# Patient Record
Sex: MALE | Race: BLACK OR AFRICAN AMERICAN | NOT HISPANIC OR LATINO | Employment: FULL TIME | URBAN - METROPOLITAN AREA
[De-identification: names, ages, dates, MRNs, and addresses within clinical notes are randomized per-mention and may not be internally consistent; named-entity substitution may affect disease eponyms.]

---

## 2017-05-03 ENCOUNTER — TELEPHONE (OUTPATIENT)
Dept: INTERNAL MEDICINE | Facility: CLINIC | Age: 62
End: 2017-05-03

## 2017-05-03 NOTE — TELEPHONE ENCOUNTER
----- Message from Daksha Perry sent at 4/28/2017  4:31 PM CDT -----  Contact: pt  Pt called and states he needs a physical done for work in the month of may and he can not wait until 6/19/17 that was the doctor next avail. Pt would like for the nurse to call him in regards to scheduling his physical for the month on may. Pt can be reached at [phone].

## 2017-05-19 ENCOUNTER — OFFICE VISIT (OUTPATIENT)
Dept: INTERNAL MEDICINE | Facility: CLINIC | Age: 62
End: 2017-05-19
Payer: COMMERCIAL

## 2017-05-19 VITALS
SYSTOLIC BLOOD PRESSURE: 136 MMHG | DIASTOLIC BLOOD PRESSURE: 82 MMHG | HEIGHT: 73 IN | WEIGHT: 207.31 LBS | HEART RATE: 66 BPM | BODY MASS INDEX: 27.47 KG/M2

## 2017-05-19 DIAGNOSIS — N52.9 ERECTILE DYSFUNCTION, UNSPECIFIED ERECTILE DYSFUNCTION TYPE: ICD-10-CM

## 2017-05-19 DIAGNOSIS — Z00.00 HEALTH CARE MAINTENANCE: Primary | ICD-10-CM

## 2017-05-19 DIAGNOSIS — D56.9 THALASSEMIA, UNSPECIFIED: ICD-10-CM

## 2017-05-19 PROCEDURE — 99999 PR PBB SHADOW E&M-EST. PATIENT-LVL III: CPT | Mod: PBBFAC,,, | Performed by: INTERNAL MEDICINE

## 2017-05-19 PROCEDURE — 99396 PREV VISIT EST AGE 40-64: CPT | Mod: S$GLB,,, | Performed by: INTERNAL MEDICINE

## 2017-05-19 RX ORDER — TADALAFIL 20 MG/1
20 TABLET ORAL EVERY OTHER DAY
Qty: 6 TABLET | Refills: 11 | Status: SHIPPED | OUTPATIENT
Start: 2017-05-19 | End: 2018-05-28 | Stop reason: SDUPTHER

## 2017-05-19 RX ORDER — AMOXICILLIN 500 MG/1
CAPSULE ORAL
Refills: 0 | COMMUNITY
Start: 2017-05-03 | End: 2017-05-19 | Stop reason: ALTCHOICE

## 2017-05-19 NOTE — PROGRESS NOTES
"Subjective:       Patient ID: Francis Weinberg is a 61 y.o. male.    Chief Complaint: Annual Exam    HPI   60 yo M here for yearly preventative healthcare visit.     Borderline BP    ED - cialis prn    Anemia 2/2 thalassemia      Review of Systems   Constitutional: Negative for fever.   Respiratory: Negative for shortness of breath.    Cardiovascular: Negative for chest pain.   Musculoskeletal: Negative.    Skin: Negative.        Objective:   /82 (BP Location: Left arm, Patient Position: Sitting, BP Method: Manual)  Pulse 66  Ht 6' 1" (1.854 m)  Wt 94 kg (207 lb 4.8 oz)  BMI 27.35 kg/m2     Physical Exam   Constitutional: He is oriented to person, place, and time. He appears well-developed and well-nourished.   HENT:   Head: Normocephalic and atraumatic.   Eyes: Conjunctivae and EOM are normal. Pupils are equal, round, and reactive to light.   Neck: Neck supple. No thyromegaly present.   Cardiovascular: Normal rate, regular rhythm and normal heart sounds.    No murmur heard.  Pulmonary/Chest: Effort normal and breath sounds normal. No respiratory distress. He has no wheezes.   Abdominal: Soft. Bowel sounds are normal. He exhibits no distension. There is no tenderness.   Musculoskeletal: Normal range of motion.   Neurological: He is alert and oriented to person, place, and time.   Skin: Skin is warm and dry. No rash noted.   Psychiatric: He has a normal mood and affect. Judgment and thought content normal.   Vitals reviewed.      Assessment:       1. Health care maintenance    2. Thalassemia, unspecified    3. Erectile dysfunction, unspecified erectile dysfunction type        Plan:       Francis was seen today for annual exam.    Diagnoses and all orders for this visit:    Health care maintenance  -     CBC auto differential; Future  -     Comprehensive metabolic panel; Future  -     Lipid panel; Future  -     TSH; Future    Thalassemia, unspecified  -     CBC auto differential; Future    Erectile dysfunction, " unspecified erectile dysfunction type  -     tadalafil (CIALIS) 20 MG Tab; Take 1 tablet (20 mg total) by mouth every other day. Take every other day as needed        cscope done 2012 in [place], repeat is due in 2022.

## 2017-05-19 NOTE — MR AVS SNAPSHOT
[facility]  [address]  [place] [address]  Phone: [phone]  Fax: [phone]                  Francis Weinberg   2017 1:40 PM   Office Visit    Description:  Male : 1955   Provider:  Latoya Olson MD   Department:  [facility]           Reason for Visit     Annual Exam           Diagnoses this Visit        Comments    Health care maintenance    -  Primary     Thalassemia, unspecified         Erectile dysfunction, unspecified erectile dysfunction type                To Do List           Future Appointments        Provider Department Dept Phone    2017 2:35 PM LAB, APPOINTMENT [facility] [phone]      Goals (5 Years of Data)     None      Follow-Up and Disposition     Return in about 1 year (around 2018) for 40 minute established visit.       These Medications        Disp Refills Start End    tadalafil (CIALIS) 20 MG Tab 6 tablet 11 2017    Take 1 tablet (20 mg total) by mouth every other day. Take every other day as needed - Oral    Pharmacy: [facility] - [place], [place] - [address] Ph #: [phone]         [facility]     [facility] Nurse Care Line -  Assistance  Unless otherwise directed by your provider, please contact [facility], our nurse care line that is available for  assistance.     Registered nurses in the [facility] provide: appointment scheduling, clinical advisement, health education, and other advisory services.  Call: [phone] (toll free)               Medications           Message regarding Medications     Verify the changes and/or additions to your medication regime listed below are the same as discussed with your clinician today.  If any of these changes or additions are incorrect, please notify your healthcare provider.        STOP taking these medications     amoxicillin (AMOXIL) 500 MG  "capsule TK ONE C PO TID TAT           Verify that the below list of medications is an accurate representation of the medications you are currently taking.  If none reported, the list may be blank. If incorrect, please contact your healthcare provider. Carry this list with you in case of emergency.           Current Medications     tadalafil (CIALIS) 20 MG Tab Take 1 tablet (20 mg total) by mouth every other day. Take every other day as needed           Clinical Reference Information           Your Vitals Were     BP Pulse Height Weight BMI    136/82 (BP Location: Left arm, Patient Position: Sitting, BP Method: Manual) 66 6' 1" (1.854 m) 94 kg (207 lb 4.8 oz) 27.35 kg/m2      Blood Pressure          Most Recent Value    BP  136/82      Allergies as of 5/19/2017     No Known Allergies      Immunizations Administered on Date of Encounter - 5/19/2017     None      Orders Placed During Today's Visit     Future Labs/Procedures Expected by Expires    CBC auto differential  5/19/2017 7/18/2018    Comprehensive metabolic panel  5/19/2017 7/18/2018    Lipid panel  5/19/2017 7/18/2018    TSH  5/19/2017 7/18/2018      [facility] Sign-Up     Activating your [facility] account is as easy as 1-2-3!     1) Visit [email or web address], select Sign Up Now, enter this activation code and your date of birth, then select Next.  [email or web address]  Expires: 7/3/2017  2:26 PM      2) Create a username and password to use when you visit [facility] in the future and select a security question in case you lose your password and select Next.    3) Enter your e-mail address and click Sign Up!    Additional Information  If you have questions, please e-mail [email or web address] or call [phone] to talk to our [facility] staff. Remember, [facility] is NOT to be used for urgent needs. For medical emergencies, dial 911.         Language Assistance Services     ATTENTION: Language assistance services are available, free of charge. Please call " [phone].      ATENCIÓN: Si habla español, tiene a hoffman disposición servicios gratuitos de asistencia lingüística. Llame al [phone].     CHÚ Ý: N?u b?n nói Ti?ng Vi?t, có các d?ch v? h? tr? ngôn ng? mi?n phí dành cho b?n. G?i s? [phone].         [facility] complies with applicable Federal civil rights laws and does not discriminate on the basis of race, color, national origin, age, disability, or sex.

## 2017-05-25 ENCOUNTER — TELEPHONE (OUTPATIENT)
Dept: INTERNAL MEDICINE | Facility: CLINIC | Age: 62
End: 2017-05-25

## 2017-05-25 NOTE — TELEPHONE ENCOUNTER
.Please call pt. Your liver and kidney function, thyroid function, and cholesterol were normal. Your anemia is stable from previous labs. Hemoglobin is stable at 13.

## 2017-10-17 ENCOUNTER — OFFICE VISIT (OUTPATIENT)
Dept: OPHTHALMOLOGY | Facility: CLINIC | Age: 62
End: 2017-10-17
Payer: COMMERCIAL

## 2017-10-17 DIAGNOSIS — H25.12 CATARACTA BRUNESCENS OF LEFT EYE: ICD-10-CM

## 2017-10-17 DIAGNOSIS — Z96.1 STATUS POST CATARACT EXTRACTION AND INSERTION OF INTRAOCULAR LENS, RIGHT: ICD-10-CM

## 2017-10-17 DIAGNOSIS — Z98.41 STATUS POST CATARACT EXTRACTION AND INSERTION OF INTRAOCULAR LENS, RIGHT: ICD-10-CM

## 2017-10-17 DIAGNOSIS — H25.812 COMBINED FORMS OF AGE-RELATED CATARACT OF LEFT EYE: Primary | ICD-10-CM

## 2017-10-17 PROCEDURE — 99999 PR PBB SHADOW E&M-EST. PATIENT-LVL II: CPT | Mod: PBBFAC,,, | Performed by: OPHTHALMOLOGY

## 2017-10-17 PROCEDURE — 76519 ECHO EXAM OF EYE: CPT | Mod: S$GLB,,, | Performed by: OPHTHALMOLOGY

## 2017-10-17 PROCEDURE — 92014 COMPRE OPH EXAM EST PT 1/>: CPT | Mod: S$GLB,,, | Performed by: OPHTHALMOLOGY

## 2017-10-17 NOTE — PROGRESS NOTES
HPI     Dr. Gerard referral     S/p phaco w/IOL OD 2/27/14     Pt reports of decrease OS due to cataract, lots of glare around lights at   night, makes him uncomfortable driving, would like to know if left   cataract is ready for removal. Pt was last seen by Dr.Sandra Holman on   [address] in [place]. Pt denies any flashes or floaters, not on any   gtts.  No Dm.  Has HTN, but well controlled    Last edited by Maicol Parra MD on 10/17/2017  2:55 PM. (History)        Assessment /Plan     For exam results, see Encounter Report.    Combined forms of age-related cataract of left eye    Cataracta brunescens of left eye  -     Axial Eye Length - OU - Both Eyes    Status post cataract extraction and insertion of intraocular lens, right      # Visually significant cataract - Brunescent/cortical/PSC cat OS  - Interfering with activities of daily living.  Pt desires cataract surgery for [facility] rehabilitation. - R/B/A discussed and pt agrees to proceed with surgery.   - IOL options discussed according to patient's goals and concomitant ocular pathology;  and pt content with monofocal lens.    - Target: plano. Previously: (SN60WF  21.0 OS)  - no phacodonesis    Visually significant nuclear sclerotic cataract   - Interfering with activities of daily living.  Pt desires cataract surgery for [facility] rehabilitation.   - R/B/A discussed and pt agrees to proceed with surgery.   - IOL options discussed according to patient's goals and concomitant ocular pathology; and pt content with monofocal lens.    - Target: plano.     (SN60WF  21.0 OS)  *VB    # Pseudophakia OD  - Phaco/PCIOL 2/27/14 with Dr. Womack OD  - Trace PCO, not visually significant, monitor  - SN60WF - 20.0     Will plan for cataract extraction with PCIOL OS

## 2017-10-18 ENCOUNTER — TELEPHONE (OUTPATIENT)
Dept: OPHTHALMOLOGY | Facility: CLINIC | Age: 62
End: 2017-10-18

## 2017-10-18 DIAGNOSIS — H25.12 NUCLEAR SCLEROTIC CATARACT OF LEFT EYE: Primary | ICD-10-CM

## 2017-10-18 RX ORDER — PREDNISOLONE ACETATE 10 MG/ML
1 SUSPENSION/ DROPS OPHTHALMIC 3 TIMES DAILY
Qty: 5 ML | Refills: 1 | Status: SHIPPED | OUTPATIENT
Start: 2017-10-18 | End: 2017-11-17

## 2017-10-18 RX ORDER — OFLOXACIN 3 MG/ML
1 SOLUTION/ DROPS OPHTHALMIC 3 TIMES DAILY
Qty: 5 ML | Refills: 1 | Status: SHIPPED | OUTPATIENT
Start: 2017-10-18 | End: 2017-11-17

## 2018-01-03 ENCOUNTER — TELEPHONE (OUTPATIENT)
Dept: OPHTHALMOLOGY | Facility: CLINIC | Age: 63
End: 2018-01-03

## 2018-01-04 ENCOUNTER — HOSPITAL ENCOUNTER (OUTPATIENT)
Facility: HOSPITAL | Age: 63
Discharge: HOME | End: 2018-01-04
Attending: OPHTHALMOLOGY | Admitting: OPHTHALMOLOGY
Payer: COMMERCIAL

## 2018-01-04 ENCOUNTER — ANESTHESIA EVENT (OUTPATIENT)
Dept: SURGERY | Facility: HOSPITAL | Age: 63
End: 2018-01-04
Payer: COMMERCIAL

## 2018-01-04 ENCOUNTER — SURGERY (OUTPATIENT)
Age: 63
End: 2018-01-04

## 2018-01-04 ENCOUNTER — ANESTHESIA (OUTPATIENT)
Dept: SURGERY | Facility: HOSPITAL | Age: 63
End: 2018-01-04
Payer: COMMERCIAL

## 2018-01-04 VITALS
WEIGHT: 205 LBS | SYSTOLIC BLOOD PRESSURE: 129 MMHG | HEIGHT: 73 IN | TEMPERATURE: 97 F | DIASTOLIC BLOOD PRESSURE: 86 MMHG | BODY MASS INDEX: 27.17 KG/M2 | HEART RATE: 72 BPM | OXYGEN SATURATION: 100 % | RESPIRATION RATE: 20 BRPM

## 2018-01-04 DIAGNOSIS — H25.10 SENILE NUCLEAR SCLEROSIS: ICD-10-CM

## 2018-01-04 DIAGNOSIS — H26.9 CORTICAL CATARACT OF LEFT EYE: Primary | ICD-10-CM

## 2018-01-04 PROCEDURE — 37000009 HC ANESTHESIA EA ADD 15 MINS: Performed by: OPHTHALMOLOGY

## 2018-01-04 PROCEDURE — D9220A PRA ANESTHESIA: Mod: ANES,,, | Performed by: ANESTHESIOLOGY

## 2018-01-04 PROCEDURE — 71000015 HC POSTOP RECOV 1ST HR: Performed by: OPHTHALMOLOGY

## 2018-01-04 PROCEDURE — D9220A PRA ANESTHESIA: Mod: CRNA,,, | Performed by: NURSE ANESTHETIST, CERTIFIED REGISTERED

## 2018-01-04 PROCEDURE — 63600175 PHARM REV CODE 636 W HCPCS: Performed by: NURSE ANESTHETIST, CERTIFIED REGISTERED

## 2018-01-04 PROCEDURE — 37000008 HC ANESTHESIA 1ST 15 MINUTES: Performed by: OPHTHALMOLOGY

## 2018-01-04 PROCEDURE — 36000707: Performed by: OPHTHALMOLOGY

## 2018-01-04 PROCEDURE — 25000003 PHARM REV CODE 250: Performed by: OPHTHALMOLOGY

## 2018-01-04 PROCEDURE — 66982 XCAPSL CTRC RMVL CPLX WO ECP: CPT | Mod: LT,,, | Performed by: OPHTHALMOLOGY

## 2018-01-04 PROCEDURE — 36000706: Performed by: OPHTHALMOLOGY

## 2018-01-04 PROCEDURE — C9447 INJ, PHENYLEPHRINE KETOROLAC: HCPCS | Performed by: OPHTHALMOLOGY

## 2018-01-04 PROCEDURE — 63600175 PHARM REV CODE 636 W HCPCS: Performed by: OPHTHALMOLOGY

## 2018-01-04 PROCEDURE — 71000044 HC DOSC ROUTINE RECOVERY FIRST HOUR: Performed by: OPHTHALMOLOGY

## 2018-01-04 PROCEDURE — V2632 POST CHMBR INTRAOCULAR LENS: HCPCS | Performed by: OPHTHALMOLOGY

## 2018-01-04 DEVICE — LENS 21.0 ACRYSOF: Type: IMPLANTABLE DEVICE | Site: EYE | Status: FUNCTIONAL

## 2018-01-04 RX ORDER — MOXIFLOXACIN 5 MG/ML
SOLUTION/ DROPS OPHTHALMIC
Status: DISCONTINUED | OUTPATIENT
Start: 2018-01-04 | End: 2018-01-04 | Stop reason: HOSPADM

## 2018-01-04 RX ORDER — MIDAZOLAM HYDROCHLORIDE 1 MG/ML
INJECTION, SOLUTION INTRAMUSCULAR; INTRAVENOUS
Status: DISCONTINUED | OUTPATIENT
Start: 2018-01-04 | End: 2018-01-04

## 2018-01-04 RX ORDER — SODIUM CHLORIDE 9 MG/ML
INJECTION, SOLUTION INTRAVENOUS CONTINUOUS
Status: DISCONTINUED | OUTPATIENT
Start: 2018-01-04 | End: 2018-01-04 | Stop reason: HOSPADM

## 2018-01-04 RX ORDER — PHENYLEPHRINE HYDROCHLORIDE 25 MG/ML
1 SOLUTION/ DROPS OPHTHALMIC
Status: COMPLETED | OUTPATIENT
Start: 2018-01-04 | End: 2018-01-04

## 2018-01-04 RX ORDER — KETOROLAC TROMETHAMINE 5 MG/ML
1 SOLUTION OPHTHALMIC ONCE
Status: COMPLETED | OUTPATIENT
Start: 2018-01-04 | End: 2018-01-04

## 2018-01-04 RX ORDER — TROPICAMIDE 10 MG/ML
1 SOLUTION/ DROPS OPHTHALMIC
Status: COMPLETED | OUTPATIENT
Start: 2018-01-04 | End: 2018-01-04

## 2018-01-04 RX ORDER — LIDOCAINE HYDROCHLORIDE 40 MG/ML
INJECTION, SOLUTION RETROBULBAR
Status: DISCONTINUED | OUTPATIENT
Start: 2018-01-04 | End: 2018-01-04 | Stop reason: HOSPADM

## 2018-01-04 RX ORDER — PREDNISOLONE ACETATE 10 MG/ML
SUSPENSION/ DROPS OPHTHALMIC
Status: DISCONTINUED | OUTPATIENT
Start: 2018-01-04 | End: 2018-01-04 | Stop reason: HOSPADM

## 2018-01-04 RX ORDER — LIDOCAINE HYDROCHLORIDE 10 MG/ML
INJECTION, SOLUTION EPIDURAL; INFILTRATION; INTRACAUDAL; PERINEURAL
Status: DISCONTINUED
Start: 2018-01-04 | End: 2018-01-04 | Stop reason: HOSPADM

## 2018-01-04 RX ORDER — LIDOCAINE HYDROCHLORIDE 10 MG/ML
INJECTION, SOLUTION EPIDURAL; INFILTRATION; INTRACAUDAL; PERINEURAL
Status: DISCONTINUED | OUTPATIENT
Start: 2018-01-04 | End: 2018-01-04 | Stop reason: HOSPADM

## 2018-01-04 RX ORDER — PROPARACAINE HYDROCHLORIDE 5 MG/ML
1 SOLUTION/ DROPS OPHTHALMIC ONCE
Status: COMPLETED | OUTPATIENT
Start: 2018-01-04 | End: 2018-01-04

## 2018-01-04 RX ORDER — PREDNISOLONE ACETATE 10 MG/ML
SUSPENSION/ DROPS OPHTHALMIC
Status: DISCONTINUED
Start: 2018-01-04 | End: 2018-01-04 | Stop reason: HOSPADM

## 2018-01-04 RX ORDER — MOXIFLOXACIN 5 MG/ML
1 SOLUTION/ DROPS OPHTHALMIC
Status: COMPLETED | OUTPATIENT
Start: 2018-01-04 | End: 2018-01-04

## 2018-01-04 RX ORDER — LIDOCAINE HYDROCHLORIDE 40 MG/ML
INJECTION, SOLUTION RETROBULBAR
Status: DISCONTINUED
Start: 2018-01-04 | End: 2018-01-04 | Stop reason: HOSPADM

## 2018-01-04 RX ORDER — ACETAMINOPHEN 325 MG/1
650 TABLET ORAL EVERY 4 HOURS PRN
Status: DISCONTINUED | OUTPATIENT
Start: 2018-01-04 | End: 2018-01-04 | Stop reason: HOSPADM

## 2018-01-04 RX ADMIN — LIDOCAINE HYDROCHLORIDE 1 ML: 10 INJECTION, SOLUTION EPIDURAL; INFILTRATION; INTRACAUDAL; PERINEURAL at 09:01

## 2018-01-04 RX ADMIN — SODIUM CHLORIDE 1000 ML: 0.9 INJECTION, SOLUTION INTRAVENOUS at 09:01

## 2018-01-04 RX ADMIN — MOXIFLOXACIN HYDROCHLORIDE 1 DROP: 5 SOLUTION/ DROPS OPHTHALMIC at 09:01

## 2018-01-04 RX ADMIN — LIDOCAINE HYDROCHLORIDE 2 ML: 40 INJECTION, SOLUTION RETROBULBAR; TOPICAL at 09:01

## 2018-01-04 RX ADMIN — PHENYLEPHRINE HYDROCHLORIDE 1 DROP: 25 SOLUTION/ DROPS OPHTHALMIC at 08:01

## 2018-01-04 RX ADMIN — MOXIFLOXACIN HYDROCHLORIDE 1 DROP: 5 SOLUTION/ DROPS OPHTHALMIC at 08:01

## 2018-01-04 RX ADMIN — TROPICAMIDE 1 DROP: 10 SOLUTION/ DROPS OPHTHALMIC at 09:01

## 2018-01-04 RX ADMIN — TROPICAMIDE 1 DROP: 10 SOLUTION/ DROPS OPHTHALMIC at 08:01

## 2018-01-04 RX ADMIN — PHENYLEPHRINE AND KETOROLAC 4 ML: 10.16; 2.88 INJECTION, SOLUTION, CONCENTRATE INTRAOCULAR at 09:01

## 2018-01-04 RX ADMIN — PREDNISOLONE ACETATE 1 DROP: 10 SUSPENSION/ DROPS OPHTHALMIC at 09:01

## 2018-01-04 RX ADMIN — PROPARACAINE HYDROCHLORIDE 1 DROP: 5 SOLUTION/ DROPS OPHTHALMIC at 08:01

## 2018-01-04 RX ADMIN — MIDAZOLAM HYDROCHLORIDE 2 MG: 1 INJECTION, SOLUTION INTRAMUSCULAR; INTRAVENOUS at 09:01

## 2018-01-04 RX ADMIN — KETOROLAC TROMETHAMINE 1 DROP: 5 SOLUTION/ DROPS OPHTHALMIC at 08:01

## 2018-01-04 RX ADMIN — SODIUM CHONDROITIN SULFATE / SODIUM HYALURONATE 1.05 ML: 0.55-0.5 INJECTION INTRAOCULAR at 09:01

## 2018-01-04 RX ADMIN — PHENYLEPHRINE HYDROCHLORIDE 1 DROP: 25 SOLUTION/ DROPS OPHTHALMIC at 09:01

## 2018-01-04 NOTE — ANESTHESIA PREPROCEDURE EVALUATION
01/04/2018  Francis Weinberg is a 62 y.o., male.  Pre-operative evaluation for Procedure(s) (LRB):  PHACOEMULSIFICATION-ASPIRATION-CATARACT (Left)  INSERTION-INTRAOCULAR LENS (IOL) (Left)    Review of patient's allergies indicates:  No Known Allergies    No current facility-administered medications on file prior to encounter.      Current Outpatient Prescriptions on File Prior to Encounter   Medication Sig Dispense Refill    tadalafil (CIALIS) 20 MG Tab Take 1 tablet (20 mg total) by mouth every other day. Take every other day as needed 6 tablet 11       Patient Active Problem List   Diagnosis    Cortical cataract of left eye    Nuclear cataract    S/P right cataract extraction    Thalassemia, unspecified    Combined forms of age-related cataract of left eye    Pseudophakia, left eye    Senile nuclear sclerosis       Past Surgical History:   Procedure Laterality Date    CATARACT EXTRACTION W/  INTRAOCULAR LENS IMPLANT  2/27/14    Right Eye           No results for input(s): HCT in the last 72 hours.  No results for input(s): PLT in the last 72 hours.  No results for input(s): K in the last 72 hours.  No results for input(s): CREATININE in the last 72 hours.  No results for input(s): GLU in the last 72 hours.  No results for input(s): PT in the last 72 hours.                    Anesthesia Evaluation         Review of Systems  Anesthesia Hx:  No problems with previous Anesthesia   Social:  Non-Smoker, Alcohol Use    Hematology/Oncology:  Hematology Normal   Oncology Normal    -- Denies Anemia:    Cardiovascular:   Denies Hypertension.  Denies MI.    Denies Angina.     Frequent one our on treadmill, 3 miles.   Pulmonary:  Pulmonary Normal  Denies COPD.  Denies Asthma.  Denies Shortness of breath.    Renal/:   Denies Chronic Renal Disease.     Hepatic/GI:   Denies Liver Disease.    Neurological:   Denies  TIA. Denies CVA. Denies Seizures.    Endocrine:   Denies Diabetes.        Physical Exam  General:  Well nourished    Airway/Jaw/Neck:  Airway Findings: Mouth Opening: Normal Tongue: Normal  General Airway Assessment: Adult, Average  Mallampati: II  TM Distance: Normal, at least 6 cm  Jaw/Neck Findings:  Neck ROM: Normal ROM       Chest/Lungs:  Chest/Lungs Findings: Clear to auscultation     Heart/Vascular:  Heart Findings: Rate: Normal  Rhythm: Regular Rhythm  Sounds: Normal        Mental Status:  Mental Status Findings:  Cooperative, Alert and Oriented         Anesthesia Plan  Type of Anesthesia, risks & benefits discussed:  Anesthesia Type:  general, MAC  Patient's Preference:   Intra-op Monitoring Plan:   Intra-op Monitoring Plan Comments:   Post Op Pain Control Plan:   Post Op Pain Control Plan Comments: As per surgeon's plan  Induction:   IV  Beta Blocker:  Patient is not currently on a Beta-Blocker (No further documentation required).       Informed Consent: Patient understands risks and agrees with Anesthesia plan.  Questions answered. Anesthesia consent signed with patient.  ASA Score: 2     Day of Surgery Review of History & Physical:    H&P update referred to the surgeon.         Ready For Surgery From Anesthesia Perspective.     Vitals - 1 value per visit 5/19/2017 10/17/2017 1/4/2018   SYSTOLIC 136  146   DIASTOLIC 82  102   PULSE 66  71

## 2018-01-04 NOTE — OP NOTE
DATE OF PROCEDURE: 01/04/2018    SURGEON: LYDIA TRAN MD    PREOPERATIVE DIAGNOSIS:  Mature brunescent senile nuclear sclerotic cataract left eye.     POSTOPERATIVE DIAGNOSIS: Mature brunescent senile nuclear sclerotic cataract left eye.     PROCEDURE PERFORMED:  Complex phacoemulsification with placement of intraocular lens, left eye, with trypan blue    IMPLANT:  SN60WF 21.0    ANESTHESIA:  Topical and MAC    COMPLICATIONS: none    ESTIMATED BLOOD LOSS: <1cc    SPECIMENS: none    INDICATIONS FOR PROCEDURE:  This patient presented to the clinic with decreased vision in the left eye and was found to have a cataract.  The risks, benefits, and alternatives were discussed and the patient agreed to proceed with phacoemulsification and implantation of a lens in the left eye.     PROCEDURE IN DETAIL:  The patient was met in the preop holding area.  Consent was confirmed to be signed.  The operative site was marked.  The patient was brought into the operating room by the anesthesia team and placed under monitored anesthesia care.  The left eye was prepped and draped in a sterile ophthalmic fashion.  A Pedro Luis speculum was placed into the left eye.   A paracentesis site was made and 1% preservative-free lidocaine was injected into the anterior chamber.  Trypan blue was then injected and allowed to sit for 1 minute.  Then BSS was used to wash out the trypan blue. Viscoelastic material was injected into the anterior chamber.  A keratome blade was used to make a clear corneal incision.  A cystotome was used to initiate the continuous curvilinear capsulorrhexis which was completed with Utrata forceps.  BSS on a meyers cannula was used to perform hydrodissection.  The phacoemulsification tip was introduced into the eye and the nucleus was removed in a standard divide-and-conquer fashion.  Remaining cortical material was removed from the eye using irrigation-aspiration.  The capsular bag was filled with viscoelastic material  and the intraocular lens was injected and positioned into place. Remaining viscoelastic material was removed from the eye using irrigation and aspiration.  The corneal wounds were hydrated.  The eye was filled to physiologic pressure. The wounds were found to be watertight. Drops of Vigamox and prednisilone were placed into the eye.  The eye was washed, dried, and shielded.  The patient tolerated the procedure well and knows to follow up with me tomorrow morning, sooner if needed.

## 2018-01-04 NOTE — ANESTHESIA POSTPROCEDURE EVALUATION
"Anesthesia Post Evaluation    Patient: Francis Weinberg    Procedure(s) Performed: Procedure(s) (LRB):  PHACOEMULSIFICATION-ASPIRATION-CATARACT (Left)  INSERTION-INTRAOCULAR LENS (IOL) (Left)    Final Anesthesia Type: MAC  Patient location during evaluation: PACU  Patient participation: Yes- Able to Participate  Level of consciousness: awake and alert and oriented  Post-procedure vital signs: reviewed and stable  Pain management: adequate  Airway patency: patent  PONV status at discharge: No PONV  Anesthetic complications: no      Cardiovascular status: stable  Respiratory status: unassisted, spontaneous ventilation and room air  Hydration status: euvolemic  Follow-up not needed.        Visit Vitals  /86 (BP Location: Left arm, Patient Position: Lying)   Pulse 72   Temp 36.5 °C (97.7 °F) (Oral)   Resp 20   Ht 6' 1" (1.854 m)   Wt 93 kg (205 lb)   SpO2 100%   BMI 27.05 kg/m²       Pain/Alisha Score: Pain Assessment Performed: Yes (1/4/2018  9:38 AM)  Presence of Pain: denies (1/4/2018  9:38 AM)  Alisha Score: 10 (1/4/2018  9:38 AM)      "

## 2018-01-04 NOTE — DISCHARGE INSTRUCTIONS
Small-Incision Cataract Surgery: Removing the Old Lens    You may be surprised by how little time small-incision cataract surgery takes.  The procedure  Your doctor uses a microscope and tiny tools to make the incision and remove the old lens. A special tool breaks apart the old lens with sound waves (ultrasound) and then takes out the pieces. This process is called phacoemulsification. The natural membrane (capsule) that held your lens is left in place.  Incision size  A smaller incision means a shorter recovery time for you. The location of the incision will vary.   Date Last Reviewed: 6/14/2015  © 4374-4186 [employer]. [address] [place] [address], [place] [address]. All rights reserved. This information is not intended as a substitute for professional medical care. Always follow your healthcare professional's instructions.

## 2018-01-04 NOTE — TRANSFER OF CARE
"Anesthesia Transfer of Care Note    Patient: Francis Weinberg    Procedure(s) Performed: Procedure(s) (LRB):  PHACOEMULSIFICATION-ASPIRATION-CATARACT (Left)  INSERTION-INTRAOCULAR LENS (IOL) (Left)    Patient location: PACU    Anesthesia Type: MAC    Transport from OR: Transported from OR on room air with adequate spontaneous ventilation    Post pain: adequate analgesia    Post assessment: no apparent anesthetic complications    Post vital signs: stable    Level of consciousness: awake, alert and oriented    Nausea/Vomiting: no nausea/vomiting    Complications: none    Transfer of care protocol was followed      Last vitals:   Visit Vitals  BP (!) 146/102 (BP Location: Left arm, Patient Position: Lying)   Pulse 71   Temp 36.5 °C (97.7 °F) (Oral)   Resp 16   Ht 6' 1" (1.854 m)   Wt 93 kg (205 lb)   SpO2 99%   BMI 27.05 kg/m²     "

## 2018-01-04 NOTE — DISCHARGE SUMMARY
BRIEF DISCHARGE NOTE:    Reason for hospitalization -  Cataract surgery     Final Diagnosis - Visually significant Cataract    Procedures and treatment provided - Status post phacoemulsification with placement of intraocular lens     Diet - Advance to regular as tolerated    Activity - as tolerated    Disposition at the end of the case - Good.    Discharge: to home    The patient tolerated the procedure well and knows to follow up with me tomorrow morning in the eye clinic, sooner if needed.    Patient and family instructions (as appropriate) - Given to patient on discharge    Kathy Womack MD

## 2018-01-04 NOTE — ANESTHESIA RELEASE NOTE
"Anesthesia Release from PACU Note    Patient: Francis Weinberg    Procedure(s) Performed: Procedure(s) (LRB):  PHACOEMULSIFICATION-ASPIRATION-CATARACT (Left)  INSERTION-INTRAOCULAR LENS (IOL) (Left)    Anesthesia type: mac    Post pain: Adequate analgesia reported    Post assessment: no apparent anesthetic complications, tolerated procedure well and no evidence of recall    Post vital signs: /86 (BP Location: Left arm, Patient Position: Lying)   Pulse 72   Temp 36.5 °C (97.7 °F) (Oral)   Resp 20   Ht 6' 1" (1.854 m)   Wt 93 kg (205 lb)   SpO2 100%   BMI 27.05 kg/m²     Level of consciousness: awake, alert and oriented    Nausea/Vomiting: no nausea/no vomiting    Complications: none    Airway Patency: patent    Respiratory: unassisted, spontaneous ventilation, room air    Cardiovascular: stable and blood pressure at baseline    Hydration: euvolemic    "

## 2018-01-04 NOTE — PROGRESS NOTES
Dr. Portillo notified in person, pt's /101 on right arm, recheck on left arm 146/102. Pt asymptomatic. No further orders/instructions given at this time.

## 2018-01-04 NOTE — H&P
History    Chief complaint:  Painless progressive vision loss    Present Ilness/Diagnosis: Nuclear sclerotic Cataract    Past Medical History:  has a past medical history of Anemia and Cataract.    Family History/Social History: refer to chart    Allergies: Review of patient's allergies indicates:  No Known Allergies    Current Medications: No current facility-administered medications for this encounter.     Current Outpatient Prescriptions:     tadalafil (CIALIS) 20 MG Tab, Take 1 tablet (20 mg total) by mouth every other day. Take every other day as needed, Disp: 6 tablet, Rfl: 11    Physical Exam    BP: Vital signs stable  General: No apparent distress  HEENT: nuclear sclerotic cataract  Lungs: adequate respirations  Heart: + pulses  Abdomen: soft  Rectal/pelvic: deferred    Impression: Visually significant Cataract    Plan: Phacoemulsification with implantation of Intraocular lens]

## 2018-01-05 ENCOUNTER — OFFICE VISIT (OUTPATIENT)
Dept: OPHTHALMOLOGY | Facility: CLINIC | Age: 63
End: 2018-01-05
Payer: COMMERCIAL

## 2018-01-05 DIAGNOSIS — Z96.1 STATUS POST CATARACT EXTRACTION AND INSERTION OF INTRAOCULAR LENS, LEFT: Primary | ICD-10-CM

## 2018-01-05 DIAGNOSIS — Z98.42 STATUS POST CATARACT EXTRACTION AND INSERTION OF INTRAOCULAR LENS, LEFT: Primary | ICD-10-CM

## 2018-01-05 PROCEDURE — 99999 PR PBB SHADOW E&M-EST. PATIENT-LVL II: CPT | Mod: PBBFAC,,, | Performed by: OPHTHALMOLOGY

## 2018-01-05 PROCEDURE — 99024 POSTOP FOLLOW-UP VISIT: CPT | Mod: S$GLB,,, | Performed by: OPHTHALMOLOGY

## 2018-01-05 RX ORDER — OFLOXACIN 3 MG/ML
1 SOLUTION/ DROPS OPHTHALMIC 3 TIMES DAILY
COMMUNITY
End: 2018-05-28

## 2018-01-05 RX ORDER — PREDNISOLONE ACETATE 10 MG/ML
1 SUSPENSION/ DROPS OPHTHALMIC 3 TIMES DAILY
COMMUNITY
End: 2018-05-28

## 2018-01-05 RX ORDER — KETOROLAC TROMETHAMINE 4 MG/ML
1 SOLUTION/ DROPS OPHTHALMIC 3 TIMES DAILY
COMMUNITY
End: 2018-05-28

## 2018-01-05 NOTE — PROGRESS NOTES
HPI     Dr. Gerard referral     s/p phaco w/IOL OS 1/4/18  S/p phaco w/IOL OD 2/27/14     PF TID OS  Ocuflox TID OS  Ketorolac TID OS    Pt here for 1 day post op OS.  Pt states doing well. Pt denies pain OS.     Last edited by Kathy Womack MD on 1/5/2018  2:05 PM. (History)            Assessment /Plan     For exam results, see Encounter Report.    Status post cataract extraction and insertion of intraocular lens, left      Slit Lamp Exam  L/L - normal  C/s - quiet  Cornea - clear  A/C - 1+ cell  Lens - PCIOL    POD #1 s/p phaco/IOL  - doing well  - continue the following drops:    vigamox or ocuflox TID x 1 wk then stop  Pred forte or durezol or dexamethasone TID x  4 wks  Ketorolac TID until runs out    Appropriate precautions and post op medications reviewed.  Patient instructed to call or come in if symptoms of redness, decreased vision, or pain are experienced.    -f/up 1-2wks, sooner PRN.

## 2018-01-12 ENCOUNTER — OFFICE VISIT (OUTPATIENT)
Dept: OPHTHALMOLOGY | Facility: CLINIC | Age: 63
End: 2018-01-12
Payer: COMMERCIAL

## 2018-01-12 DIAGNOSIS — Z98.42 STATUS POST CATARACT EXTRACTION AND INSERTION OF INTRAOCULAR LENS, LEFT: Primary | ICD-10-CM

## 2018-01-12 DIAGNOSIS — Z96.1 STATUS POST CATARACT EXTRACTION AND INSERTION OF INTRAOCULAR LENS, LEFT: Primary | ICD-10-CM

## 2018-01-12 PROCEDURE — 99999 PR PBB SHADOW E&M-EST. PATIENT-LVL II: CPT | Mod: PBBFAC,,, | Performed by: OPHTHALMOLOGY

## 2018-01-12 PROCEDURE — 99024 POSTOP FOLLOW-UP VISIT: CPT | Mod: S$GLB,,, | Performed by: OPHTHALMOLOGY

## 2018-01-12 NOTE — PROGRESS NOTES
HPI     Dr. Gerard referral     s/p phaco w/IOL OS 1/4/18  S/p phaco w/IOL OD 2/27/14     PF TID OS  Ocuflox TID OS  Ketorolac TID OS    Pt here for 1 week post op OS.  Pt states doing well.  Pt denies pain OS.       Last edited by Jody Vo MA on 1/12/2018 10:12 AM.   (History)            Assessment /Plan     For exam results, see Encounter Report.    Status post cataract extraction and insertion of intraocular lens, left      PO week #1 s/p phaco/IOL -    - doing well, no issues  - okay to d/c abx gtt  - continue PF/ketoroloc TID for total of 1 month    Content with va sc - OTC readers.    F/up dr. Gerard 6 mo

## 2018-02-09 ENCOUNTER — HOSPITAL ENCOUNTER (EMERGENCY)
Facility: HOSPITAL | Age: 63
Discharge: HOME | End: 2018-02-09
Attending: EMERGENCY MEDICINE
Payer: COMMERCIAL

## 2018-02-09 VITALS
TEMPERATURE: 98 F | OXYGEN SATURATION: 97 % | SYSTOLIC BLOOD PRESSURE: 148 MMHG | HEART RATE: 74 BPM | HEIGHT: 73 IN | WEIGHT: 205 LBS | BODY MASS INDEX: 27.17 KG/M2 | RESPIRATION RATE: 20 BRPM | DIASTOLIC BLOOD PRESSURE: 90 MMHG

## 2018-02-09 DIAGNOSIS — S02.2XXA CLOSED FRACTURE OF NASAL BONE, INITIAL ENCOUNTER: Primary | ICD-10-CM

## 2018-02-09 PROCEDURE — 99284 EMERGENCY DEPT VISIT MOD MDM: CPT

## 2018-02-09 RX ORDER — IBUPROFEN 600 MG/1
600 TABLET ORAL EVERY 8 HOURS PRN
Qty: 20 TABLET | Refills: 0 | Status: SHIPPED | OUTPATIENT
Start: 2018-02-09 | End: 2018-05-28

## 2018-02-09 NOTE — ED PROVIDER NOTES
Encounter Date: 2/9/2018    SCRIBE #1 NOTE: Whit ANTONIO, jak scribing for, and in the presence of, KACY Bob.       History     Chief Complaint   Patient presents with    Fall     slipped in stocking feet on wood floor - event occured approx 2 hrs ago    Facial Injury     significant swelling to Rt cheek - face struck floor       02/09/2018 5:50 PM     Chief complaint: Facial pain and swelling      Francis Weinberg is a 62 y.o. male with no pertinent medical history who presents to the ED with an onset of right-sided facial pain and swelling that occurred after he fell down 2-3 stairs and hit his face on a step about 2.5 hrs ago. The patient reports that he first noticed the swelling when he woke up from a nap after the fall. The patient endorses nose bleeding, but denies loose teeth and all other symptoms at this time. The patient takes an 81 mg aspirin every day, but takes no there medications. There is no pertinent SHx noted.      The history is provided by the patient.     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     Cataract      Past Surgical History:   Procedure Laterality Date    CATARACT EXTRACTION W/  INTRAOCULAR LENS IMPLANT  2/27/14    Right Eye     Family History   Problem Relation Age of Onset    Cataracts Mother     Hypertension Mother     Cerebral aneurysm Father     No Known Problems Brother     No Known Problems Sister     No Known Problems Maternal Aunt     No Known Problems Maternal Uncle     No Known Problems Paternal Aunt     No Known Problems Paternal Uncle     No Known Problems Maternal Grandmother     No Known Problems Maternal Grandfather     No Known Problems Paternal Grandmother     No Known Problems Paternal Grandfather     Amblyopia Neg Hx     Blindness Neg Hx     Cancer Neg Hx     Diabetes Neg Hx     Glaucoma Neg Hx     Macular degeneration  Neg Hx     Retinal detachment Neg Hx     Strabismus Neg Hx     Stroke Neg Hx     Thyroid disease Neg Hx      Social History   Substance Use Topics    Smoking status: Never Smoker    Smokeless tobacco: Never Used    Alcohol use Yes      Comment: socially, 3-4 drinks on days off     Review of Systems   Constitutional: Negative for fever.   HENT: Positive for nosebleeds. Negative for sore throat.         Positive for facial pain and swelling (right side). Negative for loose teeth.   Respiratory: Negative for shortness of breath.    Cardiovascular: Negative for chest pain.   Gastrointestinal: Negative for nausea.   Genitourinary: Negative for dysuria.   Musculoskeletal: Negative for back pain.   Skin: Negative for rash.   Neurological: Negative for weakness.   Hematological: Does not bruise/bleed easily.       Physical Exam     Initial Vitals [02/09/18 1736]   BP Pulse Resp Temp SpO2   (!) 148/90 74 20 98.2 °F (36.8 °C) 97 %      MAP       109.33         Physical Exam    Nursing note and vitals reviewed.  Constitutional: Vital signs are normal. He appears well-developed and well-nourished.   HENT:   Head: Normocephalic and atraumatic.   Right Ear: Tympanic membrane and external ear normal.   Left Ear: Tympanic membrane and external ear normal.   Nose: No mucosal edema, rhinorrhea, nose lacerations, sinus tenderness, nasal deformity, septal deviation or nasal septal hematoma. Epistaxis is observed.  No foreign bodies. Right sinus exhibits maxillary sinus tenderness. Right sinus exhibits no frontal sinus tenderness. Left sinus exhibits no maxillary sinus tenderness and no frontal sinus tenderness.   Mouth/Throat: Uvula is midline, oropharynx is clear and moist and mucous membranes are normal. No trismus in the jaw. Normal dentition. No oropharyngeal exudate, posterior oropharyngeal edema, posterior oropharyngeal erythema or tonsillar abscesses.   There is swelling over the right maxillary sinus with tenderness.  There is a small amount of bright red drainage coming from the anterior plexus of the right naris. There is normal dentition and no loose teeth. Small amount of gingival bleeding proximal and to the right if the superior labial frenulum where gums meet superior lip; no apparent laceration or abrasion, hard palate intact without movement. No trismus.    Eyes: Conjunctivae are normal. Pupils are equal, round, and reactive to light.   Neck: Normal range of motion. Neck supple.   Cardiovascular: Normal rate, regular rhythm, normal heart sounds and intact distal pulses.   No murmur heard.  Pulmonary/Chest: Breath sounds normal. No respiratory distress.   Abdominal: Soft. Normal appearance and bowel sounds are normal. He exhibits no distension. There is no tenderness.   Musculoskeletal: Normal range of motion.        Right shoulder: Normal.        Right elbow: Normal.       Right wrist: Normal.   Neurological: He is alert and oriented to person, place, and time. He has normal strength. No cranial nerve deficit or sensory deficit.   Skin: Skin is warm, dry and intact.   Psychiatric: He has a normal mood and affect. His speech is normal and behavior is normal.         ED Course   Procedures  Labs Reviewed - No data to display     Imaging Results    None            Medical Decision Making:   History:   Old Medical Records: I decided to obtain old medical records.  Differential Diagnosis:   My differential diagnosis includes facial fracture, contusion, intracranial hemorrhage, subdural hematoma, and septal hematoma.  Clinical Tests:   Radiological Study: Ordered and Reviewed       APC / Resident Notes:   Patient is a 62 y.o. male who presents to the ED 02/09/2018 who underwent emergent evaluation for fall. CN II - XII intact with bi upper and lower extremity 5/5 strength with normal sensation. No LOC. Right epistaxis present on arrival with source visualized at right anterior plexus of right nare that resolved in ED without  manual pressure x 20 minutes; no septal hematoma; swelling over right maxillary sinus with TTP; There is a small amount of bright red drainage coming from the anterior plexus of the right naris. There is normal dentition and no loose teeth. Small amount of gingival bleeding proximal and to the right if the superior labial frenulum where gums meet superior lip; no apparent laceration or abrasion, hard palate intact without movement. No trismus. CT of head and face pending. Report given to Dr. Gibson.           Scribe Attestation:   Scribe #1: I performed the above scribed service and the documentation accurately describes the services I performed. I attest to the accuracy of the note.    Attending Attestation:     Physician Attestation Statement for NP/PA:   I have conducted a face to face encounter with this patient in addition to the NP/PA, due to NP/PA Request    Other NP/PA Attestation Additions:      Medical Decision Making: Francis Weinberg is a 62 y.o. male presenting with close head and right facial injury.  In regard to close head injury, he is low risk for intracranial hemorrhage but is anticoagulated on aspirin and head CT was performed to rule out intracranial hemorrhage.  This is pending.  He has a nonfocal neurological exam with cranial nerves II through XII intact, normal mental status, and 5 out of 5 strength and sensation bilaterally.  He does have maxillary facial tenderness with swelling also with edema to the nose with right-sided epistaxis controlled with direct pressure.  No nasal septal hematoma evident.  He does have bleeding at the superior gingival gumline although no mobility of the hard palate or dental injury.  No abnormal facial mobility.  Extraocular movements intact without entrapment.  Maxillofacial CT will be signed out to oncoming provider for anticipated disposition to surgery follow-up if fractures are evident.  Follow-up with PCP for head injury also discussed.                 ED Course       Clinical Impression:   There were no encounter diagnoses.                           Sabine Patterson NP  02/09/18 9513

## 2018-05-28 ENCOUNTER — OFFICE VISIT (OUTPATIENT)
Dept: INTERNAL MEDICINE | Facility: CLINIC | Age: 63
End: 2018-05-28
Payer: COMMERCIAL

## 2018-05-28 VITALS
BODY MASS INDEX: 24.83 KG/M2 | HEART RATE: 100 BPM | OXYGEN SATURATION: 97 % | WEIGHT: 187.38 LBS | SYSTOLIC BLOOD PRESSURE: 144 MMHG | HEIGHT: 73 IN | DIASTOLIC BLOOD PRESSURE: 92 MMHG

## 2018-05-28 DIAGNOSIS — Z00.00 HEALTH CARE MAINTENANCE: Primary | ICD-10-CM

## 2018-05-28 DIAGNOSIS — N52.9 ERECTILE DYSFUNCTION, UNSPECIFIED ERECTILE DYSFUNCTION TYPE: ICD-10-CM

## 2018-05-28 DIAGNOSIS — D56.9 THALASSEMIA, UNSPECIFIED TYPE: ICD-10-CM

## 2018-05-28 DIAGNOSIS — R03.0 ELEVATED BLOOD PRESSURE READING: ICD-10-CM

## 2018-05-28 PROCEDURE — 99396 PREV VISIT EST AGE 40-64: CPT | Mod: S$GLB,,, | Performed by: INTERNAL MEDICINE

## 2018-05-28 PROCEDURE — 99999 PR PBB SHADOW E&M-EST. PATIENT-LVL III: CPT | Mod: PBBFAC,,, | Performed by: INTERNAL MEDICINE

## 2018-05-28 RX ORDER — TADALAFIL 20 MG/1
20 TABLET ORAL EVERY OTHER DAY
Qty: 6 TABLET | Refills: 11 | Status: SHIPPED | OUTPATIENT
Start: 2018-05-28 | End: 2019-06-26 | Stop reason: SDUPTHER

## 2018-05-28 NOTE — PROGRESS NOTES
"Subjective:       Patient ID: Francis Weinberg is a 62 y.o. male.    Chief Complaint: Annual Exam    HPI   Francis Weinberg is a 62 y.o. male here for a yearly preventative healthcare visit.     Hx borderline HTN.     ED - previously on cialis prn     Anemia 2/2 thalassemia    He had slip on the steps and broke nose. Now has anti-skid strips on step.     Review of Systems   Constitutional: Negative for fever.   HENT: Negative.    Eyes: Negative.    Respiratory: Negative for shortness of breath.    Cardiovascular: Negative for chest pain and leg swelling.   Gastrointestinal: Negative for abdominal pain, diarrhea, nausea and vomiting.   Genitourinary: Negative.    Musculoskeletal: Negative for arthralgias.   Skin: Negative for rash.   Psychiatric/Behavioral: Negative.        Objective:   BP (!) 144/92   Pulse 100   Ht 6' 1" (1.854 m)   Wt 85 kg (187 lb 6.3 oz)   SpO2 97%   BMI 24.72 kg/m²      Physical Exam   Constitutional: He is oriented to person, place, and time. He appears well-developed and well-nourished.   HENT:   Head: Normocephalic and atraumatic.   Eyes: Conjunctivae and EOM are normal. Pupils are equal, round, and reactive to light.   Neck: Neck supple. No thyromegaly present.   Cardiovascular: Normal rate, regular rhythm and normal heart sounds.    No murmur heard.  Pulmonary/Chest: Effort normal and breath sounds normal. No respiratory distress. He has no wheezes.   Abdominal: Soft. Bowel sounds are normal. He exhibits no distension. There is no tenderness.   Musculoskeletal: Normal range of motion.   Neurological: He is alert and oriented to person, place, and time.   Skin: Skin is warm and dry. No rash noted.   Psychiatric: He has a normal mood and affect. Judgment and thought content normal.   Vitals reviewed.      Assessment:       1. Health care maintenance    2. Thalassemia, unspecified type    3. Elevated blood pressure reading    4. Erectile dysfunction, unspecified erectile dysfunction type      "   Plan:       Francis was seen today for annual exam.    Diagnoses and all orders for this visit:    Health care maintenance  -     CBC auto differential; Future  -     Comprehensive metabolic panel; Future  -     Lipid panel; Future  -     TSH; Future    Thalassemia, unspecified type  -     CBC auto differential; Future    Elevated blood pressure reading   Watch salt in diet   BP log   Recheck 3 months    Erectile dysfunction, unspecified erectile dysfunction type  -     tadalafil (CIALIS) 20 MG Tab; Take 1 tablet (20 mg total) by mouth every other day. Take every other day as needed

## 2019-06-26 ENCOUNTER — OFFICE VISIT (OUTPATIENT)
Dept: INTERNAL MEDICINE | Facility: CLINIC | Age: 64
End: 2019-06-26
Payer: COMMERCIAL

## 2019-06-26 ENCOUNTER — LAB VISIT (OUTPATIENT)
Dept: LAB | Facility: HOSPITAL | Age: 64
End: 2019-06-26
Attending: INTERNAL MEDICINE
Payer: COMMERCIAL

## 2019-06-26 ENCOUNTER — TELEPHONE (OUTPATIENT)
Dept: INTERNAL MEDICINE | Facility: CLINIC | Age: 64
End: 2019-06-26

## 2019-06-26 ENCOUNTER — IMMUNIZATION (OUTPATIENT)
Dept: PHARMACY | Facility: CLINIC | Age: 64
End: 2019-06-26
Payer: COMMERCIAL

## 2019-06-26 ENCOUNTER — PATIENT MESSAGE (OUTPATIENT)
Dept: ADMINISTRATIVE | Facility: OTHER | Age: 64
End: 2019-06-26

## 2019-06-26 VITALS
SYSTOLIC BLOOD PRESSURE: 134 MMHG | WEIGHT: 194 LBS | BODY MASS INDEX: 25.71 KG/M2 | DIASTOLIC BLOOD PRESSURE: 86 MMHG | HEIGHT: 73 IN

## 2019-06-26 DIAGNOSIS — Z12.5 SPECIAL SCREENING FOR MALIGNANT NEOPLASM OF PROSTATE: ICD-10-CM

## 2019-06-26 DIAGNOSIS — Z00.00 HEALTH CARE MAINTENANCE: Primary | ICD-10-CM

## 2019-06-26 DIAGNOSIS — N52.9 ERECTILE DYSFUNCTION, UNSPECIFIED ERECTILE DYSFUNCTION TYPE: Primary | ICD-10-CM

## 2019-06-26 DIAGNOSIS — I10 ESSENTIAL HYPERTENSION: ICD-10-CM

## 2019-06-26 DIAGNOSIS — N52.9 ERECTILE DYSFUNCTION, UNSPECIFIED ERECTILE DYSFUNCTION TYPE: ICD-10-CM

## 2019-06-26 DIAGNOSIS — R03.0 BORDERLINE HYPERTENSION: ICD-10-CM

## 2019-06-26 LAB
BACTERIA #/AREA URNS AUTO: ABNORMAL /HPF
BILIRUB UR QL STRIP: NEGATIVE
CLARITY UR REFRACT.AUTO: ABNORMAL
COLOR UR AUTO: YELLOW
GLUCOSE UR QL STRIP: NEGATIVE
HGB UR QL STRIP: NEGATIVE
HYALINE CASTS UR QL AUTO: 3 /LPF
KETONES UR QL STRIP: NEGATIVE
LEUKOCYTE ESTERASE UR QL STRIP: NEGATIVE
MICROSCOPIC COMMENT: ABNORMAL
NITRITE UR QL STRIP: NEGATIVE
PH UR STRIP: 6 [PH] (ref 5–8)
PROT UR QL STRIP: ABNORMAL
RBC #/AREA URNS AUTO: 0 /HPF (ref 0–4)
SP GR UR STRIP: >=1.03 (ref 1–1.03)
SQUAMOUS #/AREA URNS AUTO: 0 /HPF
URN SPEC COLLECT METH UR: ABNORMAL
WBC #/AREA URNS AUTO: 0 /HPF (ref 0–5)

## 2019-06-26 PROCEDURE — 99397 PER PM REEVAL EST PAT 65+ YR: CPT | Mod: S$GLB,,, | Performed by: INTERNAL MEDICINE

## 2019-06-26 PROCEDURE — 81001 URINALYSIS AUTO W/SCOPE: CPT

## 2019-06-26 PROCEDURE — 99999 PR PBB SHADOW E&M-EST. PATIENT-LVL III: CPT | Mod: PBBFAC,,, | Performed by: INTERNAL MEDICINE

## 2019-06-26 PROCEDURE — 99213 OFFICE O/P EST LOW 20 MIN: CPT | Mod: PBBFAC | Performed by: INTERNAL MEDICINE

## 2019-06-26 PROCEDURE — 99999 PR PBB SHADOW E&M-EST. PATIENT-LVL III: ICD-10-PCS | Mod: PBBFAC,,, | Performed by: INTERNAL MEDICINE

## 2019-06-26 PROCEDURE — 36415 COLL VENOUS BLD VENIPUNCTURE: CPT

## 2019-06-26 PROCEDURE — 84153 ASSAY OF PSA TOTAL: CPT

## 2019-06-26 PROCEDURE — 99397 PR PREVENTIVE VISIT,EST,65 & OVER: ICD-10-PCS | Mod: S$GLB,,, | Performed by: INTERNAL MEDICINE

## 2019-06-26 RX ORDER — SILDENAFIL 100 MG/1
50-100 TABLET, FILM COATED ORAL DAILY PRN
Qty: 12 TABLET | Refills: 3 | Status: SHIPPED | OUTPATIENT
Start: 2019-06-26 | End: 2020-04-15

## 2019-06-26 RX ORDER — TADALAFIL 20 MG/1
20 TABLET ORAL EVERY OTHER DAY
Qty: 6 TABLET | Refills: 11 | Status: SHIPPED | OUTPATIENT
Start: 2019-06-26 | End: 2019-10-28 | Stop reason: SDUPTHER

## 2019-06-26 NOTE — PROGRESS NOTES
"Subjective:       Patient ID: Francis Weinberg is a 63 y.o. male.    Chief Complaint: Annual Exam (yearly check up.) and Colonoscopy (patient was asking if hes due to have procedure. )    HPI   Francis Weinberg is a 63 y.o. male here for a yearly preventative healthcare visit.     Hx borderline HTN.   Reports his systolic BP was 120-130 at home then few days ago had fried chicken and elevated since then. Checking with wrist cuff.     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   Review of Systems   Constitutional: Negative for fever.   HENT: Negative.    Eyes: Negative.    Respiratory: Negative for shortness of breath.    Cardiovascular: Negative for chest pain and leg swelling.   Gastrointestinal: Negative for abdominal pain, diarrhea, nausea and vomiting.   Genitourinary: Negative.    Musculoskeletal: Negative for arthralgias.   Skin: Negative for rash.   Psychiatric/Behavioral: Negative.        Objective:   /86   Ht 6' 1" (1.854 m)   Wt 88 kg (194 lb)   BMI 25.60 kg/m²      Physical Exam   Constitutional: He is oriented to person, place, and time. He appears well-developed and well-nourished.   HENT:   Head: Normocephalic and atraumatic.   Eyes: Pupils are equal, round, and reactive to light. Conjunctivae and EOM are normal.   Neck: Neck supple. No thyromegaly present.   Cardiovascular: Normal rate, regular rhythm and normal heart sounds.   No murmur heard.  Pulmonary/Chest: Effort normal and breath sounds normal. No respiratory distress. He has no wheezes.   Abdominal: Soft. Bowel sounds are normal. He exhibits no distension. There is no tenderness.   Musculoskeletal: Normal range of motion.   Neurological: He is alert and oriented to person, place, and time.   Skin: Skin is warm and dry. No rash noted.   Psychiatric: He has a normal mood and affect. Judgment and thought content normal.   Vitals reviewed.      Assessment:       1. Health " care maintenance    2. Special screening for malignant neoplasm of prostate    3. Borderline hypertension    4. Erectile dysfunction, unspecified erectile dysfunction type    5. Essential hypertension        Plan:       Francis was seen today for annual exam and colonoscopy.    Diagnoses and all orders for this visit:    Health care maintenance  -     Case request GI: COLONOSCOPY  Cmp, cbc, lipid, tsh  -     Urinalysis    Special screening for malignant neoplasm of prostate  -     PSA, Screening; Future    Borderline hypertension  -     Urinalysis    Erectile dysfunction, unspecified erectile dysfunction type  -     tadalafil (CIALIS) 20 MG Tab; Take 1 tablet (20 mg total) by mouth every other day. Take every other day as needed    Essential hypertension  -     NURSING COMMUNICATION: Create [facility] Account  -     Hypertension Digital Medicine (HDMP) Enrollment Order  -     Hypertension Digital Medicine (HDMP): Assign Onboarding Questionnaires

## 2019-06-26 NOTE — TELEPHONE ENCOUNTER
----- Message from Gary Palma, PharmD sent at 6/26/2019  2:18 PM CDT -----  Hello,     Patient was originally prescribed Cialis 20 mg (639.68 for 6 tablets) , but the cost of the medication is expensive. [facility] does not have discount for Cialis, but [facility] do have a discount price for generic Viagra.     Sildenafil 100 mg tablet = $2.25 per tablet.     Or     Sildenafil 20 mg tablet = $1.00 per tablet.       If appropriate, can provider please send in new Rx for Sildenafil 100 mg or 20 mg?     Thanks,   Gary Palma PharmD.

## 2019-06-27 LAB — COMPLEXED PSA SERPL-MCNC: 1.6 NG/ML (ref 0–4)

## 2019-07-12 ENCOUNTER — PATIENT OUTREACH (OUTPATIENT)
Dept: OTHER | Facility: OTHER | Age: 64
End: 2019-07-12

## 2019-07-12 NOTE — PROGRESS NOTES
1st attempt to complete enrollment call. No answer, left voicemail.       Last 5 Patient Entered Readings                                      Current 30 Day Average: 149/105     Recent Readings 7/11/2019    SBP (mmHg) 149    DBP (mmHg) 105    Pulse 72

## 2019-07-12 NOTE — LETTER
October 10, 2019     Francis Lenard  [address] [facility] [address]       Dear Francis,    Thank you for your interest in [facility], a clinically-proven program designed to help you manage your chronic condition more conveniently and effectively. [facility] gives you:   Technology that lets you monitor your health at home and send readings directly to your care team at [facility]   Medications managed by a dedicated pharmacist or clinician    A  who calls and helps you take manageable steps towards a healthier lifestyle       We have tried to reach you via phone and [facility] Message to complete your enrollment in the Digital Medicine program. Unfortunately, weve been unable to reach you.     Please call us to complete your enrollment. Our number is [phone]. If we dont hear from you by 10/31/2019, we will be unable to complete your enrollment at this time.     We look forward to hearing from you soon.    Sincerely,     The Digital Medicine Team

## 2019-07-12 NOTE — LETTER
November 22, 2019     Francis Lenard  [address] [address]       Dear Francis,    Welcome to [facility]! Our goal is to make care effective, proactive and convenient by using data you send us from home to better treat your chronic conditions.              My name is Justin Neal, and I am your dedicated Digital Medicine clinician. As an expert in medication management, I will help ensure that the medications you are taking continue to provide the intended benefits and help you reach your goals. You can reach me directly at [phone] or by sending me a message directly through your [facility] account.      I am Rita Carvajal and I will be your health . My job is to help you identify lifestyle changes to improve your disease control. We will talk about nutrition, exercise, and other ways you may be able to adjust your current habits to better your health. Additionally, we will help ensure you are completing the tests and screenings that are necessary to help manage your conditions. You can reach me directly at  or by sending me a message directly through your [facility] account.    Most importantly, YOU are at the center of this team. Together, we will work to improve your overall health and encourage you to meet your goals for a healthier lifestyle.     What we expect from YOU:  · Please take frequent home blood pressure measurements. We ask that you take at least 1 blood pressure reading per week, but more information will better help us get you know you. Be sure you rest for a few minutes before taking the reading in a quiet, comfortable place.     Be available to receive phone calls or [facility] messages, when appropriate, from your care team. Please let us know if there are any specific days or times that work best for us to reach you via phone.     Complete routine tests and screenings. Dont worry, we will help keep you on track!           What you should expect from your  Digital Medicine Care Team:   We will work with you to create a personalized plan of care and provide you with encouragement and education, including regarding lifestyle changes, that could help you manage your disease states.     We will adjust your current medications, if needed, and continue to monitor your long-term progress.     We will provide you and your physician with monthly progress reports after you have been in the program for more than 30 days.     We will send you reminders through [facility] and text messages to help ensure you do not miss any testing deadlines to help manage your disease states.    You will be able to reach us by phone or through your [facility] account by clicking our names under Care Team on the right side of the home screen.    I look forward to working with you to achieve your blood pressure goals!    We look forward to working with you to help manage your health,    Sincerely,    Your Digital Medicine Team    Please visit our websites to learn more:   · Hypertension: [email or web address]      Remember, we are not available for emergencies. If you have an emergency, please contact your doctors office directly or call [facility] on-call ([phone] or [phone]) or 911.

## 2019-07-12 NOTE — LETTER
October 10, 2019     Francis Lenard  [address] [address]       Dear Francis,    Thank you for your interest in [facility], a clinically-proven program designed to help you manage your chronic condition more conveniently and effectively. [facility] gives you:   Technology that lets you monitor your health at home and send readings directly to your care team at [facility]   Medications managed by a dedicated pharmacist or clinician    A  who calls and helps you take manageable steps towards a healthier lifestyle       We have tried to reach you via phone and [facility] Message to complete your enrollment in the Digital Medicine program. Unfortunately, weve been unable to reach you.     Please call us to complete your enrollment. Our number is [phone]. If we dont hear from you by 10/31/2019, we will be unable to complete your enrollment at this time.     We look forward to hearing from you soon.    Sincerely,     The Digital Medicine Team

## 2019-07-12 NOTE — LETTER
November 22, 2019     Francis Lenard  [address] [facility] [address] [facility] [address]       Dear Francis,    [facility]! Our goal is to make care effective, proactive and convenient by using data you send us from home to better treat your chronic conditions.              My name is Justin Neal, and I am your dedicated Digital Medicine clinician. As an expert in medication management, I will help ensure that the medications you are taking continue to provide the intended benefits and help you reach your goals. You can reach me directly at [phone] or by sending me a message directly through your [facility] account.      I am Rita Carvajal and I will be your health . My job is to help you identify lifestyle changes to improve your disease control. We will talk about nutrition, exercise, and other ways you may be able to adjust your current habits to better your health. Additionally, we will help ensure you are completing the tests and screenings that are necessary to help manage your conditions. You can reach me directly at  or by sending me a message directly through your [facility] account.    Most importantly, YOU are at the center of this team. Together, we will work to improve your overall health and encourage you to meet your goals for a healthier lifestyle.     What we expect from YOU:  · Please take frequent home blood pressure measurements. We ask that you take at least 1 blood pressure reading per week, but more information will better help us get you know you. Be sure you rest for a few minutes before taking the reading in a quiet, comfortable place.     Be available to receive phone calls or [facility] messages, when appropriate, from your care team. Please let us know if there are any specific days or times that work best for us to reach you via phone.     Complete routine tests and screenings. Dont worry, we will help keep you on track!           What you should expect from your  Digital Medicine Care Team:   We will work with you to create a personalized plan of care and provide you with encouragement and education, including regarding lifestyle changes, that could help you manage your disease states.     We will adjust your current medications, if needed, and continue to monitor your long-term progress.     We will provide you and your physician with monthly progress reports after you have been in the program for more than 30 days.     We will send you reminders through [facility] and text messages to help ensure you do not miss any testing deadlines to help manage your disease states.    You will be able to reach us by phone or through your [facility] account by clicking our names under Care Team on the right side of the home screen.    I look forward to working with you to achieve your blood pressure goals!    We look forward to working with you to help manage your health,    Sincerely,    Your Digital Medicine Team    Please visit our websites to learn more:   · Hypertension: [email or web address]      Remember, we are not available for emergencies. If you have an emergency, please contact your doctors office directly or call [facility]call ([phone] or [phone]) or 911.

## 2019-07-19 NOTE — PROGRESS NOTES
2nd attempt for enrollment call.  No answer, left voicemail.  Sent my portal message.      Last 5 Patient Entered Readings                                      Current 30 Day Average: 149/105     Recent Readings 7/11/2019    SBP (mmHg) 149    DBP (mmHg) 105    Pulse 72

## 2019-07-19 NOTE — PROGRESS NOTES
Last 5 Patient Entered Readings                                      Current 30 Day Average: 149/105     Recent Readings 7/11/2019    SBP (mmHg) 149    DBP (mmHg) 105    Pulse 72

## 2019-07-26 NOTE — PROGRESS NOTES
3rd attempt for enrollment call.  No answer, left voicemail.      Last 5 Patient Entered Readings                                      Current 30 Day Average: 149/105     Recent Readings 7/11/2019    SBP (mmHg) 149    DBP (mmHg) 105    Pulse 72

## 2019-07-27 ENCOUNTER — PATIENT MESSAGE (OUTPATIENT)
Dept: ENDOSCOPY | Facility: HOSPITAL | Age: 64
End: 2019-07-27

## 2019-08-02 NOTE — PROGRESS NOTES
4th attempt to complete enrollment call. No answer, left voicemail.       Last 5 Patient Entered Readings                                      Current 30 Day Average: 149/105     Recent Readings 7/11/2019    SBP (mmHg) 149    DBP (mmHg) 105    Pulse 72

## 2019-09-06 NOTE — PROGRESS NOTES
5th attempt for enrollment call.  No answer, left voicemail.      Last 5 Patient Entered Readings                                      Current 30 Day Average:      Recent Readings 7/11/2019    SBP (mmHg) 149    DBP (mmHg) 105    Pulse 72

## 2019-10-10 NOTE — PROGRESS NOTES
Digital Medicine Program Enrollment    Hypertension       6th attempt for enrollment call.  No answer, left voicemail. Sent pt incomplete enrollment letter.

## 2019-10-10 NOTE — PROGRESS NOTES
Digital Medicine Program Enrollment  HPI     6th attempt for enrollment call.  No answer, left voicemail. Also, sent incomplete enrollment letter.

## 2019-10-11 ENCOUNTER — HOSPITAL ENCOUNTER (EMERGENCY)
Facility: HOSPITAL | Age: 64
Discharge: HOME | End: 2019-10-12
Attending: INTERNAL MEDICINE
Payer: COMMERCIAL

## 2019-10-11 DIAGNOSIS — I95.9 HYPOTENSION: ICD-10-CM

## 2019-10-11 DIAGNOSIS — I95.1 ORTHOSTATIC HYPOTENSION: ICD-10-CM

## 2019-10-11 DIAGNOSIS — R55 NEAR SYNCOPE: Primary | ICD-10-CM

## 2019-10-11 LAB
ALBUMIN SERPL BCP-MCNC: 3.7 G/DL (ref 3.5–5.2)
ALP SERPL-CCNC: 75 U/L (ref 55–135)
ALT SERPL W/O P-5'-P-CCNC: 29 U/L (ref 10–44)
ANION GAP SERPL CALC-SCNC: 12 MMOL/L (ref 8–16)
AST SERPL-CCNC: 20 U/L (ref 10–40)
BASOPHILS # BLD AUTO: 0.03 K/UL (ref 0–0.2)
BASOPHILS NFR BLD: 0.4 % (ref 0–1.9)
BILIRUB SERPL-MCNC: 0.5 MG/DL (ref 0.1–1)
BUN SERPL-MCNC: 19 MG/DL (ref 8–23)
CALCIUM SERPL-MCNC: 8.3 MG/DL (ref 8.7–10.5)
CHLORIDE SERPL-SCNC: 108 MMOL/L (ref 95–110)
CO2 SERPL-SCNC: 23 MMOL/L (ref 23–29)
CREAT SERPL-MCNC: 1.1 MG/DL (ref 0.5–1.4)
DIFFERENTIAL METHOD: ABNORMAL
EOSINOPHIL # BLD AUTO: 0.1 K/UL (ref 0–0.5)
EOSINOPHIL NFR BLD: 1.3 % (ref 0–8)
ERYTHROCYTE [DISTWIDTH] IN BLOOD BY AUTOMATED COUNT: 15.1 % (ref 11.5–14.5)
EST. GFR  (AFRICAN AMERICAN): >60 ML/MIN/1.73 M^2
EST. GFR  (NON AFRICAN AMERICAN): >60 ML/MIN/1.73 M^2
GLUCOSE SERPL-MCNC: 104 MG/DL (ref 70–110)
HCT VFR BLD AUTO: 43.2 % (ref 40–54)
HGB BLD-MCNC: 12.9 G/DL (ref 14–18)
IMM GRANULOCYTES # BLD AUTO: 0.02 K/UL (ref 0–0.04)
IMM GRANULOCYTES NFR BLD AUTO: 0.3 % (ref 0–0.5)
LYMPHOCYTES # BLD AUTO: 1.2 K/UL (ref 1–4.8)
LYMPHOCYTES NFR BLD: 17.1 % (ref 18–48)
MCH RBC QN AUTO: 23.3 PG (ref 27–31)
MCHC RBC AUTO-ENTMCNC: 29.9 G/DL (ref 32–36)
MCV RBC AUTO: 78 FL (ref 82–98)
MONOCYTES # BLD AUTO: 0.4 K/UL (ref 0.3–1)
MONOCYTES NFR BLD: 5.8 % (ref 4–15)
NEUTROPHILS # BLD AUTO: 5.3 K/UL (ref 1.8–7.7)
NEUTROPHILS NFR BLD: 75.1 % (ref 38–73)
NRBC BLD-RTO: 0 /100 WBC
PLATELET # BLD AUTO: 178 K/UL (ref 150–350)
PMV BLD AUTO: 11.5 FL (ref 9.2–12.9)
POTASSIUM SERPL-SCNC: 3.9 MMOL/L (ref 3.5–5.1)
PROT SERPL-MCNC: 6.8 G/DL (ref 6–8.4)
RBC # BLD AUTO: 5.54 M/UL (ref 4.6–6.2)
SODIUM SERPL-SCNC: 143 MMOL/L (ref 136–145)
WBC # BLD AUTO: 7.07 K/UL (ref 3.9–12.7)

## 2019-10-11 PROCEDURE — 80053 COMPREHEN METABOLIC PANEL: CPT

## 2019-10-11 PROCEDURE — 63600175 PHARM REV CODE 636 W HCPCS: Performed by: INTERNAL MEDICINE

## 2019-10-11 PROCEDURE — 99284 EMERGENCY DEPT VISIT MOD MDM: CPT | Mod: 25

## 2019-10-11 PROCEDURE — 93010 ELECTROCARDIOGRAM REPORT: CPT | Mod: ,,, | Performed by: INTERNAL MEDICINE

## 2019-10-11 PROCEDURE — 85025 COMPLETE CBC W/AUTO DIFF WBC: CPT

## 2019-10-11 PROCEDURE — 96361 HYDRATE IV INFUSION ADD-ON: CPT

## 2019-10-11 PROCEDURE — 93005 ELECTROCARDIOGRAM TRACING: CPT

## 2019-10-11 PROCEDURE — 93010 EKG 12-LEAD: ICD-10-PCS | Mod: ,,, | Performed by: INTERNAL MEDICINE

## 2019-10-11 PROCEDURE — 96360 HYDRATION IV INFUSION INIT: CPT

## 2019-10-11 RX ORDER — SODIUM CHLORIDE 9 MG/ML
1000 INJECTION, SOLUTION INTRAVENOUS
Status: COMPLETED | OUTPATIENT
Start: 2019-10-11 | End: 2019-10-11

## 2019-10-11 RX ADMIN — SODIUM CHLORIDE 1000 ML: 0.9 INJECTION, SOLUTION INTRAVENOUS at 09:10

## 2019-10-11 RX ADMIN — SODIUM CHLORIDE 1000 ML: 0.9 INJECTION, SOLUTION INTRAVENOUS at 11:10

## 2019-10-12 VITALS
HEART RATE: 87 BPM | HEIGHT: 73 IN | BODY MASS INDEX: 27.17 KG/M2 | WEIGHT: 205 LBS | DIASTOLIC BLOOD PRESSURE: 77 MMHG | OXYGEN SATURATION: 96 % | SYSTOLIC BLOOD PRESSURE: 120 MMHG | TEMPERATURE: 98 F | RESPIRATION RATE: 26 BRPM

## 2019-10-12 NOTE — ED PROVIDER NOTES
Encounter Date: 10/11/2019       History     Chief Complaint   Patient presents with    Hypotension    Loss of Consciousness     Patient is a 64-year-old male that presented to the emergency department by EMS [facility]  Patient states he was in a sitting position and when he stood up he had feelings of weakness and had an episode of near-syncope.  Patient now denies any pain denies any injury.  Patient has a past medical history significant for anemia hypertension and cataract.  Patient denies any recent intercurrent illnesses and states no fever or chills has had no nausea or vomiting associated with this episode.  Patient states that he took a Viagra a couple of hours prior to this episode.  Patient also states he was eating and they were rushed.  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     Borderline hypertension     Cataract     Essential hypertension 6/26/2019     Past Surgical History:   Procedure Laterality Date    CATARACT EXTRACTION W/  INTRAOCULAR LENS IMPLANT  2/27/14    Right Eye     Family History   Problem Relation Age of Onset    Cataracts Mother     Hypertension Mother     Cerebral aneurysm Father     No Known Problems Brother     No Known Problems Sister     No Known Problems Maternal Aunt     No Known Problems Maternal Uncle     No Known Problems Paternal Aunt     No Known Problems Paternal Uncle     No Known Problems Maternal Grandmother     No Known Problems Maternal Grandfather     No Known Problems Paternal Grandmother     No Known Problems Paternal Grandfather     Amblyopia Neg Hx     Blindness Neg Hx     Cancer Neg Hx     Diabetes Neg Hx     Glaucoma Neg Hx     Macular degeneration Neg Hx     Retinal detachment Neg Hx     Strabismus Neg Hx     Stroke Neg Hx      Thyroid disease Neg Hx      Social History     Tobacco Use    Smoking status: Never Smoker    Smokeless tobacco: Never Used   Substance Use Topics    Alcohol use: Yes     Frequency: Monthly or less     Drinks per session: 1 or 2     Comment: prevously socially, stopped for Lent 2019 and now rarely    Drug use: No     Review of Systems   Constitutional: Negative for activity change, appetite change, fatigue and fever.   HENT: Negative for congestion, ear discharge, mouth sores, nosebleeds, rhinorrhea, sinus pressure, sinus pain and tinnitus.    Eyes: Negative.  Negative for pain, redness and itching.   Respiratory: Negative for apnea, cough, choking, chest tightness, shortness of breath, wheezing and stridor.    Cardiovascular: Negative for chest pain, palpitations and leg swelling.   Gastrointestinal: Negative for abdominal distention, abdominal pain, anal bleeding, blood in stool, constipation and diarrhea.   Endocrine: Negative.    Genitourinary: Negative for difficulty urinating, flank pain, frequency and urgency.   Musculoskeletal: Negative for arthralgias, back pain, gait problem, joint swelling and myalgias.   Skin: Negative for color change and pallor.   Allergic/Immunologic: Negative.    Neurological: Positive for dizziness, syncope and light-headedness. Negative for facial asymmetry, weakness and headaches.   Hematological: Negative for adenopathy. Does not bruise/bleed easily.   Psychiatric/Behavioral: The patient is nervous/anxious.        Physical Exam     Initial Vitals [10/11/19 2139]   BP Pulse Resp Temp SpO2   (!) 90/59 68 20 97.6 °F (36.4 °C) 95 %      MAP       --         Physical Exam    Nursing note and vitals reviewed.  Constitutional: He appears well-developed and well-nourished.   HENT:   Head: Normocephalic and atraumatic.   Eyes: EOM are normal. Pupils are equal, round, and reactive to light.   Neck: Normal range of motion. Neck supple.   Cardiovascular: Normal rate, regular rhythm,  normal heart sounds and intact distal pulses.   Abdominal: Soft. Bowel sounds are normal.   Musculoskeletal: Normal range of motion.   Neurological: He is alert and oriented to person, place, and time.   Skin: Skin is warm. Capillary refill takes less than 2 seconds.   Psychiatric: He has a normal mood and affect. Thought content normal.         ED Course   Procedures  Labs Reviewed   CBC W/ AUTO DIFFERENTIAL - Abnormal; Notable for the following components:       Result Value    Hemoglobin 12.9 (*)     Mean Corpuscular Volume 78 (*)     Mean Corpuscular Hemoglobin 23.3 (*)     Mean Corpuscular Hemoglobin Conc 29.9 (*)     RDW 15.1 (*)     Gran% 75.1 (*)     Lymph% 17.1 (*)     All other components within normal limits    Narrative:     Recoll. [id number] by [facility] at 10/11/2019 23:20, reason: Specimen   clotted. Spoke to Kiana Nelson RN ED for recollect.    10/11/2019  23:20 [facility]   COMPREHENSIVE METABOLIC PANEL - Abnormal; Notable for the following components:    Calcium 8.3 (*)     All other components within normal limits          Imaging Results    None                               Clinical Impression:       ICD-10-CM ICD-9-CM   1. Near syncope R55 780.2   2. Hypotension I95.9 458.9   3. Orthostatic hypotension I95.1 458.0                                Mihir Hall MD  10/22/19 [id number]

## 2019-10-12 NOTE — ED NOTES
Patient to ED via [facility] following syncopal episode at [facility]. During medication reconciliation, patient admits taking Viagra PTA.

## 2019-10-15 ENCOUNTER — OFFICE VISIT (OUTPATIENT)
Dept: INTERNAL MEDICINE | Facility: CLINIC | Age: 64
End: 2019-10-15
Payer: COMMERCIAL

## 2019-10-15 ENCOUNTER — IMMUNIZATION (OUTPATIENT)
Dept: PHARMACY | Facility: CLINIC | Age: 64
End: 2019-10-15

## 2019-10-15 ENCOUNTER — IMMUNIZATION (OUTPATIENT)
Dept: PHARMACY | Facility: CLINIC | Age: 64
End: 2019-10-15
Payer: COMMERCIAL

## 2019-10-15 ENCOUNTER — TELEPHONE (OUTPATIENT)
Dept: INTERNAL MEDICINE | Facility: CLINIC | Age: 64
End: 2019-10-15

## 2019-10-15 VITALS
DIASTOLIC BLOOD PRESSURE: 96 MMHG | HEIGHT: 73 IN | BODY MASS INDEX: 27.43 KG/M2 | WEIGHT: 207 LBS | SYSTOLIC BLOOD PRESSURE: 158 MMHG

## 2019-10-15 DIAGNOSIS — I10 ESSENTIAL HYPERTENSION: Primary | ICD-10-CM

## 2019-10-15 PROCEDURE — 99999 PR PBB SHADOW E&M-EST. PATIENT-LVL III: ICD-10-PCS | Mod: PBBFAC,,, | Performed by: INTERNAL MEDICINE

## 2019-10-15 PROCEDURE — 99214 PR OFFICE/OUTPT VISIT, EST, LEVL IV, 30-39 MIN: ICD-10-PCS | Mod: S$GLB,,, | Performed by: INTERNAL MEDICINE

## 2019-10-15 PROCEDURE — 99214 OFFICE O/P EST MOD 30 MIN: CPT | Mod: S$GLB,,, | Performed by: INTERNAL MEDICINE

## 2019-10-15 PROCEDURE — 99999 PR PBB SHADOW E&M-EST. PATIENT-LVL III: CPT | Mod: PBBFAC,,, | Performed by: INTERNAL MEDICINE

## 2019-10-15 NOTE — PROGRESS NOTES
"Subjective:       Patient ID: Francis Weinberg is a 64 y.o. male.    Chief Complaint: Follow-up (routine follow up.)    HPI   65 yo M here for follow up of HTN.    He had an episode of profuse sweating after eating frog legs at restaurant. No hives, no sob. Stomach was queasy and had mild diarrhea which started that night. At ED 90/59.    Home 130-140/can't recall.    Reports he got the home cuff for digital medicine but did not answer phone calls form team. States he was worried about cost. Reassured him that there should be no charge after machine purchase and he is willing to enroll.    Review of Systems   Constitutional: Negative for fever.   Respiratory: Negative for shortness of breath.    Cardiovascular: Negative for chest pain.   Musculoskeletal: Negative.    Skin: Negative.        Objective:   BP (!) 158/96   Ht 6' 1" (1.854 m)   Wt 93.9 kg (207 lb)   BMI 27.31 kg/m²      Physical Exam   Constitutional: He is oriented to person, place, and time. He appears well-developed and well-nourished. No distress.   HENT:   Head: Normocephalic and atraumatic.   Cardiovascular: Normal rate and regular rhythm.   No murmur heard.  Pulmonary/Chest: Effort normal. No respiratory distress. He has no wheezes. He has no rales.   Neurological: He is alert and oriented to person, place, and time.   Skin: Skin is warm and dry. He is not diaphoretic.   Psychiatric: He has a normal mood and affect. His behavior is normal.       Assessment:       1. Essential hypertension        Plan:       Francis was seen today for follow-up.    Diagnoses and all orders for this visit:    Essential hypertension  Will enroll in digital medicine - email sent to [facility] med team  Nurse visit in 2 weeks for bp machine check and log review (if participating in digital medicine ok to cancel)  rtc 3 mo    HCM  Flu HD  shingrx        "

## 2019-10-15 NOTE — TELEPHONE ENCOUNTER
Mr. Weinberg states he is now willing to do digital medicine program. Can you reach out to him again?

## 2019-10-16 NOTE — PROGRESS NOTES
Digital Medicine Program Enrollment  HPI     Enrollment attempt made based on provider message received to call patient as patient was ready to complete the enrollment.  Got voicemail left pt message and also sent follow up message to provider.

## 2019-10-24 NOTE — PROGRESS NOTES
Digital Medicine Program Enrollment      Our goal is to get BP to consistently below 130/80mmHg and make the process convenient so patient can avoid extra trips to the office. Getting your blood pressure below 130/80mmHg (definition of control) will reduce your risk for heart attack, kidney failure, stroke and death (as well as kidney failure, eye disease, & dementia)      Reviewed that the Digital Medicine care team - consisting of a clinician and a health  - will follow the most current evidence-based national guidelines for treating your condition.  The health  will focus on lifestyle modifications and motivation while the clinician will focus on medication therapy.  The care team will review all data on a regular basis and reach out as needed.      Explained that one of the key parts of the program is communication with the care team.  Asked patient to respond to outreach attempts and complete questionnaires.  Stressed importance of medication adherence.      Explained that we expect patient to obtain several blood pressures per week at random times of day.  Instructed patient not to allow anyone else to use phone and monitoring device.  Confirmed appropriate BP monitoring technique.      Explained to patient that the digital medicine team is not available for emergencies.  Patient will call [facility] on-call ([phone] or [phone]) or [phone] if needed.

## 2019-10-28 DIAGNOSIS — N52.9 ERECTILE DYSFUNCTION, UNSPECIFIED ERECTILE DYSFUNCTION TYPE: ICD-10-CM

## 2019-10-28 RX ORDER — TADALAFIL 20 MG/1
20 TABLET ORAL EVERY OTHER DAY
Qty: 6 TABLET | Refills: 11 | Status: SHIPPED | OUTPATIENT
Start: 2019-10-28 | End: 2019-10-28 | Stop reason: SDUPTHER

## 2019-10-28 RX ORDER — TADALAFIL 20 MG/1
20 TABLET ORAL EVERY OTHER DAY
Qty: 6 TABLET | Refills: 11 | Status: SHIPPED | OUTPATIENT
Start: 2019-10-28 | End: 2020-04-15 | Stop reason: SDUPTHER

## 2019-10-28 NOTE — TELEPHONE ENCOUNTER
----- Message from Mack Land sent at 10/28/2019  3:16 PM CDT -----  Contact: Patient [phone]  RX request - refill or new RX.  Is this a refill or new RX:  Refill  RX name and strength: tadalafil (CIALIS) 20 MG Tab    Pharmacy name and phone #   Primary care pharmacy     Please call an advise  Thank you

## 2019-10-29 ENCOUNTER — CLINICAL SUPPORT (OUTPATIENT)
Dept: INTERNAL MEDICINE | Facility: CLINIC | Age: 64
End: 2019-10-29
Payer: COMMERCIAL

## 2019-10-29 VITALS — SYSTOLIC BLOOD PRESSURE: 144 MMHG | DIASTOLIC BLOOD PRESSURE: 90 MMHG

## 2019-10-29 DIAGNOSIS — Z12.11 SPECIAL SCREENING FOR MALIGNANT NEOPLASMS, COLON: Primary | ICD-10-CM

## 2019-10-29 PROCEDURE — 99999 PR PBB SHADOW E&M-EST. PATIENT-LVL I: CPT | Mod: PBBFAC,,,

## 2019-10-29 PROCEDURE — 99999 PR PBB SHADOW E&M-EST. PATIENT-LVL I: ICD-10-PCS | Mod: PBBFAC,,,

## 2019-10-29 RX ORDER — SODIUM, POTASSIUM,MAG SULFATES 17.5-3.13G
1 SOLUTION, RECONSTITUTED, ORAL ORAL DAILY
Qty: 1 KIT | Refills: 0 | Status: SHIPPED | OUTPATIENT
Start: 2019-10-29 | End: 2019-10-31

## 2019-10-29 NOTE — PROGRESS NOTES
Pt came into clinic for BP check. BP was 144/90  Dr. Olson was informed. Pt brought in digital medicine cuff to change out at the [facility] since it was no longer working. Pt will check BP at home everyday and return next week for BP follow up.

## 2019-11-05 ENCOUNTER — CLINICAL SUPPORT (OUTPATIENT)
Dept: INTERNAL MEDICINE | Facility: CLINIC | Age: 64
End: 2019-11-05
Payer: COMMERCIAL

## 2019-11-05 ENCOUNTER — TELEPHONE (OUTPATIENT)
Dept: INTERNAL MEDICINE | Facility: CLINIC | Age: 64
End: 2019-11-05

## 2019-11-05 VITALS — SYSTOLIC BLOOD PRESSURE: 150 MMHG | DIASTOLIC BLOOD PRESSURE: 94 MMHG

## 2019-11-05 NOTE — PROGRESS NOTES
Pt came in for BP check. He did not follow plan as informed to do last week. Pt has not been checkin BP at home. He says he was out of town. /94  Advised pt to check BP at home   PCP notified

## 2019-11-05 NOTE — TELEPHONE ENCOUNTER
pt came in for BP check. He did not follow plan as informed to do last week. Pt has not been checking BP at home. He says he was out of town. /94  Advised pt to check BP at home. verbalized understanding

## 2019-11-05 NOTE — TELEPHONE ENCOUNTER
Blood pressure is above goal on today's check and at last visit. He probably needs blood pressure medicine. If willing to start amlodipine 5mg I can send in. If he would rather repeat nurse visit in 2 weeks with a log can do this.

## 2019-11-19 ENCOUNTER — TELEPHONE (OUTPATIENT)
Dept: INTERNAL MEDICINE | Facility: CLINIC | Age: 64
End: 2019-11-19

## 2019-11-19 ENCOUNTER — CLINICAL SUPPORT (OUTPATIENT)
Dept: INTERNAL MEDICINE | Facility: CLINIC | Age: 64
End: 2019-11-19
Payer: COMMERCIAL

## 2019-11-19 VITALS — DIASTOLIC BLOOD PRESSURE: 98 MMHG | SYSTOLIC BLOOD PRESSURE: 158 MMHG

## 2019-11-19 DIAGNOSIS — I10 ESSENTIAL HYPERTENSION: Primary | ICD-10-CM

## 2019-11-19 RX ORDER — AMLODIPINE BESYLATE 5 MG/1
5 TABLET ORAL DAILY
Qty: 90 TABLET | Refills: 3 | Status: SHIPPED | OUTPATIENT
Start: 2019-11-19 | End: 2020-01-14 | Stop reason: SDUPTHER

## 2019-11-19 NOTE — TELEPHONE ENCOUNTER
Pt came in for BP check today. Bp 158/98  Pt has been checking BP at home, he bought a log.  Recent BP readings:  151/80  141/77  137/78  122/73  128/79  139/82  He would like RX sent to [facility] in [place]

## 2019-11-19 NOTE — PROGRESS NOTES
Pt came in for BP check today. Bp 158/98  Pt has been checking BP at home, he bought a log.  Recent BP readings:  151/80  141/77  137/78  122/73  128/79  139/82  PCP notified

## 2019-11-19 NOTE — TELEPHONE ENCOUNTER
Start amlodipine 5 mg.  Sent to pharmacy on file.  Nurse visit in 4 weeks and follow up with me in January as scheduled.

## 2019-12-03 ENCOUNTER — ANESTHESIA EVENT (OUTPATIENT)
Dept: ENDOSCOPY | Facility: HOSPITAL | Age: 64
End: 2019-12-03
Payer: COMMERCIAL

## 2019-12-03 ENCOUNTER — ANESTHESIA (OUTPATIENT)
Dept: ENDOSCOPY | Facility: HOSPITAL | Age: 64
End: 2019-12-03
Payer: COMMERCIAL

## 2019-12-03 DIAGNOSIS — Z12.11 SPECIAL SCREENING FOR MALIGNANT NEOPLASMS, COLON: Primary | ICD-10-CM

## 2019-12-03 RX ORDER — SODIUM, POTASSIUM,MAG SULFATES 17.5-3.13G
1 SOLUTION, RECONSTITUTED, ORAL ORAL DAILY
Qty: 1 KIT | Refills: 0 | Status: SHIPPED | OUTPATIENT
Start: 2019-12-03 | End: 2019-12-05

## 2019-12-03 NOTE — ANESTHESIA PREPROCEDURE EVALUATION
12/03/2019  Francis Weinberg is a 64 y.o., male .    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     I have reviewed the Medications.     Review of Systems  Anesthesia Hx:  No problems with previous Anesthesia   Denies Personal Hx of Anesthesia complications.   Social:  Non-Smoker, Social Alcohol Use    Hematology/Oncology:  Hematology Normal   Oncology Normal     EENT/Dental:EENT/Dental Normal   Cardiovascular:   Exercise tolerance: good Hypertension, well controlled    Pulmonary:  Pulmonary Normal    Renal/:  Renal/ Normal     Hepatic/GI:  Hepatic/GI Normal Bowel Prep.    Musculoskeletal:  Musculoskeletal Normal    Neurological:  Neurology Normal    Endocrine:  Endocrine Normal    Dermatological:  Skin Normal    Psych:  Psychiatric Normal           Physical Exam  General:  Well nourished    Airway/Jaw/Neck:  Airway Findings: Mouth Opening: Normal Tongue: Normal  General Airway Assessment: Adult  Mallampati: I  Improves to I with phonation.  TM Distance: Normal, at least 6 cm  Jaw/Neck Findings:  Neck ROM: Normal ROM     Eyes/Ears/Nose:  Eyes/Ears/Nose Findings:    Dental:  Dental Findings: In tact   Chest/Lungs:  Chest/Lungs Clear    Heart/Vascular:  Heart Findings: Normal Heart  murmur: negative Vascular Findings: Normal    Abdomen:  Abdomen Findings: Normal    Musculoskeletal:  Musculoskeletal Findings: Normal   Skin:  Skin Findings: Normal    Mental Status:  Mental Status Findings: Normal        Anesthesia Plan  Type of Anesthesia, risks & benefits discussed:  Anesthesia Type:  general  Patient's Preference:   Intra-op Monitoring Plan: standard ASA monitors  Intra-op Monitoring Plan Comments:   Post Op Pain Control Plan:   Post Op Pain Control Plan Comments:   Induction:   IV  Beta Blocker:  Patient is not currently on a Beta-Blocker (No further documentation required).       Informed Consent: Patient understands risks and agrees with Anesthesia plan.  Questions answered. Anesthesia consent signed with patient.  ASA Score: 2     Day of Surgery Review of History & Physical:    H&P update referred to the surgeon.         Ready For Surgery From Anesthesia Perspective.

## 2019-12-31 ENCOUNTER — PATIENT OUTREACH (OUTPATIENT)
Dept: ADMINISTRATIVE | Facility: HOSPITAL | Age: 64
End: 2019-12-31

## 2020-01-06 ENCOUNTER — TELEPHONE (OUTPATIENT)
Dept: INTERNAL MEDICINE | Facility: CLINIC | Age: 65
End: 2020-01-06

## 2020-01-06 DIAGNOSIS — Z12.11 SPECIAL SCREENING FOR MALIGNANT NEOPLASMS, COLON: Primary | ICD-10-CM

## 2020-01-06 RX ORDER — POLYETHYLENE GLYCOL 3350, SODIUM SULFATE ANHYDROUS, SODIUM BICARBONATE, SODIUM CHLORIDE, POTASSIUM CHLORIDE 236; 22.74; 6.74; 5.86; 2.97 G/4L; G/4L; G/4L; G/4L; G/4L
4 POWDER, FOR SOLUTION ORAL ONCE
Qty: 4000 ML | Refills: 0 | Status: ON HOLD | OUTPATIENT
Start: 2020-01-06 | End: 2020-01-07 | Stop reason: HOSPADM

## 2020-01-06 NOTE — TELEPHONE ENCOUNTER
Spoke to patient. Patient stated that he is having a colonoscopy procedure done on tomorrow. Pt says that endoscopy staff needed an OK for medication that was prescribed to him. Per chart, should be the polyethylene glycol suspension.     Please advise.

## 2020-01-06 NOTE — TELEPHONE ENCOUNTER
----- Message from Jeanine Callejas sent at 1/6/2020 11:01 AM CST -----  Contact: Patient [phone]  Patient is requesting a asap about a medication for his procedure tomorrow.    Please call and advise.    Thank You

## 2020-01-07 ENCOUNTER — HOSPITAL ENCOUNTER (OUTPATIENT)
Facility: HOSPITAL | Age: 65
Discharge: HOME | End: 2020-01-07
Attending: COLON & RECTAL SURGERY | Admitting: COLON & RECTAL SURGERY
Payer: COMMERCIAL

## 2020-01-07 VITALS
HEIGHT: 73 IN | BODY MASS INDEX: 26.51 KG/M2 | WEIGHT: 200 LBS | RESPIRATION RATE: 14 BRPM | OXYGEN SATURATION: 97 % | DIASTOLIC BLOOD PRESSURE: 90 MMHG | TEMPERATURE: 97 F | SYSTOLIC BLOOD PRESSURE: 166 MMHG | HEART RATE: 80 BPM

## 2020-01-07 DIAGNOSIS — Z12.11 COLON CANCER SCREENING: Primary | ICD-10-CM

## 2020-01-07 PROCEDURE — G0121 COLON CA SCRN NOT HI RSK IND: HCPCS | Mod: ,,, | Performed by: COLON & RECTAL SURGERY

## 2020-01-07 PROCEDURE — 63600175 PHARM REV CODE 636 W HCPCS: Performed by: NURSE ANESTHETIST, CERTIFIED REGISTERED

## 2020-01-07 PROCEDURE — G0121 COLON CA SCRN NOT HI RSK IND: ICD-10-PCS | Mod: ,,, | Performed by: COLON & RECTAL SURGERY

## 2020-01-07 PROCEDURE — 37000008 HC ANESTHESIA 1ST 15 MINUTES: Performed by: COLON & RECTAL SURGERY

## 2020-01-07 PROCEDURE — 25000003 PHARM REV CODE 250: Performed by: NURSE ANESTHETIST, CERTIFIED REGISTERED

## 2020-01-07 PROCEDURE — 37000009 HC ANESTHESIA EA ADD 15 MINS: Performed by: COLON & RECTAL SURGERY

## 2020-01-07 PROCEDURE — G0121 COLON CA SCRN NOT HI RSK IND: HCPCS | Performed by: COLON & RECTAL SURGERY

## 2020-01-07 PROCEDURE — E9220 PRA ENDO ANESTHESIA: HCPCS | Mod: ,,, | Performed by: NURSE ANESTHETIST, CERTIFIED REGISTERED

## 2020-01-07 PROCEDURE — E9220 PRA ENDO ANESTHESIA: ICD-10-PCS | Mod: ,,, | Performed by: NURSE ANESTHETIST, CERTIFIED REGISTERED

## 2020-01-07 PROCEDURE — 63600175 PHARM REV CODE 636 W HCPCS: Performed by: COLON & RECTAL SURGERY

## 2020-01-07 RX ORDER — LIDOCAINE HCL/PF 100 MG/5ML
SYRINGE (ML) INTRAVENOUS
Status: DISCONTINUED | OUTPATIENT
Start: 2020-01-07 | End: 2020-01-07

## 2020-01-07 RX ORDER — PROPOFOL 10 MG/ML
VIAL (ML) INTRAVENOUS CONTINUOUS PRN
Status: DISCONTINUED | OUTPATIENT
Start: 2020-01-07 | End: 2020-01-07

## 2020-01-07 RX ORDER — SODIUM CHLORIDE 9 MG/ML
INJECTION, SOLUTION INTRAVENOUS CONTINUOUS
Status: DISCONTINUED | OUTPATIENT
Start: 2020-01-07 | End: 2020-01-07 | Stop reason: HOSPADM

## 2020-01-07 RX ORDER — GLYCOPYRROLATE 0.2 MG/ML
INJECTION INTRAMUSCULAR; INTRAVENOUS
Status: DISCONTINUED | OUTPATIENT
Start: 2020-01-07 | End: 2020-01-07

## 2020-01-07 RX ORDER — PROPOFOL 10 MG/ML
VIAL (ML) INTRAVENOUS
Status: DISCONTINUED | OUTPATIENT
Start: 2020-01-07 | End: 2020-01-07

## 2020-01-07 RX ADMIN — PROPOFOL 30 MG: 10 INJECTION, EMULSION INTRAVENOUS at 11:01

## 2020-01-07 RX ADMIN — LIDOCAINE HYDROCHLORIDE 100 MG: 20 INJECTION, SOLUTION INTRAVENOUS at 11:01

## 2020-01-07 RX ADMIN — PROPOFOL 100 MCG/KG/MIN: 10 INJECTION, EMULSION INTRAVENOUS at 11:01

## 2020-01-07 RX ADMIN — PROPOFOL 50 MG: 10 INJECTION, EMULSION INTRAVENOUS at 11:01

## 2020-01-07 RX ADMIN — GLYCOPYRROLATE 0.2 MG: 0.2 INJECTION, SOLUTION INTRAMUSCULAR; INTRAVENOUS at 11:01

## 2020-01-07 RX ADMIN — SODIUM CHLORIDE: 0.9 INJECTION, SOLUTION INTRAVENOUS at 10:01

## 2020-01-07 RX ADMIN — PROPOFOL 20 MG: 10 INJECTION, EMULSION INTRAVENOUS at 11:01

## 2020-01-07 NOTE — ANESTHESIA POSTPROCEDURE EVALUATION
Anesthesia Post Evaluation    Patient: Francis Weinberg    Procedure(s) Performed: Procedure(s) (LRB):  COLONOSCOPY (N/A)    Final Anesthesia Type: general    Patient location during evaluation: PACU  Patient participation: Yes- Able to Participate  Level of consciousness: awake and alert and oriented  Post-procedure vital signs: reviewed and stable  Pain management: adequate  Airway patency: patent    PONV status at discharge: No PONV  Anesthetic complications: no      Cardiovascular status: hemodynamically stable  Respiratory status: unassisted  Hydration status: euvolemic  Follow-up not needed.          Vitals Value Taken Time   /90 1/7/2020 12:04 PM   Temp 36.3 °C (97.3 °F) 1/7/2020 12:04 PM   Pulse 80 1/7/2020 12:04 PM   Resp 14 1/7/2020 12:04 PM   SpO2 97 % 1/7/2020 12:04 PM         No case tracking events are documented in the log.      Pain/Alisha Score: Alisha Score: 10 (1/7/2020 12:04 PM)

## 2020-01-07 NOTE — DISCHARGE INSTRUCTIONS
Colonoscopy     A camera attached to a flexible tube with a viewing lens is used to take video pictures.     Colonoscopy is a test to view the inside of your lower digestive tract (colon and rectum). Sometimes it can show the last part of the small intestine (ileum). During the test, small pieces of tissue may be removed for testing. This is called a biopsy. Small growths, such as polyps, may also be removed.   Why is colonoscopy done?  The test is done to help look for colon cancer. And it can help find the source of abdominal pain, bleeding, and changes in bowel habits. It may be needed once a year, depending on factors such as your:  · Age  · Health history  · Family health history  · Symptoms  · Results from any prior colonoscopy  Risks and possible complications  These include:  · Bleeding               · A puncture or tear in the colon   · Risks of anesthesia  · A cancer lesion not being seen  Getting ready   To prepare for the test:  · Talk with your healthcare provider about the risks of the test (see below). Also ask your healthcare provider about alternatives to the test.  · Tell your healthcare provider about any medicines you take. Also tell him or her about any health conditions you may have.  · Make sure your rectum and colon are empty for the test. Follow the diet and bowel prep instructions exactly. If you dont, the test may need to be rescheduled.  · Plan for a friend or family member to drive you home after the test.     Colonoscopy provides an inside view of the entire colon.     You may discuss the results with your doctor right away or at a future visit.  During the test   The test is usually done in the hospital on an outpatient basis. This means you go home the same day. The procedure takes about 30 minutes. During that time:  · You are given relaxing (sedating) medicine through an IV line. You may be drowsy, or fully asleep.  · The healthcare provider will first give you a physical exam to  [facility]    Physical Therapy Daily Treatment Note  Date:  2019    Patient Name:  Asif Graham    :  1969  MRN: [id number]  Restrictions/Precautions:    Medical/Treatment Diagnosis Information:  Diagnosis: S83.511A (ACL rupture)   Treatment Diagnosis: R knee pain; R knee weakness   Insurance/Certification information:  PT Insurance Information: [place]/ deductible/60 visits   Physician Information:  Referring Practitioner: Dr. Brie Nagel of care signed (Y/N):     Date of Patient follow up with Physician:     G-Code (if applicable):      Date G-Code Applied:         Progress Note: [x]  Yes  []  No  Next due by: Visit #10       Latex Allergy:  [x]NO      []YES  Preferred Language for Healthcare:   [x]English       []other:    Visit # Insurance Allowable   4 60     Pain level:  1/10     SUBJECTIVE:  Pt states he is working more on extension, but still struggling with full extension. OBJECTIVE:  3+ weeks s/p  Observation:   Test measurements:   R knee PROM 3-111     RESTRICTIONS/PRECAUTIONS: R ACL hamstring autograft, meniscal repair DOS 19    Exercises/Interventions:     Therapeutic Ex 30' Resistance Sets/sec Reps Notes   Retro Stepper/BIKE  6'  Full revolutions   Heel slides   Seated calf stretch   2  3 10  30s    Knee flexion  10 10 EOB   Quad sets   5\" 20 prone   3 way SLR  2 10 Flex/abd   LAQ  2 10 90-40   Clam ABD       Prone ext stretch  3'     Bridging  2 10 SB   Bridging + SLR  2 10    Leg Press Ecc 0-       Cybex HS curl       Wood pecker  1 10    Glute side walks       BOSU squat              Heel prop   1 min      Crutch training  2 min  One crutch   Manual Intervention 13'       Knee mobs/PROM  5'     IASTM  Patella Mobs  3 min      Knee ext mobs  5'                   NMR re-education 10'       NMS/Biofeedback 10/10  10'  With quad sets   G. Med activaiton/sidelying       G.  Max Activation/prone Hip Ext full ROM G. Activation       Bosu Bal and Prop- G Med       Single leg stance/Balance/Prop       Bosu Retro G. Med act       Tandem balance  1' 3               Therapeutic Exercise and NMR EXR  [x] (54350) Provided verbal/tactile cueing for activities related to strengthening, flexibility, endurance, ROM for improvements in LE, proximal hip, and core control with self care, mobility, lifting, ambulation.  [] (88474) Provided verbal/tactile cueing for activities related to improving balance, coordination, kinesthetic sense, posture, motor skill, proprioception  to assist with LE, proximal hip, and core control in self care, mobility, lifting, ambulation and eccentric single leg control.      NMR and Therapeutic Activities:    [x] (15771 or 88618) Provided verbal/tactile cueing for activities related to improving balance, coordination, kinesthetic sense, posture, motor skill, proprioception and motor activation to allow for proper function of core, proximal hip and LE with self care and ADLs  [] (54814) Gait Re-education- Provided training and instruction to the patient for proper LE, core and proximal hip recruitment and positioning and eccentric body weight control with ambulation re-education including up and down stairs     Home Exercise Program:    [x] (88267) Reviewed/Progressed HEP activities related to strengthening, flexibility, endurance, ROM of core, proximal hip and LE for functional self-care, mobility, lifting and ambulation/stair navigation   [] (27321)Reviewed/Progressed HEP activities related to improving balance, coordination, kinesthetic sense, posture, motor skill, proprioception of core, proximal hip and LE for self care, mobility, lifting, and ambulation/stair navigation      Manual Treatments:  PROM / STM / Oscillations-Mobs:  G-I, II, III, IV (PA's, Inf., Post.)  [x] (47983) Provided manual therapy to mobilize LE, proximal hip and/or LS spine soft tissue/joints for the purpose of check for anal and rectal problems.  · Then the anus is lubricated and the scope inserted.  · If you are awake, you may have a feeling similar to needing to have a bowel movement. You may also feel pressure as air is pumped into the colon. Its OK to pass gas during the procedure.  · Biopsy, polyp removal, or other treatments may be done during the test.  After the test   You may have gas right after the test. It can help to try to pass it to help prevent later bloating. Your healthcare provider may discuss the results with you right away. Or you may need to schedule a follow-up visit to talk about the results. After the test, you can go back to your normal eating and other activities. You may be tired from the sedation and need to rest for a few hours.  Date Last Reviewed: 11/1/2016 © 2000-2017 [employer]. [address] [place] [address], [place] [address]. All rights reserved. This information is not intended as a substitute for professional medical care. Always follow your healthcare professional's instructions.         improve contraction. Good tolerance to improved knee flexion. Many verbal cues for proper gait sequencing. Treatment/Activity Tolerance:  [x] Patient tolerated treatment well [] Patient limited by fatique  [] Patient limited by pain  [] Patient limited by other medical complications  [] Other:     Prognosis: [x] Good [] Fair  [] Poor    Patient Requires Follow-up: [x] Yes  [] No    PLAN: Progress patients ROM , strength and function as tolerated by patient.   Push extension stretches at home.  [] Continue per plan of care [] Alter current plan (see comments)  [x] Plan of care initiated [] Hold pending MD visit [] Discharge    Electronically signed by: Jose Huggins PTA

## 2020-01-07 NOTE — H&P
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 Authorizing Provider   amLODIPine (NORVASC) 5 MG tablet Take 1 tablet (5 mg total) by mouth once daily. 11/19/19 11/18/20  Latoya Olson MD   polyethylene glycol (GOLYTELY,NULYTELY) 236-22.74-6.74 -5.86 gram suspension Take 4,000 mLs (4 L total) by mouth once. for 1 dose 1/6/20 1/6/20  Esau Barry MD   sildenafil (VIAGRA) 100 MG tablet Take 0.5-1 tablets ( mg total) by mouth daily as needed for Erectile Dysfunction. 6/26/19 6/25/20  Latoya Olson MD   tadalafil (CIALIS) 20 MG Tab Take 1 tablet (20 mg total) by mouth every other day. Take every other day as needed 10/28/19 10/27/20  Latoya Olson MD       Sedation Problems: NO    Family History   Problem Relation Age of Onset    Cataracts Mother     Hypertension Mother     Cerebral aneurysm Father     No Known Problems Brother     No Known Problems Sister     No Known Problems Maternal Aunt     No Known Problems Maternal Uncle     No Known Problems Paternal Aunt     No Known Problems Paternal Uncle     No Known Problems Maternal Grandmother     No Known Problems Maternal Grandfather     No Known Problems Paternal Grandmother     No Known Problems Paternal Grandfather     Amblyopia Neg Hx     Blindness Neg Hx     Cancer Neg Hx     Diabetes Neg Hx     Glaucoma Neg Hx     Macular degeneration Neg Hx     Retinal detachment Neg Hx     Strabismus Neg Hx     Stroke Neg Hx     Thyroid disease Neg Hx        Fam Hx of Sedation Problems: NO    Social History     Socioeconomic History    Marital status:      Spouse name: Not on  file    Number of children: Not on file    Years of education: Not on file    Highest education level: Not on file   Occupational History    Occupation: slot and      Employer: [employer]   Social Needs    Financial resource strain: Not on file    Food insecurity:     Worry: Not on file     Inability: Not on file    Transportation needs:     Medical: Not on file     Non-medical: Not on file   Tobacco Use    Smoking status: Never Smoker    Smokeless tobacco: Never Used   Substance and Sexual Activity    Alcohol use: Yes     Frequency: Monthly or less     Drinks per session: 1 or 2     Comment: prevously socially, stopped for Lent 2019 and now rarely    Drug use: No    Sexual activity: Yes     Partners: Female   Lifestyle    Physical activity:     Days per week: Not on file     Minutes per session: Not on file    Stress: Not on file   Relationships    Social connections:     Talks on phone: Not on file     Gets together: Not on file     Attends Denominational service: Not on file     Active member of club or organization: Not on file     Attends meetings of clubs or organizations: Not on file     Relationship status: Not on file   Other Topics Concern    Not on file   Social History Narrative    3 children, daughter is nurse, both sons in nursing school       Review of Systems -     Respiratory ROS: no cough, shortness of breath, or wheezing  Cardiovascular ROS: no chest pain or dyspnea on exertion  Gastrointestinal ROS: no abdominal pain, change in bowel habits, or black or bloody stools  Musculoskeletal ROS: negative  Neurological ROS: no TIA or stroke symptoms        Physical Exam:  General: no distress  Head: normocephalic  Airway:  normal oropharynx, airway normal  Neck: supple, symmetrical, trachea midline  Lungs:  normal respiratory effort  Heart: regular rate and rhythm  Abdomen: soft, non-tender non-distented; bowel sounds normal; no masses,  no  organomegaly  Extremities: no cyanosis or edema, or clubbing       Deep Sedation: Mallampati Score per anesthesia     SedationPlan :Moderate     ASA : II    Patient is medically cleared for anesthesia.    Anesthesia/Surgery risks, benefits and alternative options discussed and understood by patient/family.

## 2020-01-07 NOTE — TRANSFER OF CARE
"Anesthesia Transfer of Care Note    Patient: Francis Weinberg    Procedure(s) Performed: Procedure(s) (LRB):  COLONOSCOPY (N/A)    Patient location: GI    Anesthesia Type: general    Transport from OR: Transported from OR on room air with adequate spontaneous ventilation    Post pain: adequate analgesia    Post assessment: no apparent anesthetic complications and tolerated procedure well    Post vital signs: stable    Level of consciousness: sedated    Nausea/Vomiting: no nausea/vomiting    Complications: none    Transfer of care protocol was followed      Last vitals:   Visit Vitals  /63 (BP Location: Left arm, Patient Position: Lying)   Pulse 81   Temp 36.3 °C (97.3 °F) (Temporal)   Resp 16   Ht 6' 1" (1.854 m)   Wt 90.7 kg (200 lb)   SpO2 97%   BMI 26.39 kg/m²     "

## 2020-01-07 NOTE — [employer] PATIENT INSTRUCTIONS
Discharge Summary/Instructions after an Endoscopic Procedure  Patient Name: Francis Weinberg  Patient MRN: [id number]  Patient YOB: 1955 Tuesday, January 07, 2020  Esau Barry MD  RESTRICTIONS:  During your procedure today, you received medications for sedation.  These   medications may affect your judgment, balance and coordination.  Therefore,   for 24 hours, you have the following restrictions:   - DO NOT drive a car, operate machinery, make legal/financial decisions,   sign important papers or drink alcohol.    ACTIVITY:  Today: no heavy lifting, straining or running due to procedural   sedation/anesthesia.  The following day: return to full activity including work.  DIET:  Eat and drink normally unless instructed otherwise.     TREATMENT FOR COMMON SIDE EFFECTS:  - Mild abdominal pain, nausea, belching, bloating or excessive gas:  rest,   eat lightly and use a heating pad.  - Sore Throat: treat with throat lozenges and/or gargle with warm salt   water.  - Because air was used during the procedure, expelling large amounts of air   from your rectum or belching is normal.  - If a bowel prep was taken, you may not have a bowel movement for 1-3 days.    This is normal.  SYMPTOMS TO WATCH FOR AND REPORT TO YOUR PHYSICIAN:  1. Abdominal pain or bloating, other than gas cramps.  2. Chest pain.  3. Back pain.  4. Signs of infection such as: chills or fever occurring within 24 hours   after the procedure.  5. Rectal bleeding, which would show as bright red, maroon, or black stools.   (A tablespoon of blood from the rectum is not serious, especially if   hemorrhoids are present.)  6. Vomiting.  7. Weakness or dizziness.  GO DIRECTLY TO THE NEAREST EMERGENCY ROOM IF YOU HAVE ANY OF THE FOLLOWING:      Difficulty breathing              Chills and/or fever over 101 F   Persistent vomiting and/or vomiting blood   Severe abdominal pain   Severe chest pain   Black, tarry stools   Bleeding- more than one tablespoon   Any  other symptom or condition that you feel may need urgent attention  Your doctor recommends these additional instructions:  If any biopsies were taken, your doctors clinic will contact you in 1 to 2   weeks with any results.  - Discharge patient to home.   - Resume previous diet.   - Continue present medications.   - Patient has a contact number available for emergencies.  The signs and   symptoms of potential delayed complications were discussed with the   patient.  Return to normal activities tomorrow.  Written discharge   instructions were provided to the patient.   - Repeat colonoscopy in 10 years for screening purposes.  For questions, problems or results please call your physician - Esau Barry MD at Work:  [phone].  [facility], EMERGENCY ROOM PHONE NUMBER: [phone]  IF A COMPLICATION OR EMERGENCY SITUATION ARISES AND YOU ARE UNABLE TO REACH   YOUR PHYSICIAN - GO DIRECTLY TO THE EMERGENCY ROOM.  Esau Barry MD  1/7/2020 11:32:57 AM  This report has been verified and signed electronically.  [employer]

## 2020-01-14 ENCOUNTER — IMMUNIZATION (OUTPATIENT)
Dept: PHARMACY | Facility: CLINIC | Age: 65
End: 2020-01-14
Payer: COMMERCIAL

## 2020-01-14 ENCOUNTER — IMMUNIZATION (OUTPATIENT)
Dept: PHARMACY | Facility: CLINIC | Age: 65
End: 2020-01-14

## 2020-01-14 ENCOUNTER — OFFICE VISIT (OUTPATIENT)
Dept: INTERNAL MEDICINE | Facility: CLINIC | Age: 65
End: 2020-01-14
Payer: COMMERCIAL

## 2020-01-14 VITALS
DIASTOLIC BLOOD PRESSURE: 82 MMHG | HEIGHT: 73 IN | BODY MASS INDEX: 27.17 KG/M2 | WEIGHT: 205 LBS | SYSTOLIC BLOOD PRESSURE: 142 MMHG

## 2020-01-14 DIAGNOSIS — I10 ESSENTIAL HYPERTENSION: Primary | ICD-10-CM

## 2020-01-14 PROCEDURE — 1159F MED LIST DOCD IN RCRD: CPT | Mod: S$GLB,,, | Performed by: INTERNAL MEDICINE

## 2020-01-14 PROCEDURE — 1159F PR MEDICATION LIST DOCUMENTED IN MEDICAL RECORD: ICD-10-PCS | Mod: S$GLB,,, | Performed by: INTERNAL MEDICINE

## 2020-01-14 PROCEDURE — 99999 PR PBB SHADOW E&M-EST. PATIENT-LVL III: CPT | Mod: PBBFAC,,, | Performed by: INTERNAL MEDICINE

## 2020-01-14 PROCEDURE — 99214 PR OFFICE/OUTPT VISIT, EST, LEVL IV, 30-39 MIN: ICD-10-PCS | Mod: S$GLB,,, | Performed by: INTERNAL MEDICINE

## 2020-01-14 PROCEDURE — 99214 OFFICE O/P EST MOD 30 MIN: CPT | Mod: S$GLB,,, | Performed by: INTERNAL MEDICINE

## 2020-01-14 PROCEDURE — 1126F PR PAIN SEVERITY QUANTIFIED, NO PAIN PRESENT: ICD-10-PCS | Mod: S$GLB,,, | Performed by: INTERNAL MEDICINE

## 2020-01-14 PROCEDURE — 99999 PR PBB SHADOW E&M-EST. PATIENT-LVL III: ICD-10-PCS | Mod: PBBFAC,,, | Performed by: INTERNAL MEDICINE

## 2020-01-14 PROCEDURE — 1126F AMNT PAIN NOTED NONE PRSNT: CPT | Mod: S$GLB,,, | Performed by: INTERNAL MEDICINE

## 2020-01-14 RX ORDER — AMLODIPINE BESYLATE 5 MG/1
5 TABLET ORAL DAILY
Qty: 90 TABLET | Refills: 3 | Status: SHIPPED | OUTPATIENT
Start: 2020-01-14 | End: 2020-04-15

## 2020-01-14 NOTE — PROGRESS NOTES
"Subjective:       Patient ID: Francis Weinberg is a 64 y.o. male.    Chief Complaint: Follow-up    HPI   64 yoM here for follow up of HTN. He checks in 30 minutes late for today's appt.   Previously encouraged to enroll in digital medicine.     Amlodipine 5mg rx 11/19/19 at nurse visit.  "Pt came in for BP check today. Bp 158/98  Pt has been checking BP at home, he bought a log.  Recent BP readings:  151/80  141/77  137/78  122/73  128/79  139/82"    He reports he started exercising and dieting and did not take the amlodipine.   He is monitoring on his fit bit which gives him a reading ofn 118/72. Side by side BP manual is 142/82.   Review of Systems   Constitutional: Negative for fever.   Respiratory: Negative for shortness of breath.    Cardiovascular: Negative for chest pain.   Musculoskeletal: Negative.    Skin: Negative.        Objective:   BP (!) 142/82   Ht 6' 1" (1.854 m)   Wt 93 kg (205 lb)   BMI 27.05 kg/m²      Physical Exam   Constitutional: He is oriented to person, place, and time. He appears well-developed and well-nourished. No distress.   HENT:   Head: Normocephalic and atraumatic.   Cardiovascular: Normal rate and regular rhythm.   No murmur heard.  Pulmonary/Chest: Effort normal. No respiratory distress. He has no wheezes. He has no rales.   Neurological: He is alert and oriented to person, place, and time.   Skin: Skin is warm and dry. He is not diaphoretic.   Psychiatric: He has a normal mood and affect. His behavior is normal.       Assessment:       1. Essential hypertension        Plan:       Francis was seen today for follow-up.    Diagnoses and all orders for this visit:    Essential hypertension  -     Hypertension Digital Medicine (HDMP) Enrollment Order  -     [facility] Assign Onboarding Questionnaires  -     Start amLODIPine (NORVASC) 5 MG tablet; Take 1 tablet (5 mg total) by mouth once daily.   Fit bit blood pressure reading is not accurate. Use home arm cuff, " keep log.

## 2020-03-22 ENCOUNTER — HOSPITAL ENCOUNTER (EMERGENCY)
Facility: HOSPITAL | Age: 65
Discharge: HOME | End: 2020-03-22
Attending: EMERGENCY MEDICINE
Payer: COMMERCIAL

## 2020-03-22 VITALS
RESPIRATION RATE: 18 BRPM | SYSTOLIC BLOOD PRESSURE: 134 MMHG | OXYGEN SATURATION: 95 % | DIASTOLIC BLOOD PRESSURE: 81 MMHG | TEMPERATURE: 97 F | HEART RATE: 76 BPM | WEIGHT: 190 LBS | BODY MASS INDEX: 25.07 KG/M2

## 2020-03-22 DIAGNOSIS — E86.0 DEHYDRATION: Primary | ICD-10-CM

## 2020-03-22 LAB
ALBUMIN SERPL BCP-MCNC: 3.3 G/DL (ref 3.5–5.2)
ALP SERPL-CCNC: 64 U/L (ref 55–135)
ALT SERPL W/O P-5'-P-CCNC: 116 U/L (ref 10–44)
ANION GAP SERPL CALC-SCNC: 11 MMOL/L (ref 8–16)
AST SERPL-CCNC: 108 U/L (ref 10–40)
BASOPHILS # BLD AUTO: 0.02 K/UL (ref 0–0.2)
BASOPHILS NFR BLD: 0.5 % (ref 0–1.9)
BILIRUB SERPL-MCNC: 0.4 MG/DL (ref 0.1–1)
BUN SERPL-MCNC: 21 MG/DL (ref 8–23)
CALCIUM SERPL-MCNC: 8.9 MG/DL (ref 8.7–10.5)
CHLORIDE SERPL-SCNC: 102 MMOL/L (ref 95–110)
CO2 SERPL-SCNC: 23 MMOL/L (ref 23–29)
CREAT SERPL-MCNC: 1.3 MG/DL (ref 0.5–1.4)
DIFFERENTIAL METHOD: ABNORMAL
EOSINOPHIL # BLD AUTO: 0 K/UL (ref 0–0.5)
EOSINOPHIL NFR BLD: 0 % (ref 0–8)
ERYTHROCYTE [DISTWIDTH] IN BLOOD BY AUTOMATED COUNT: 14.6 % (ref 11.5–14.5)
EST. GFR  (AFRICAN AMERICAN): >60 ML/MIN/1.73 M^2
EST. GFR  (NON AFRICAN AMERICAN): 57 ML/MIN/1.73 M^2
GLUCOSE SERPL-MCNC: 154 MG/DL (ref 70–110)
HCT VFR BLD AUTO: 46.9 % (ref 40–54)
HGB BLD-MCNC: 14 G/DL (ref 14–18)
IMM GRANULOCYTES # BLD AUTO: 0 K/UL (ref 0–0.04)
IMM GRANULOCYTES NFR BLD AUTO: 0 % (ref 0–0.5)
LIPASE SERPL-CCNC: 233 U/L (ref 4–60)
LYMPHOCYTES # BLD AUTO: 1.1 K/UL (ref 1–4.8)
LYMPHOCYTES NFR BLD: 28.1 % (ref 18–48)
MCH RBC QN AUTO: 23 PG (ref 27–31)
MCHC RBC AUTO-ENTMCNC: 29.9 G/DL (ref 32–36)
MCV RBC AUTO: 77 FL (ref 82–98)
MONOCYTES # BLD AUTO: 0.2 K/UL (ref 0.3–1)
MONOCYTES NFR BLD: 4.4 % (ref 4–15)
NEUTROPHILS # BLD AUTO: 2.6 K/UL (ref 1.8–7.7)
NEUTROPHILS NFR BLD: 67 % (ref 38–73)
NRBC BLD-RTO: 0 /100 WBC
PLATELET # BLD AUTO: 121 K/UL (ref 150–350)
PMV BLD AUTO: 12.1 FL (ref 9.2–12.9)
POTASSIUM SERPL-SCNC: 4.8 MMOL/L (ref 3.5–5.1)
PROT SERPL-MCNC: 7.8 G/DL (ref 6–8.4)
RBC # BLD AUTO: 6.08 M/UL (ref 4.6–6.2)
SODIUM SERPL-SCNC: 136 MMOL/L (ref 136–145)
WBC # BLD AUTO: 3.88 K/UL (ref 3.9–12.7)

## 2020-03-22 PROCEDURE — 99284 EMERGENCY DEPT VISIT MOD MDM: CPT | Mod: 25

## 2020-03-22 PROCEDURE — 36415 COLL VENOUS BLD VENIPUNCTURE: CPT

## 2020-03-22 PROCEDURE — 96361 HYDRATE IV INFUSION ADD-ON: CPT

## 2020-03-22 PROCEDURE — 83690 ASSAY OF LIPASE: CPT

## 2020-03-22 PROCEDURE — 85025 COMPLETE CBC W/AUTO DIFF WBC: CPT

## 2020-03-22 PROCEDURE — 96360 HYDRATION IV INFUSION INIT: CPT

## 2020-03-22 PROCEDURE — 63600175 PHARM REV CODE 636 W HCPCS: Performed by: EMERGENCY MEDICINE

## 2020-03-22 PROCEDURE — 80053 COMPREHEN METABOLIC PANEL: CPT

## 2020-03-22 RX ADMIN — SODIUM CHLORIDE 1000 ML: 0.9 INJECTION, SOLUTION INTRAVENOUS at 01:03

## 2020-03-22 NOTE — ED NOTES
Pt presents to the ED with c/o diarrhea. Pt states he has had diarrhea and nausea after eating seafood two days ago. Pt also c/o of decreased appetite and weight loss. Denies vomiting or abd pain. Safety maintained. Call light within reach. Aware to notify nurse of any needs or concerns. Will cont to yaritza.

## 2020-03-22 NOTE — ED NOTES
Pt resting, updated on POC. Pt verbalized understanding. No needs voiced at this time. Call light within reach. Will cont to yaritza.

## 2020-03-22 NOTE — ED PROVIDER NOTES
Encounter Date: 3/22/2020    SCRIBE #1 NOTE: I, Bridget Story, am scribing for, and in the presence of, Amish Mendiola MD.       History     Chief Complaint   Patient presents with    Anorexia     C/o decreased appetite; reports diarrhea that resolved 1-2 days PTA; no c/o pain, nausea,vomiting       Time seen by provider: 12:56 PM on 03/22/2020    Francis Weinberg is a 64 y.o. male with anemia and hypertension who presents to the ED with an onset of nausea and diarrhea. He notes that he ate a seafood platter from [employer] and was unable to finish due to onset nausea x 2 days PTA. Patient states that the had diarrhea for x 2 days that has since resolved but is still experiencing a decreased appetite. He denies any vomiting, abdominal pain, painful urination, blood in stool, or any other related symptoms at this time. He endorses being about to drink fluids but is not eating normally. He adds that he has lost ~ 10 lbs in the passed few days. PSHx includes colonoscopy.    The history is provided by the patient.     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 cannot come earlier. EC     Family History   Problem Relation Age of Onset    Cataracts Mother     Hypertension Mother     Cerebral aneurysm Father     No Known Problems Brother     No Known Problems Sister     No Known Problems Maternal Aunt     No Known Problems Maternal Uncle     No Known Problems Paternal Aunt     No Known Problems Paternal Uncle     No Known Problems Maternal Grandmother     No  Known Problems Maternal Grandfather     No Known Problems Paternal Grandmother     No Known Problems Paternal Grandfather     Amblyopia Neg Hx     Blindness Neg Hx     Cancer Neg Hx     Diabetes Neg Hx     Glaucoma Neg Hx     Macular degeneration Neg Hx     Retinal detachment Neg Hx     Strabismus Neg Hx     Stroke Neg Hx     Thyroid disease Neg Hx      Social History     Tobacco Use    Smoking status: Never Smoker    Smokeless tobacco: Never Used   Substance Use Topics    Alcohol use: Yes     Frequency: Monthly or less     Drinks per session: 1 or 2     Comment: prevously socially, stopped for Lent 2019 and now rarely    Drug use: No     Review of Systems   Constitutional: Positive for appetite change. Negative for activity change, chills, fatigue and fever.   Eyes: Negative for visual disturbance.   Respiratory: Negative for apnea and shortness of breath.    Cardiovascular: Negative for chest pain and palpitations.   Gastrointestinal: Positive for diarrhea and nausea. Negative for abdominal distention and abdominal pain.   Genitourinary: Negative for difficulty urinating.   Musculoskeletal: Negative for neck pain.   Skin: Negative for pallor and rash.   Neurological: Negative for headaches.   Hematological: Does not bruise/bleed easily.   Psychiatric/Behavioral: Negative for agitation.       Physical Exam     Initial Vitals [03/22/20 1251]   BP Pulse Resp Temp SpO2   117/76 90 18 97.2 °F (36.2 °C) 95 %      MAP       --         Physical Exam    Nursing note and vitals reviewed.  Constitutional: He appears well-developed. No distress.   HENT:   Head: Normocephalic and atraumatic.   Nose: Nose normal.   Eyes: Conjunctivae and EOM are normal. Pupils are equal, round, and reactive to light.   Neck: Normal range of motion. Neck supple. No tracheal deviation present. No JVD present.   Cardiovascular: Normal rate, regular rhythm, normal heart sounds and intact distal pulses. Exam reveals no gallop and no  friction rub.    No murmur heard.  Pulmonary/Chest: Breath sounds normal. No respiratory distress. He has no wheezes. He has no rhonchi. He has no rales.   Abdominal: Soft. Bowel sounds are normal. He exhibits no distension. There is no tenderness. There is no rigidity, no rebound and no guarding.   Musculoskeletal: Normal range of motion.   Neurological: He is alert and oriented to person, place, and time. No cranial nerve deficit.   Skin: Skin is warm and dry. Capillary refill takes less than 2 seconds. No rash noted.   Psychiatric: He has a normal mood and affect.         ED Course   Procedures  Labs Reviewed   CBC W/ AUTO DIFFERENTIAL - Abnormal; Notable for the following components:       Result Value    WBC 3.88 (*)     Mean Corpuscular Volume 77 (*)     Mean Corpuscular Hemoglobin 23.0 (*)     Mean Corpuscular Hemoglobin Conc 29.9 (*)     RDW 14.6 (*)     Platelets 121 (*)     Mono # 0.2 (*)     All other components within normal limits   COMPREHENSIVE METABOLIC PANEL - Abnormal; Notable for the following components:    Glucose 154 (*)     Albumin 3.3 (*)      (*)      (*)     eGFR if non  57 (*)     All other components within normal limits   LIPASE - Abnormal; Notable for the following components:    Lipase 233 (*)     All other components within normal limits          Imaging Results    None          Medical Decision Making:   History:   Old Medical Records: I decided to obtain old medical records.  Clinical Tests:   Lab Tests: Ordered and Reviewed  ED Management:  The cause of the patient's symptoms is not clear, but may be due to either food poisoning or viral gastrointestinal infection. However, there does not appear to be an emergent cause of the symptoms, including, but not limited to, small bowel obstruction, coronary syndrome, bowel ischemia, DKA, or pancreatitis.     Patient does have a mildly elevated lipase and LFTs however he has no abdominal pain or abdominal  complaints.  He reports that his diarrhea stopped yesterday.  After discussion with patient he does report he drinks alcohol frequently especially on weekends and he has been drinking this weekend.  I recommended cessation of alcohol use and close follow-up with PCP for recheck of LFTs and lipase.     After treatment, the patient is feeling much better, tolerating PO fluids, and shows no signs of dehydration. Suspect likely transient course of illness.  Patient states that he is currently asymptomatic is no nausea vomiting or any other complaints right now.    Plan discharge home with prompt primary care physician follow up in the next 48 hours.  Patient understands and agrees with strict return precautions and discharge instructions.  Patient plans to follow up closely with PCP.              Scribe Attestation:   Scribe #1: I performed the above scribed service and the documentation accurately describes the services I performed. I attest to the accuracy of the note.      Attending Attestation:     Physician Attestation for Scribe:    I, Dr. Amish Mendiola, personally performed the services described in this documentation.   All medical record entries made by the scribe were at my direction and in my presence.   I have reviewed the chart and agree that the record is accurate and complete.   Amish Mendiola MD  3:25 PM 03/22/2020     DISCLAIMER: This note was prepared with [employer] voice recognition transcription software. Garbled syntax, mangled pronouns, and other bizarre constructions may be attributed to that software system.                    Clinical Impression:       ICD-10-CM ICD-9-CM   1. Dehydration E86.0 276.51         Disposition:   Disposition: Discharged  Condition: Stable     ED Disposition Condition    Discharge Stable        ED Prescriptions     None        Follow-up Information     Follow up With Specialties Details Why Contact Info    Latoya Olson MD Internal Medicine Go  in 1 day  [address]  [facility] [address]  [phone]      [facility] Emergency Medicine Go to  As needed, If symptoms worsen [address]  [facility] [address] [phone]                                     Amish Mendiola MD  03/22/20 8015

## 2020-03-22 NOTE — ED NOTES
Pt resting comfortably. Warm blanket given for comfort. No needs voiced at this time. IV fluids infusing, pt mark well. Call light within reach.

## 2020-04-14 ENCOUNTER — OFFICE VISIT (OUTPATIENT)
Dept: INTERNAL MEDICINE | Facility: CLINIC | Age: 65
End: 2020-04-14
Payer: COMMERCIAL

## 2020-04-14 ENCOUNTER — TELEPHONE (OUTPATIENT)
Dept: INTERNAL MEDICINE | Facility: CLINIC | Age: 65
End: 2020-04-14

## 2020-04-14 DIAGNOSIS — Z53.21 PATIENT LEFT BEFORE EVALUATION BY PHYSICIAN: Primary | ICD-10-CM

## 2020-04-14 PROCEDURE — 99499 NO LOS: ICD-10-PCS | Mod: 95,,, | Performed by: INTERNAL MEDICINE

## 2020-04-14 PROCEDURE — 99499 UNLISTED E&M SERVICE: CPT | Mod: 95,,, | Performed by: INTERNAL MEDICINE

## 2020-04-14 NOTE — TELEPHONE ENCOUNTER
System is having technical difficulties. Can you call pt and see if he wants to reschedule to later in the day?

## 2020-04-14 NOTE — PROGRESS NOTES
Subjective:       Patient ID: Francis Weinberg is a 64 y.o. male.    Chief Complaint: HTN    HPI   Francis Weinberg is a 64 y.o. male presenting via video visit for evaluation/follow up of the following issues. This visit occurs during the COVID 19 outbreak.     The patient location is: patient's home  The chief complaint leading to consultation is: HTN  Visit type: Virtual visit with synchronous audio and video  Total time spent with patient: ***  Each patient to whom he or she provides medical services by telemedicine is:  (1) informed of the relationship between the physician and patient and the respective role of any other health care provider with respect to management of the patient; and (2) notified that he or she may decline to receive medical services by telemedicine and may withdraw from such care at any time.    ED visit on 3/22 for dehydration associated with n/v/d. Lipase 233.     Amlodipine 5mg. Was unable to enroll in digital medicine.       Review of Systems    Objective:   There were no vitals taken for this visit.     Physical Exam    Assessment:       No diagnosis found.    Plan:       There are no diagnoses linked to this encounter.

## 2020-04-14 NOTE — TELEPHONE ENCOUNTER
He was able to log in but logged back out before I could get logged in. Can you call and see if he is either able to log back in or wants to reschedule to this afternoon?

## 2020-04-15 ENCOUNTER — OFFICE VISIT (OUTPATIENT)
Dept: INTERNAL MEDICINE | Facility: CLINIC | Age: 65
End: 2020-04-15
Payer: COMMERCIAL

## 2020-04-15 VITALS — DIASTOLIC BLOOD PRESSURE: 79 MMHG | SYSTOLIC BLOOD PRESSURE: 133 MMHG

## 2020-04-15 DIAGNOSIS — I10 ESSENTIAL HYPERTENSION: Primary | ICD-10-CM

## 2020-04-15 DIAGNOSIS — N52.9 ERECTILE DYSFUNCTION, UNSPECIFIED ERECTILE DYSFUNCTION TYPE: ICD-10-CM

## 2020-04-15 PROCEDURE — 99212 OFFICE O/P EST SF 10 MIN: CPT | Mod: 95,,, | Performed by: INTERNAL MEDICINE

## 2020-04-15 PROCEDURE — 99212 PR OFFICE/OUTPT VISIT, EST, LEVL II, 10-19 MIN: ICD-10-PCS | Mod: 95,,, | Performed by: INTERNAL MEDICINE

## 2020-04-15 RX ORDER — TADALAFIL 20 MG/1
20 TABLET ORAL EVERY OTHER DAY
Qty: 6 TABLET | Refills: 11 | Status: SHIPPED | OUTPATIENT
Start: 2020-04-15 | End: 2020-09-24

## 2020-04-15 NOTE — PROGRESS NOTES
Patient disconnected from video chat before provider signed in and was unable to reconnect. He rescheduled and was see on 4/15/2020.

## 2020-04-15 NOTE — PROGRESS NOTES
Subjective:       Patient ID: Francis Weinberg is a 64 y.o. male.    Chief Complaint: bp check, ED    HPI   Francis Weinberg is a 64 y.o. male presenting via video visit for evaluation/follow up of the following issues. This visit occurs during the COVID 19 outbreak.     The patient location is: patient's home  The chief complaint leading to consultation is: bp check, ED  Visit type: Virtual visit with synchronous audio and video  Total time spent with patient: 11 minutes  Each patient to whom he or she provides medical services by telemedicine is:  (1) informed of the relationship between the physician and patient and the respective role of any other health care provider with respect to management of the patient; and (2) notified that he or she may decline to receive medical services by telemedicine and may withdraw from such care at any time.    Francis Weinberg is a 64 y.o. male presenting via video visit for evaluation/follow up of the following issues. This visit occurs during the COVID 19 outbreak.     ED visit on 3/22 for dehydration associated with n/v/d. Lipase 233. Possible food poisoning. He didn't have appetite for 3 days but is now feeling back to normal.     Amlodipine 5mg was prescribed but not taking. Never started.  Was unable to enroll in digital medicine.   Today's /79. Sometimes down to 116/70s.   Watching salt and exercising 3 times a week. Treadmill and weights.  Weight down to 180 lbs.       Review of Systems   Constitutional: Negative for fever.   Respiratory: Negative for shortness of breath.    Cardiovascular: Negative for chest pain.   Musculoskeletal: Negative.    Skin: Negative.        Objective:   /79       /87, 133/85, 139/79    Physical Exam   Constitutional: He is oriented to person, place, and time. He appears well-developed and well-nourished. No distress.   HENT:   Head: Atraumatic.   Pulmonary/Chest: Effort normal. No respiratory distress.   Neurological: He is alert and  oriented to person, place, and time.   Skin: He is not diaphoretic.   Psychiatric: He has a normal mood and affect. His behavior is normal.       Assessment:       1. Essential hypertension    2. Erectile dysfunction, unspecified erectile dysfunction type        Plan:       Diagnoses and all orders for this visit:    Essential hypertension  Controlled with low salt diet and exercise  Continue to monitor    Erectile dysfunction, unspecified erectile dysfunction type  -     tadalafiL (CIALIS) 20 MG Tab; Take 1 tablet (20 mg total) by mouth every other day. Take every other day as needed    rtc 1 year or PRN

## 2020-09-24 ENCOUNTER — HOSPITAL ENCOUNTER (OUTPATIENT)
Dept: RADIOLOGY | Facility: HOSPITAL | Age: 65
Discharge: HOME | End: 2020-09-24
Attending: OTOLARYNGOLOGY
Payer: COMMERCIAL

## 2020-09-24 ENCOUNTER — PATIENT OUTREACH (OUTPATIENT)
Dept: ADMINISTRATIVE | Facility: OTHER | Age: 65
End: 2020-09-24

## 2020-09-24 ENCOUNTER — TELEPHONE (OUTPATIENT)
Dept: OTOLARYNGOLOGY | Facility: CLINIC | Age: 65
End: 2020-09-24

## 2020-09-24 ENCOUNTER — OFFICE VISIT (OUTPATIENT)
Dept: OTOLARYNGOLOGY | Facility: CLINIC | Age: 65
End: 2020-09-24
Payer: COMMERCIAL

## 2020-09-24 ENCOUNTER — OFFICE VISIT (OUTPATIENT)
Dept: INTERNAL MEDICINE | Facility: CLINIC | Age: 65
End: 2020-09-24
Payer: COMMERCIAL

## 2020-09-24 VITALS
SYSTOLIC BLOOD PRESSURE: 130 MMHG | OXYGEN SATURATION: 98 % | BODY MASS INDEX: 26.74 KG/M2 | HEART RATE: 87 BPM | HEIGHT: 73 IN | WEIGHT: 201.75 LBS | DIASTOLIC BLOOD PRESSURE: 80 MMHG

## 2020-09-24 VITALS
BODY MASS INDEX: 26.66 KG/M2 | WEIGHT: 201.19 LBS | DIASTOLIC BLOOD PRESSURE: 93 MMHG | SYSTOLIC BLOOD PRESSURE: 133 MMHG | TEMPERATURE: 98 F | HEIGHT: 73 IN | HEART RATE: 83 BPM

## 2020-09-24 DIAGNOSIS — K11.8 SUBMANDIBULAR GLAND MASS: Primary | ICD-10-CM

## 2020-09-24 DIAGNOSIS — L02.01 SUBMENTAL ABSCESS: Primary | ICD-10-CM

## 2020-09-24 DIAGNOSIS — N52.9 ERECTILE DYSFUNCTION, UNSPECIFIED ERECTILE DYSFUNCTION TYPE: ICD-10-CM

## 2020-09-24 DIAGNOSIS — R22.1 LOCALIZED SWELLING, MASS OR LUMP OF NECK: ICD-10-CM

## 2020-09-24 PROCEDURE — 70491 CT SOFT TISSUE NECK WITH CONTRAST: ICD-10-PCS | Mod: 26,,, | Performed by: RADIOLOGY

## 2020-09-24 PROCEDURE — 25500020 PHARM REV CODE 255: Performed by: OTOLARYNGOLOGY

## 2020-09-24 PROCEDURE — 10060 PR DRAIN SKIN ABSCESS SIMPLE: ICD-10-PCS | Mod: S$GLB,,, | Performed by: OTOLARYNGOLOGY

## 2020-09-24 PROCEDURE — 87077 CULTURE AEROBIC IDENTIFY: CPT

## 2020-09-24 PROCEDURE — 87186 SC STD MICRODIL/AGAR DIL: CPT

## 2020-09-24 PROCEDURE — 99999 PR PBB SHADOW E&M-EST. PATIENT-LVL IV: ICD-10-PCS | Mod: PBBFAC,,, | Performed by: INTERNAL MEDICINE

## 2020-09-24 PROCEDURE — 99999 PR PBB SHADOW E&M-EST. PATIENT-LVL IV: CPT | Mod: PBBFAC,,, | Performed by: OTOLARYNGOLOGY

## 2020-09-24 PROCEDURE — 99999 PR PBB SHADOW E&M-EST. PATIENT-LVL IV: ICD-10-PCS | Mod: PBBFAC,,, | Performed by: OTOLARYNGOLOGY

## 2020-09-24 PROCEDURE — 99213 OFFICE O/P EST LOW 20 MIN: CPT | Mod: S$GLB,,, | Performed by: INTERNAL MEDICINE

## 2020-09-24 PROCEDURE — 10060 I&D ABSCESS SIMPLE/SINGLE: CPT | Mod: S$GLB,,, | Performed by: OTOLARYNGOLOGY

## 2020-09-24 PROCEDURE — 87070 CULTURE OTHR SPECIMN AEROBIC: CPT

## 2020-09-24 PROCEDURE — 99204 PR OFFICE/OUTPT VISIT, NEW, LEVL IV, 45-59 MIN: ICD-10-PCS | Mod: 25,S$GLB,, | Performed by: OTOLARYNGOLOGY

## 2020-09-24 PROCEDURE — 70491 CT SOFT TISSUE NECK W/DYE: CPT | Mod: TC

## 2020-09-24 PROCEDURE — 70491 CT SOFT TISSUE NECK W/DYE: CPT | Mod: 26,,, | Performed by: RADIOLOGY

## 2020-09-24 PROCEDURE — 99213 PR OFFICE/OUTPT VISIT, EST, LEVL III, 20-29 MIN: ICD-10-PCS | Mod: S$GLB,,, | Performed by: INTERNAL MEDICINE

## 2020-09-24 PROCEDURE — 99204 OFFICE O/P NEW MOD 45 MIN: CPT | Mod: 25,S$GLB,, | Performed by: OTOLARYNGOLOGY

## 2020-09-24 PROCEDURE — 99999 PR PBB SHADOW E&M-EST. PATIENT-LVL IV: CPT | Mod: PBBFAC,,, | Performed by: INTERNAL MEDICINE

## 2020-09-24 RX ORDER — SULFAMETHOXAZOLE AND TRIMETHOPRIM 800; 160 MG/1; MG/1
1 TABLET ORAL
COMMUNITY
Start: 2020-09-21 | End: 2020-09-25 | Stop reason: SDUPTHER

## 2020-09-24 RX ORDER — TADALAFIL 20 MG/1
20 TABLET ORAL EVERY OTHER DAY
Qty: 5 TABLET | Refills: 9 | Status: SHIPPED | OUTPATIENT
Start: 2020-09-24 | End: 2021-11-17

## 2020-09-24 RX ADMIN — IOHEXOL 75 ML: 350 INJECTION, SOLUTION INTRAVENOUS at 11:09

## 2020-09-24 NOTE — PROGRESS NOTES
Subjective:       Patient ID: Francis Weinberg is a 65 y.o. male.    Chief Complaint:   Mass (on chin)    HPI - Mr. Weinberg has noted an enlarging submandibular mass for the past week.  He was seen in the [facility] health at [facility] and given septra.  The mass has continued to grow.  It's tender, warm.  Never had this before.  No significant PMH.  Not a smoker.  No f/c.  No weight loss.  No dental complaints.  Requested cialis refill.    PMH/Meds:  Reviewed and reconciled in EPIC with patient during visit today.    Review of Systems   Constitutional: Negative for fever.   HENT: Negative for congestion.    Respiratory: Negative for cough.    Cardiovascular: Negative for chest pain.   Gastrointestinal: Negative for abdominal pain.   Genitourinary: Negative for difficulty urinating.   Musculoskeletal: Negative for arthralgias.   Skin: Negative for rash.   Neurological: Negative for headaches.   Psychiatric/Behavioral: Negative for sleep disturbance.       Objective:      Physical Exam  Vitals signs reviewed.   Constitutional:       General: He is not in acute distress.     Appearance: Normal appearance. He is not ill-appearing.   Neck:      Comments: 10 cm warm, mobile mass underneath chin.  Mildly ttp.    Skin:     General: Skin is warm and dry.   Neurological:      General: No focal deficit present.      Mental Status: He is alert.   Psychiatric:         Mood and Affect: Mood normal.         Assessment:       1. Submandibular gland mass    2. Erectile dysfunction, unspecified erectile dysfunction type        Plan:       Francis was seen today for mass.    Diagnoses and all orders for this visit:    Submandibular gland mass - favor abscess vs cyst.  Could be very large adenopathy, but there's none elsewhere.  Other cyst possible.  ENT for further evaluation.  Finish the course of antibiotics.  Sour candy to increase secretions  -     Ambulatory referral/consult to ENT; Future    Erectile dysfunction, unspecified erectile  dysfunction type  -     tadalafiL (CIALIS) 20 MG Tab; Take 1 tablet (20 mg total) by mouth every other day. Take every other day as needed    rtc prn    SARAH Rivera MD MPH  Staff Internist

## 2020-09-24 NOTE — LETTER
September 24, 2020      Perez Rivera II, MD  [address]  [facility] [address]           [place] [facility] Otorhinolaryngology  [address] [place] [address]  Phone: [phone]  Fax: [phone]          Patient: Francis Weinberg   MR Number: [id number]   YOB: 1955   Date of Visit: 9/24/2020       Dear Dr. Perez Rivera II:    Thank you for referring Francis Weinberg to me for evaluation. Attached you will find relevant portions of my assessment and plan of care.    If you have questions, please do not hesitate to call me. I look forward to following Francis Weinberg along with you.    Sincerely,    Kiana Magaña MD    Enclosure  CC:  No Recipients    If you would like to receive this communication electronically, please contact [email or web address] or [phone] to request more information on [employer] access.    For providers and/or their staff who would like to refer a patient to [facility], please contact us through our one-stop-shop provider referral line, [facility] [place], at [phone].    If you feel you have received this communication in error or would no longer like to receive these types of communications, please e-mail [email or web address]

## 2020-09-24 NOTE — PROGRESS NOTES
Otolaryngology Clinic Note    Francis Weinberg  Encounter Date: 9/24/2020   YOB: 1955  Referring Physician: Perez Rivera Ii, Md  [address] [place] [address]   PCP: Latoya Olson MD    Chief Complaint:   Chief Complaint   Patient presents with    Mass     neck mass, swelling started 1 wk ago. pain that comes and goes       HPI: Francis Weinberg is a 65 y.o. male referred for concern for large submandibular abscess. Patient sent today from another clinic. No imaging done. Is on antibiotics - Bactrim. Symptoms started several days prior to 9/21. Was seen in [place] which was where he was given Bactrim. Patient with minimal complaints of pain. No fevers. Thinks this all started while shaving. No other cough, congestion, hoarseness, dysphagia, throat pain.      Review of Systems   Constitutional: Negative for chills, fever and weight loss.   HENT: Negative for congestion, ear pain, hearing loss and sinus pain.    Eyes: Negative for blurred vision and double vision.   Respiratory: Negative for cough and shortness of breath.    Cardiovascular: Negative for chest pain and palpitations.   Gastrointestinal: Negative for nausea and vomiting.   Musculoskeletal: Positive for neck pain (chin). Negative for joint pain.   Skin: Negative for rash.   Neurological: Negative for dizziness, focal weakness and headaches.   Psychiatric/Behavioral: Negative for depression. The patient is not nervous/anxious.         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  cannot come earlier. EC       Social History     Socioeconomic History    Marital status:      Spouse name: Not on file    Number of children: Not on file    Years of education: Not on file    Highest education level: Not on file   Occupational History    Occupation: slot and      Employer: [employer]   Social Needs    Financial resource strain: Not very hard    Food insecurity     Worry: Never true     Inability: Never true    Transportation needs     Medical: No     Non-medical: No   Tobacco Use    Smoking status: Never Smoker    Smokeless tobacco: Never Used   Substance and Sexual Activity    Alcohol use: Yes     Frequency: Never     Drinks per session: Patient refused     Binge frequency: Never     Comment: prevously socially, stopped for Lent 2019 and now rarely    Drug use: No    Sexual activity: Yes     Partners: Female   Lifestyle    Physical activity     Days per week: 3 days     Minutes per session: 30 min    Stress: Not at all   Relationships    Social connections     Talks on phone: More than three times a week     Gets together: Never     Attends Confucianist service: Not on file     Active member of club or organization: Yes     Attends meetings of clubs or organizations: Never     Relationship status:    Other Topics Concern    Not on file   Social History Narrative    3 children, daughter is nurse, both sons in nursing school       Family History   Problem Relation Age of Onset    Cataracts Mother     Hypertension Mother     Cerebral aneurysm Father     No Known Problems Brother     No Known Problems Sister     No Known Problems Maternal Aunt     No Known Problems Maternal Uncle     No Known Problems Paternal Aunt     No Known Problems Paternal Uncle     No Known Problems Maternal Grandmother     No Known Problems Maternal Grandfather     No Known Problems Paternal Grandmother     No Known Problems Paternal Grandfather      Amblyopia Neg Hx     Blindness Neg Hx     Cancer Neg Hx     Diabetes Neg Hx     Glaucoma Neg Hx     Macular degeneration Neg Hx     Retinal detachment Neg Hx     Strabismus Neg Hx     Stroke Neg Hx     Thyroid disease Neg Hx        Outpatient Encounter Medications as of 9/24/2020   Medication Sig Dispense Refill    sulfamethoxazole-trimethoprim 800-160mg (BACTRIM DS) 800-160 mg Tab Take 1 tablet by mouth.      tadalafiL (CIALIS) 20 MG Tab Take 1 tablet (20 mg total) by mouth every other day. Take every other day as needed (Patient not taking: Reported on 9/24/2020) 5 tablet 9    [DISCONTINUED] OPW TEST CLAIM - DO NOT FILL 20 tablets. OPW test claim. Do not fill. (Patient not taking: Reported on 9/24/2020) 30 tablet 4    [DISCONTINUED] pneumococcal 23-xavier ps (PNEUMOVAX 23) 25 mcg/0.5 mL vaccine Inject 0.5 mLs into the muscle. (Patient not taking: Reported on 9/24/2020) 0.5 mL 0    [DISCONTINUED] tadalafiL (CIALIS) 20 MG Tab Take 1 tablet (20 mg total) by mouth every other day. Take every other day as needed (Patient not taking: Reported on 9/24/2020) 6 tablet 11     No facility-administered encounter medications on file as of 9/24/2020.        Physical Exam:    Vitals:    09/24/20 0956   BP: (!) 133/93   Pulse: 83   Temp: 97.9 °F (36.6 °C)       Constitutional  General Appearance: well nourished, well-developed, alert, oriented, in no acute distress  Communication: ability, understanding, voice quality normal  Head and Face  Inspection: normocephalic, atraumatic, no scars, lesions or masses    Palpation: no stepoffs, sinus tenderness or masses  Parotid glands: no masses, stones, swelling or tenderness  Eyes  Ocular Motility / Alignment: normal alignment, motility, no proptosis, enophthalmus or nystagmus  Conjunctiva: not injected  Eyelids: no entropion or ectropion, no edema  Ears  Hearing: speech reception thresholds grossly normal  External Ears: no auricle lesions, non-tender, mobile to  palpation  Otoscopy:  Right Ear: no tympanic membrane lesions, perforations, or effusion, normal EAC  Left Ear: no tympanic membrane lesions, perforations, or effusion, normal EAC  Nose  External Nose: no lesions, tenderness, trauma or deformity  Oral Cavity / Oropharynx  Lips: upper and lower lips pink and moist  Gingiva: healthy  Oral Mucosa: moist, no mucosal lesions  Floor of Mouth: normal, no lesions  Tongue: moist, normal mobility, no lesions  Palate: soft and hard palates without lesions or ulcers  Oropharynx: tonsils and walls without erythema, exudate, lesions, fullness or obstruction  Neck  Inspection and Palpation: submental region with diffuse edema, induration, mild erythema, no warmth, tiny 1 cm area that feels mildly fluctuant but difficult to palpate due to induration  Larynx and Trachea: normal position and mobility   Thyroid: no tenderness, enlargement or nodules  Submandibular Glands: no masses or tenderness  Lymphatic:  Anterior, Posterior, Submandibular, Submental, Supraclavicular: no lymphadenopathy present  Chest / Respiratory  Chest: no stridor or retractions, normal effort and expansion  Cardiovascular:  Pulses: 2+ radial pulses, regular rhythm and rate  Auscultation: deferred  Neurological  Cranial Nerves: grossly intact  General: no focal deficits  Psychiatric  Orientation: oriented to time, place and person  Mood and Affect: no depression, anxiety or agitation  Extremities  Inspection: moves all extremities well    Procedures:    Incision and Drainage    Indications: submental abscess    Anesthesia: 1% lidocaine with epinephrine    Complications: None    Findings: about 2 cc of purulence expressed    Procedure in Detail: After written consent obtained and risks, benefits and alternatives were discussed with the patient. Site was cleaned with alcohol pad. 1% lidocaine with epinephrine injected into area. Prepped with betadine. 11 blade was used to made a 1 cm incision through the skin  where it felt most fluctuant. Immediate purulence expressed. Culture taken. Hemostat used to dissect into cavity. Site thoroughly irrigated with saline until no further purulence expressed. Iodoform packing placed. Gauze taped over packing.    Patient tolerated the procedure well. No complications.    Pertinent Data:  ? LABS:   ? AUDIO:  ? PATH:  ? CULTURE:    I personally reviewed the following pertinent data at today's visit:    Imaging:   ? XRAY:  ? Ultrasound:  ? CT Scan:  ? MRI Scan:  ? PET/CT Scan:    I personally reviewed the following images:    Miscellaneous:     [place] Sleepiness Scale-     Reflux Symptom Index (RSI) -       Summary of Outside Records:      Assessment and Plan:  Francis Weinberg is a 65 y.o. male with     Submental abscess  -     Ambulatory referral/consult to ENT  -     Aerobic culture    Localized swelling, mass or lump of neck  -     CT Soft Tissue Neck With Contrast; Future; Expected date: 09/24/2020  -     Creatinine, serum; Future; Expected date: 09/24/2020       Patient sent for STAT CT Neck to better evaluate possible abscess. CT shows small 1-1.5 submental collection just left of midline. Risks, benefits and alternatives of incision and drainage discussed with patient. Elected to proceed with in clinic drainage. Done with no complication. Continue bactrim for now. Cultures sent. May need additional script but will wait to see if cultures grow anything unusual. RTC tomorrow for packing removal.        NEVILLE Michael MD  [facility]

## 2020-09-24 NOTE — PROCEDURES
Incision and Drainage    Indications: submental abscess    Anesthesia: 1% lidocaine with epinephrine    Complications: None    Findings: about 2 cc of purulence expressed    Procedure in Detail: After written consent obtained and risks, benefits and alternatives were discussed with the patient. Site was cleaned with alcohol pad. 1% lidocaine with epinephrine injected into area. Prepped with betadine. 11 blade was used to made a 1 cm incision through the skin where it felt most fluctuant. Immediate purulence expressed. Culture taken. Hemostat used to dissect into cavity. Site thoroughly irrigated with saline until no further purulence expressed. Iodoform packing placed. Gauze taped over packing.    Patient tolerated the procedure well. No complications.

## 2020-09-24 NOTE — TELEPHONE ENCOUNTER
Attempted to call patient regarding appointment today with Dr Turpin. No answer. Left voicemail and message with patients wife to return my call

## 2020-09-25 ENCOUNTER — OFFICE VISIT (OUTPATIENT)
Dept: OTOLARYNGOLOGY | Facility: CLINIC | Age: 65
End: 2020-09-25
Payer: COMMERCIAL

## 2020-09-25 VITALS
HEART RATE: 91 BPM | DIASTOLIC BLOOD PRESSURE: 86 MMHG | BODY MASS INDEX: 26.67 KG/M2 | WEIGHT: 201.25 LBS | HEIGHT: 73 IN | SYSTOLIC BLOOD PRESSURE: 144 MMHG

## 2020-09-25 DIAGNOSIS — L02.01 SUBMENTAL ABSCESS: Primary | ICD-10-CM

## 2020-09-25 PROCEDURE — 99999 PR PBB SHADOW E&M-EST. PATIENT-LVL III: CPT | Mod: PBBFAC,,, | Performed by: OTOLARYNGOLOGY

## 2020-09-25 PROCEDURE — 10060 I&D ABSCESS SIMPLE/SINGLE: CPT | Mod: 58,S$GLB,, | Performed by: OTOLARYNGOLOGY

## 2020-09-25 PROCEDURE — 99999 PR PBB SHADOW E&M-EST. PATIENT-LVL III: ICD-10-PCS | Mod: PBBFAC,,, | Performed by: OTOLARYNGOLOGY

## 2020-09-25 PROCEDURE — 99024 PR POST-OP FOLLOW-UP VISIT: ICD-10-PCS | Mod: S$GLB,,, | Performed by: OTOLARYNGOLOGY

## 2020-09-25 PROCEDURE — 99024 POSTOP FOLLOW-UP VISIT: CPT | Mod: S$GLB,,, | Performed by: OTOLARYNGOLOGY

## 2020-09-25 PROCEDURE — 10060 PR DRAIN SKIN ABSCESS SIMPLE: ICD-10-PCS | Mod: 58,S$GLB,, | Performed by: OTOLARYNGOLOGY

## 2020-09-25 RX ORDER — SULFAMETHOXAZOLE AND TRIMETHOPRIM 800; 160 MG/1; MG/1
1 TABLET ORAL 2 TIMES DAILY
Qty: 14 TABLET | Refills: 0 | Status: SHIPPED | OUTPATIENT
Start: 2020-09-28 | End: 2020-10-05

## 2020-09-25 NOTE — PATIENT INSTRUCTIONS
LEAVE PACKING IN PLACE UNTIL TOMORROW. May change the gauze below the packing but do not remove the packing until tomorrow. Once you remove the packing tomorrow, replace it as we showed you during clinic. Do this again on Sunday. If you are unable to get the packing in, swab the area with a peroxide soaked qtip. Return to clinic Monday for re-assessment    It was a pleasure seeing you today. Please don't hesitate to contact our office if you have any questions or additional concerns after your visit. You may reach us through the patient portal or by calling [phone].    Dr. Elizabeth G. de Lauréal [facility]   [address], [address]  [place] [address]

## 2020-09-25 NOTE — PROCEDURES
Incision and Drainage     Indications: submental abscess     Anesthesia: 1% lidocaine with epinephrine     Complications: None    Indications: patient removed packing yesterday and hole has closed     Findings: some brownish bloody fluid expressed, no purulence     Procedure in Detail: After verbal consent obtained and risks, benefits and alternatives were discussed with the patient. Site was cleaned with alcohol pad. 1% lidocaine with epinephrine injected into area. Prepped with betadine. 11 blade was used to reopen incision made yesterday. Some brownish/bloody fluid was expressed. Hemostat used to widen cavity again. Site thoroughly irrigated with saline until no further purulence expressed. Iodoform packing re-placed. Demonstrated this to patient. Gauze taped over packing.     Patient tolerated the procedure well. No complications.

## 2020-09-25 NOTE — PROGRESS NOTES
Otolaryngology Clinic Note    Francis Weinberg  Encounter Date: 9/25/2020   YOB: 1955  Referring Physician: Heather Self  No address on file   PCP: Latoya Olson MD    Chief Complaint:   Chief Complaint   Patient presents with    Follow-up     feeling well       HPI: Francis Weinberg is a 65 y.o. male here for follow up of small submental abscess drained in clinic yesterday. Here today for packing removal. Patient was confused and removed the packing himself yesterday. He had not been instructed on how to replaced it and just put some over his skin. Hole seems closed. No further drainage. No fevers.    Review of Systems   Constitutional: Negative for fever.   Musculoskeletal: Positive for neck pain.        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 cannot come earlier. EC       Social History     Socioeconomic History    Marital status:      Spouse name: Not on file    Number of children: Not on file    Years of education: Not on file    Highest education level: Not on file   Occupational History    Occupation: slot and      Employer: [employer]   Social Needs    Financial resource strain: Not very hard    Food insecurity     Worry: Never true     Inability: Never true    Transportation needs     Medical: No     Non-medical: No   Tobacco Use    Smoking status: Never Smoker    Smokeless tobacco: Never Used   Substance and Sexual Activity    Alcohol use: Yes     Frequency: Never     Drinks per  session: Patient refused     Binge frequency: Never     Comment: prevously socially, stopped for Lent 2019 and now rarely    Drug use: No    Sexual activity: Yes     Partners: Female   Lifestyle    Physical activity     Days per week: 3 days     Minutes per session: 30 min    Stress: Not at all   Relationships    Social connections     Talks on phone: More than three times a week     Gets together: Never     Attends Muslim service: Not on file     Active member of club or organization: Yes     Attends meetings of clubs or organizations: Never     Relationship status:    Other Topics Concern    Not on file   Social History Narrative    3 children, daughter is nurse, both sons in nursing school       Family History   Problem Relation Age of Onset    Cataracts Mother     Hypertension Mother     Cerebral aneurysm Father     No Known Problems Brother     No Known Problems Sister     No Known Problems Maternal Aunt     No Known Problems Maternal Uncle     No Known Problems Paternal Aunt     No Known Problems Paternal Uncle     No Known Problems Maternal Grandmother     No Known Problems Maternal Grandfather     No Known Problems Paternal Grandmother     No Known Problems Paternal Grandfather     Amblyopia Neg Hx     Blindness Neg Hx     Cancer Neg Hx     Diabetes Neg Hx     Glaucoma Neg Hx     Macular degeneration Neg Hx     Retinal detachment Neg Hx     Strabismus Neg Hx     Stroke Neg Hx     Thyroid disease Neg Hx        Outpatient Encounter Medications as of 9/25/2020   Medication Sig Dispense Refill    [DISCONTINUED] sulfamethoxazole-trimethoprim 800-160mg (BACTRIM DS) 800-160 mg Tab Take 1 tablet by mouth.      [START ON 9/28/2020] sulfamethoxazole-trimethoprim 800-160mg (BACTRIM DS) 800-160 mg Tab Take 1 tablet by mouth 2 (two) times daily. for 7 days 14 tablet 0    tadalafiL (CIALIS) 20 MG Tab Take 1 tablet (20 mg total) by mouth every other day. Take every other day as  needed (Patient not taking: Reported on 9/24/2020) 5 tablet 9     No facility-administered encounter medications on file as of 9/25/2020.        Physical Exam:    Vitals:    09/25/20 0905   BP: (!) 144/86   Pulse: 91       Constitutional  General Appearance: well nourished, well-developed, alert, oriented, in no acute distress  Communication: ability, understanding, voice quality normal  Head and Face  Inspection: normocephalic, atraumatic, no scars, lesions or masses    Eyes  Ocular Motility / Alignment: normal alignment, motility, no proptosis, enophthalmus or nystagmus  Conjunctiva: not injected  Eyelids: no entropion or ectropion, no edema  Ears  Hearing: speech reception thresholds grossly normal  External Ears: no auricle lesions, non-tender, mobile to palpation    Nose  External Nose: no lesions, tenderness, trauma or deformity  Neck  Inspection and Palpation: submental area with induration, edema, mild erythema, packing removed, hole closed  Chest / Respiratory  Chest: no stridor or retractions, normal effort and expansion  Neurological  General: no focal deficits  Psychiatric  Orientation: oriented to time, place and person  Mood and Affect: no depression, anxiety or agitation    Procedures:  Incision and Drainage     Indications: submental abscess     Anesthesia: 1% lidocaine with epinephrine     Complications: None    Indications: patient removed packing yesterday and hole has closed     Findings: some brownish bloody fluid expressed, no purulence     Procedure in Detail: After verbal consent obtained and risks, benefits and alternatives were discussed with the patient. Site was cleaned with alcohol pad. 1% lidocaine with epinephrine injected into area. Prepped with betadine. 11 blade was used to reopen incision made yesterday. Some brownish/bloody fluid was expressed. Hemostat used to widen cavity again. Site thoroughly irrigated with saline until no further purulence expressed. Iodoform packing  re-placed. Demonstrated this to patient. Gauze taped over packing.     Patient tolerated the procedure well. No complications.    Pertinent Data:  ? LABS:   ? AUDIO:  ? PATH:  ? XRAY:    I personally reviewed the following pertinent data at today's visit:    Imaging:   ? Ultrasound:  ? CT Scan:  ? MRI Scan:  ? PET/CT Scan:    I personally reviewed the following images:    Miscellaneous:     [place] Sleepiness Scale-     Reflux Symptom Index (RSI) -     Summary of Outside Records:      Assessment and Plan:  Francis Weinberg is a 65 y.o. male with       Submental abscess    Other orders  -     sulfamethoxazole-trimethoprim 800-160mg (BACTRIM DS) 800-160 mg Tab; Take 1 tablet by mouth 2 (two) times daily. for 7 days  Dispense: 14 tablet; Refill: 0       Re-opened incision site today. Stressed importance of keeping packing in place. Instructed on how to change it tomorrow and Sunday. Will have him return Monday. Continue antibiotics as prescribed. Cultures pending.        E. Grier Gardner, MD [facility]

## 2020-09-26 LAB — BACTERIA SPEC AEROBE CULT: ABNORMAL

## 2020-09-28 ENCOUNTER — OFFICE VISIT (OUTPATIENT)
Dept: OTOLARYNGOLOGY | Facility: CLINIC | Age: 65
End: 2020-09-28
Payer: COMMERCIAL

## 2020-09-28 VITALS
WEIGHT: 197.75 LBS | SYSTOLIC BLOOD PRESSURE: 127 MMHG | BODY MASS INDEX: 26.21 KG/M2 | DIASTOLIC BLOOD PRESSURE: 83 MMHG | TEMPERATURE: 97 F | HEIGHT: 73 IN | HEART RATE: 80 BPM

## 2020-09-28 DIAGNOSIS — L02.01 SUBMENTAL ABSCESS: Primary | ICD-10-CM

## 2020-09-28 PROCEDURE — 99999 PR PBB SHADOW E&M-EST. PATIENT-LVL III: CPT | Mod: PBBFAC,,, | Performed by: OTOLARYNGOLOGY

## 2020-09-28 PROCEDURE — 99024 PR POST-OP FOLLOW-UP VISIT: ICD-10-PCS | Mod: S$GLB,,, | Performed by: OTOLARYNGOLOGY

## 2020-09-28 PROCEDURE — 99024 POSTOP FOLLOW-UP VISIT: CPT | Mod: S$GLB,,, | Performed by: OTOLARYNGOLOGY

## 2020-09-28 PROCEDURE — 99999 PR PBB SHADOW E&M-EST. PATIENT-LVL III: ICD-10-PCS | Mod: PBBFAC,,, | Performed by: OTOLARYNGOLOGY

## 2020-09-28 RX ORDER — MUPIROCIN 20 MG/G
OINTMENT TOPICAL 2 TIMES DAILY
Qty: 15 G | Refills: 0 | Status: SHIPPED | OUTPATIENT
Start: 2020-09-28 | End: 2021-11-17

## 2020-09-28 NOTE — PROGRESS NOTES
Otolaryngology Clinic Note    Francis Weinberg  Encounter Date: 9/28/2020   YOB: 1955  Referring Physician: Heather Self  No address on file   PCP: Latoya Olson MD    Chief Complaint:   Chief Complaint   Patient presents with    Follow-up     feeling well       HPI: Francis Weinberg is a 65 y.o. male here for follow up of small submental abscess drained in 9/24 but then required re-drainage on 9/25 because patient took packing out and hole had already closed by follow up visit. He was repacked with instructions on how to change packing Saturday and Sunday. Here today for follow up. He is feeling much better. Hole is starting to close. Minimal drainage. No pain. Swelling improved.    Cultures growing MRSA sensitive to bactrim which he is on.    Review of Systems   Constitutional: Negative for fever.   Musculoskeletal: Negative for neck pain.        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 cannot come earlier. EC       Social History     Socioeconomic History    Marital status:      Spouse name: Not on file    Number of children: Not on file    Years of education: Not on file    Highest education level: Not on file   Occupational History    Occupation: slot and      Employer: [employer]   Social Needs    Financial resource strain: Not very hard    Food insecurity     Worry: Never true     Inability: Never true    Transportation needs     Medical: No     Non-medical: No   Tobacco Use    Smoking  status: Never Smoker    Smokeless tobacco: Never Used   Substance and Sexual Activity    Alcohol use: Yes     Frequency: Never     Drinks per session: Patient refused     Binge frequency: Never     Comment: prevously socially, stopped for Lent 2019 and now rarely    Drug use: No    Sexual activity: Yes     Partners: Female   Lifestyle    Physical activity     Days per week: 3 days     Minutes per session: 30 min    Stress: Not at all   Relationships    Social connections     Talks on phone: More than three times a week     Gets together: Never     Attends Yarsani service: Not on file     Active member of club or organization: Yes     Attends meetings of clubs or organizations: Never     Relationship status:    Other Topics Concern    Not on file   Social History Narrative    3 children, daughter is nurse, both sons in nursing school       Family History   Problem Relation Age of Onset    Cataracts Mother     Hypertension Mother     Cerebral aneurysm Father     No Known Problems Brother     No Known Problems Sister     No Known Problems Maternal Aunt     No Known Problems Maternal Uncle     No Known Problems Paternal Aunt     No Known Problems Paternal Uncle     No Known Problems Maternal Grandmother     No Known Problems Maternal Grandfather     No Known Problems Paternal Grandmother     No Known Problems Paternal Grandfather     Amblyopia Neg Hx     Blindness Neg Hx     Cancer Neg Hx     Diabetes Neg Hx     Glaucoma Neg Hx     Macular degeneration Neg Hx     Retinal detachment Neg Hx     Strabismus Neg Hx     Stroke Neg Hx     Thyroid disease Neg Hx        Outpatient Encounter Medications as of 9/28/2020   Medication Sig Dispense Refill    sulfamethoxazole-trimethoprim 800-160mg (BACTRIM DS) 800-160 mg Tab Take 1 tablet by mouth 2 (two) times daily. for 7 days 14 tablet 0    tadalafiL (CIALIS) 20 MG Tab Take 1 tablet (20 mg total) by mouth every other day. Take every other  day as needed (Patient not taking: Reported on 9/24/2020) 5 tablet 9     No facility-administered encounter medications on file as of 9/28/2020.        Physical Exam:    Vitals:    09/28/20 1126   BP: 127/83   Pulse: 80   Temp: 97.3 °F (36.3 °C)       Constitutional  General Appearance: well nourished, well-developed, alert, oriented, in no acute distress  Communication: ability, understanding, voice quality normal  Head and Face  Inspection: normocephalic, atraumatic, no scars, lesions or masses    Eyes  Ocular Motility / Alignment: normal alignment, motility, no proptosis, enophthalmus or nystagmus  Conjunctiva: not injected  Eyelids: no entropion or ectropion, no edema  Ears  Hearing: speech reception thresholds grossly normal  External Ears: no auricle lesions, non-tender, mobile to palpation    Nose  External Nose: no lesions, tenderness, trauma or deformity  Neck  Inspection and Palpation: submental area with incision site still slightly open    Chest / Respiratory  Chest: no stridor or retractions, normal effort and expansion  Neurological  General: no focal deficits  Psychiatric  Orientation: oriented to time, place and person  Mood and Affect: no depression, anxiety or agitation    Procedures:  No notes on file    Pertinent Data:  ? LABS:   ? AUDIO:  ? PATH:  ? XRAY:      I personally reviewed the following pertinent data at today's visit: cultures    Imaging:   ? Ultrasound:  ? CT Scan:  ? MRI Scan:  ? PET/CT Scan:    I personally reviewed the following images:    Miscellaneous:     [place] Sleepiness Scale-     Reflux Symptom Index (RSI) -     Summary of Outside Records:      Assessment and Plan:  Francis Weinberg is a 65 y.o. male with       Submental abscess       Significant improvement s/p I&D of small submental abscess. Continue bactrim as prescribed. Clean area well. Apply bactroban ointment twice daily to area. RTC 2 weeks or sooner if any worsening or recurrence        E. Grier Gardner, MD [facility]

## 2021-03-07 ENCOUNTER — OFFICE VISIT (OUTPATIENT)
Dept: URGENT CARE | Facility: CLINIC | Age: 66
End: 2021-03-07
Payer: COMMERCIAL

## 2021-03-07 VITALS
HEIGHT: 73 IN | RESPIRATION RATE: 16 BRPM | WEIGHT: 207 LBS | DIASTOLIC BLOOD PRESSURE: 79 MMHG | TEMPERATURE: 98 F | SYSTOLIC BLOOD PRESSURE: 118 MMHG | BODY MASS INDEX: 27.43 KG/M2 | HEART RATE: 86 BPM | OXYGEN SATURATION: 98 %

## 2021-03-07 DIAGNOSIS — K52.9 GASTROENTERITIS: Primary | ICD-10-CM

## 2021-03-07 DIAGNOSIS — R11.2 NON-INTRACTABLE VOMITING WITH NAUSEA, UNSPECIFIED VOMITING TYPE: ICD-10-CM

## 2021-03-07 PROCEDURE — 99204 OFFICE O/P NEW MOD 45 MIN: CPT | Mod: S$GLB,,, | Performed by: NURSE PRACTITIONER

## 2021-03-07 PROCEDURE — 99204 PR OFFICE/OUTPT VISIT, NEW, LEVL IV, 45-59 MIN: ICD-10-PCS | Mod: S$GLB,,, | Performed by: NURSE PRACTITIONER

## 2021-03-07 RX ORDER — ONDANSETRON 4 MG/1
4 TABLET, ORALLY DISINTEGRATING ORAL EVERY 6 HOURS PRN
Qty: 12 TABLET | Refills: 0 | Status: SHIPPED | OUTPATIENT
Start: 2021-03-07 | End: 2021-03-10

## 2021-05-02 ENCOUNTER — HOSPITAL ENCOUNTER (EMERGENCY)
Facility: HOSPITAL | Age: 66
Discharge: HOME | End: 2021-05-02
Attending: EMERGENCY MEDICINE
Payer: COMMERCIAL

## 2021-05-02 VITALS
DIASTOLIC BLOOD PRESSURE: 97 MMHG | RESPIRATION RATE: 18 BRPM | BODY MASS INDEX: 27.17 KG/M2 | TEMPERATURE: 98 F | SYSTOLIC BLOOD PRESSURE: 152 MMHG | OXYGEN SATURATION: 97 % | HEIGHT: 73 IN | HEART RATE: 76 BPM | WEIGHT: 205 LBS
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
## 2021-05-02 PROCEDURE — 99281 EMR DPT VST MAYX REQ PHY/QHP: CPT

## 2021-10-04 ENCOUNTER — PATIENT MESSAGE (OUTPATIENT)
Dept: ADMINISTRATIVE | Facility: HOSPITAL | Age: 66
End: 2021-10-04

## 2021-11-16 PROBLEM — Z96.1 PSEUDOPHAKIA, LEFT EYE: Status: RESOLVED | Noted: 2017-10-17 | Resolved: 2021-11-16

## 2021-11-16 PROBLEM — H25.812 COMBINED FORMS OF AGE-RELATED CATARACT OF LEFT EYE: Status: RESOLVED | Noted: 2017-10-17 | Resolved: 2021-11-16

## 2021-11-16 PROBLEM — H25.10 SENILE NUCLEAR SCLEROSIS: Status: RESOLVED | Noted: 2018-01-04 | Resolved: 2021-11-16

## 2021-11-16 PROBLEM — Z12.11 COLON CANCER SCREENING: Status: RESOLVED | Noted: 2020-01-07 | Resolved: 2021-11-16

## 2021-11-17 ENCOUNTER — IMMUNIZATION (OUTPATIENT)
Dept: INTERNAL MEDICINE | Facility: CLINIC | Age: 66
End: 2021-11-17
Payer: COMMERCIAL

## 2021-11-17 ENCOUNTER — OFFICE VISIT (OUTPATIENT)
Dept: INTERNAL MEDICINE | Facility: CLINIC | Age: 66
End: 2021-11-17
Payer: COMMERCIAL

## 2021-11-17 ENCOUNTER — HOSPITAL ENCOUNTER (OUTPATIENT)
Dept: RADIOLOGY | Facility: HOSPITAL | Age: 66
Discharge: HOME | End: 2021-11-17
Attending: NURSE PRACTITIONER
Payer: COMMERCIAL

## 2021-11-17 ENCOUNTER — OFFICE VISIT (OUTPATIENT)
Dept: UROLOGY | Facility: CLINIC | Age: 66
End: 2021-11-17
Payer: COMMERCIAL

## 2021-11-17 VITALS
BODY MASS INDEX: 28.05 KG/M2 | HEIGHT: 73 IN | WEIGHT: 211.63 LBS | DIASTOLIC BLOOD PRESSURE: 103 MMHG | HEART RATE: 89 BPM | SYSTOLIC BLOOD PRESSURE: 156 MMHG

## 2021-11-17 VITALS
HEART RATE: 81 BPM | BODY MASS INDEX: 28.02 KG/M2 | WEIGHT: 211.44 LBS | SYSTOLIC BLOOD PRESSURE: 130 MMHG | HEIGHT: 73 IN | DIASTOLIC BLOOD PRESSURE: 92 MMHG | OXYGEN SATURATION: 97 %

## 2021-11-17 DIAGNOSIS — Z00.00 PREVENTATIVE HEALTH CARE: Primary | ICD-10-CM

## 2021-11-17 DIAGNOSIS — L81.9 HYPERPIGMENTATION: ICD-10-CM

## 2021-11-17 DIAGNOSIS — R53.83 FATIGUE, UNSPECIFIED TYPE: Primary | ICD-10-CM

## 2021-11-17 DIAGNOSIS — Z86.79 HISTORY OF ESSENTIAL HYPERTENSION: ICD-10-CM

## 2021-11-17 DIAGNOSIS — I83.92 VARICOSE VEINS OF LEFT LOWER EXTREMITY, UNSPECIFIED WHETHER COMPLICATED: ICD-10-CM

## 2021-11-17 DIAGNOSIS — Z00.00 PREVENTATIVE HEALTH CARE: ICD-10-CM

## 2021-11-17 DIAGNOSIS — R39.9 LOWER URINARY TRACT SYMPTOMS (LUTS): ICD-10-CM

## 2021-11-17 DIAGNOSIS — M79.672 LEFT FOOT PAIN: ICD-10-CM

## 2021-11-17 DIAGNOSIS — N52.9 ED (ERECTILE DYSFUNCTION) OF ORGANIC ORIGIN: ICD-10-CM

## 2021-11-17 PROCEDURE — 90694 VACC AIIV4 NO PRSRV 0.5ML IM: CPT | Mod: S$GLB,,, | Performed by: INTERNAL MEDICINE

## 2021-11-17 PROCEDURE — 99397 PR PREVENTIVE VISIT,EST,65 & OVER: ICD-10-PCS | Mod: S$GLB,,, | Performed by: NURSE PRACTITIONER

## 2021-11-17 PROCEDURE — 99999 PR PBB SHADOW E&M-EST. PATIENT-LVL III: ICD-10-PCS | Mod: PBBFAC,,, | Performed by: UROLOGY

## 2021-11-17 PROCEDURE — 90471 IMMUNIZATION ADMIN: CPT | Mod: S$GLB,,, | Performed by: INTERNAL MEDICINE

## 2021-11-17 PROCEDURE — 90471 FLU VACCINE - QUADRIVALENT - ADJUVANTED: ICD-10-PCS | Mod: S$GLB,,, | Performed by: INTERNAL MEDICINE

## 2021-11-17 PROCEDURE — 99999 PR PBB SHADOW E&M-EST. PATIENT-LVL III: CPT | Mod: PBBFAC,,, | Performed by: UROLOGY

## 2021-11-17 PROCEDURE — 99999 PR PBB SHADOW E&M-EST. PATIENT-LVL IV: ICD-10-PCS | Mod: PBBFAC,,, | Performed by: NURSE PRACTITIONER

## 2021-11-17 PROCEDURE — 73630 X-RAY EXAM OF FOOT: CPT | Mod: 26,LT,, | Performed by: RADIOLOGY

## 2021-11-17 PROCEDURE — 90694 FLU VACCINE - QUADRIVALENT - ADJUVANTED: ICD-10-PCS | Mod: S$GLB,,, | Performed by: INTERNAL MEDICINE

## 2021-11-17 PROCEDURE — 73630 X-RAY EXAM OF FOOT: CPT | Mod: TC,LT

## 2021-11-17 PROCEDURE — 99397 PER PM REEVAL EST PAT 65+ YR: CPT | Mod: S$GLB,,, | Performed by: NURSE PRACTITIONER

## 2021-11-17 PROCEDURE — 99999 PR PBB SHADOW E&M-EST. PATIENT-LVL IV: CPT | Mod: PBBFAC,,, | Performed by: NURSE PRACTITIONER

## 2021-11-17 PROCEDURE — 73630 XR FOOT COMPLETE 3 VIEW LEFT: ICD-10-PCS | Mod: 26,LT,, | Performed by: RADIOLOGY

## 2021-11-17 PROCEDURE — 99204 PR OFFICE/OUTPT VISIT, NEW, LEVL IV, 45-59 MIN: ICD-10-PCS | Mod: S$GLB,,, | Performed by: UROLOGY

## 2021-11-17 PROCEDURE — 99204 OFFICE O/P NEW MOD 45 MIN: CPT | Mod: S$GLB,,, | Performed by: UROLOGY

## 2021-11-18 ENCOUNTER — TELEPHONE (OUTPATIENT)
Dept: INTERNAL MEDICINE | Facility: CLINIC | Age: 66
End: 2021-11-18
Payer: COMMERCIAL

## 2021-11-19 ENCOUNTER — TELEPHONE (OUTPATIENT)
Dept: INTERNAL MEDICINE | Facility: CLINIC | Age: 66
End: 2021-11-19
Payer: COMMERCIAL

## 2021-11-19 DIAGNOSIS — M79.673 PAIN OF FOOT, UNSPECIFIED LATERALITY: Primary | ICD-10-CM

## 2021-12-08 ENCOUNTER — OFFICE VISIT (OUTPATIENT)
Dept: UROLOGY | Facility: CLINIC | Age: 66
End: 2021-12-08
Payer: COMMERCIAL

## 2021-12-08 VITALS
HEIGHT: 73 IN | DIASTOLIC BLOOD PRESSURE: 100 MMHG | SYSTOLIC BLOOD PRESSURE: 163 MMHG | HEART RATE: 75 BPM | BODY MASS INDEX: 28.25 KG/M2 | WEIGHT: 213.19 LBS

## 2021-12-08 DIAGNOSIS — N52.01 ERECTILE DYSFUNCTION DUE TO ARTERIAL INSUFFICIENCY: ICD-10-CM

## 2021-12-08 DIAGNOSIS — N40.1 BPH WITH URINARY OBSTRUCTION: Primary | ICD-10-CM

## 2021-12-08 DIAGNOSIS — I10 ESSENTIAL HYPERTENSION: ICD-10-CM

## 2021-12-08 DIAGNOSIS — N13.8 BPH WITH URINARY OBSTRUCTION: Primary | ICD-10-CM

## 2021-12-08 PROCEDURE — 99214 PR OFFICE/OUTPT VISIT, EST, LEVL IV, 30-39 MIN: ICD-10-PCS | Mod: S$GLB,,, | Performed by: UROLOGY

## 2021-12-08 PROCEDURE — 99214 OFFICE O/P EST MOD 30 MIN: CPT | Mod: S$GLB,,, | Performed by: UROLOGY

## 2021-12-08 PROCEDURE — 99999 PR PBB SHADOW E&M-EST. PATIENT-LVL IV: CPT | Mod: PBBFAC,,, | Performed by: UROLOGY

## 2021-12-08 PROCEDURE — 99999 PR PBB SHADOW E&M-EST. PATIENT-LVL IV: ICD-10-PCS | Mod: PBBFAC,,, | Performed by: UROLOGY

## 2021-12-08 RX ORDER — PAPAVERINE HYDROCHLORIDE 30 MG/ML
INJECTION INTRAMUSCULAR; INTRAVENOUS
Qty: 5 ML | Refills: 0 | Status: SHIPPED | OUTPATIENT
Start: 2021-12-08 | End: 2023-04-19

## 2022-02-11 ENCOUNTER — TELEPHONE (OUTPATIENT)
Dept: DIABETES | Facility: CLINIC | Age: 67
End: 2022-02-11
Payer: COMMERCIAL

## 2022-02-11 ENCOUNTER — OFFICE VISIT (OUTPATIENT)
Dept: INTERNAL MEDICINE | Facility: CLINIC | Age: 67
End: 2022-02-11
Payer: COMMERCIAL

## 2022-02-11 VITALS
SYSTOLIC BLOOD PRESSURE: 118 MMHG | BODY MASS INDEX: 28.2 KG/M2 | OXYGEN SATURATION: 97 % | HEART RATE: 69 BPM | DIASTOLIC BLOOD PRESSURE: 72 MMHG | WEIGHT: 212.75 LBS | HEIGHT: 73 IN

## 2022-02-11 DIAGNOSIS — E11.9 NEW ONSET TYPE 2 DIABETES MELLITUS: Primary | ICD-10-CM

## 2022-02-11 DIAGNOSIS — E55.9 VITAMIN D INSUFFICIENCY: ICD-10-CM

## 2022-02-11 PROCEDURE — 1160F PR REVIEW ALL MEDS BY PRESCRIBER/CLIN PHARMACIST DOCUMENTED: ICD-10-PCS | Mod: CPTII,S$GLB,, | Performed by: INTERNAL MEDICINE

## 2022-02-11 PROCEDURE — 1159F MED LIST DOCD IN RCRD: CPT | Mod: CPTII,S$GLB,, | Performed by: INTERNAL MEDICINE

## 2022-02-11 PROCEDURE — 1125F AMNT PAIN NOTED PAIN PRSNT: CPT | Mod: CPTII,S$GLB,, | Performed by: INTERNAL MEDICINE

## 2022-02-11 PROCEDURE — 3008F PR BODY MASS INDEX (BMI) DOCUMENTED: ICD-10-PCS | Mod: CPTII,S$GLB,, | Performed by: INTERNAL MEDICINE

## 2022-02-11 PROCEDURE — 1160F RVW MEDS BY RX/DR IN RCRD: CPT | Mod: CPTII,S$GLB,, | Performed by: INTERNAL MEDICINE

## 2022-02-11 PROCEDURE — 82570 ASSAY OF URINE CREATININE: CPT | Performed by: INTERNAL MEDICINE

## 2022-02-11 PROCEDURE — 99214 OFFICE O/P EST MOD 30 MIN: CPT | Mod: S$GLB,,, | Performed by: INTERNAL MEDICINE

## 2022-02-11 PROCEDURE — 3288F FALL RISK ASSESSMENT DOCD: CPT | Mod: CPTII,S$GLB,, | Performed by: INTERNAL MEDICINE

## 2022-02-11 PROCEDURE — 1101F PR PT FALLS ASSESS DOC 0-1 FALLS W/OUT INJ PAST YR: ICD-10-PCS | Mod: CPTII,S$GLB,, | Performed by: INTERNAL MEDICINE

## 2022-02-11 PROCEDURE — 3078F PR MOST RECENT DIASTOLIC BLOOD PRESSURE < 80 MM HG: ICD-10-PCS | Mod: CPTII,S$GLB,, | Performed by: INTERNAL MEDICINE

## 2022-02-11 PROCEDURE — 3008F BODY MASS INDEX DOCD: CPT | Mod: CPTII,S$GLB,, | Performed by: INTERNAL MEDICINE

## 2022-02-11 PROCEDURE — 99999 PR PBB SHADOW E&M-EST. PATIENT-LVL IV: CPT | Mod: PBBFAC,,, | Performed by: INTERNAL MEDICINE

## 2022-02-11 PROCEDURE — 1125F PR PAIN SEVERITY QUANTIFIED, PAIN PRESENT: ICD-10-PCS | Mod: CPTII,S$GLB,, | Performed by: INTERNAL MEDICINE

## 2022-02-11 PROCEDURE — 99999 PR PBB SHADOW E&M-EST. PATIENT-LVL IV: ICD-10-PCS | Mod: PBBFAC,,, | Performed by: INTERNAL MEDICINE

## 2022-02-11 PROCEDURE — 3074F SYST BP LT 130 MM HG: CPT | Mod: CPTII,S$GLB,, | Performed by: INTERNAL MEDICINE

## 2022-02-11 PROCEDURE — 99214 PR OFFICE/OUTPT VISIT, EST, LEVL IV, 30-39 MIN: ICD-10-PCS | Mod: S$GLB,,, | Performed by: INTERNAL MEDICINE

## 2022-02-11 PROCEDURE — 1159F PR MEDICATION LIST DOCUMENTED IN MEDICAL RECORD: ICD-10-PCS | Mod: CPTII,S$GLB,, | Performed by: INTERNAL MEDICINE

## 2022-02-11 PROCEDURE — 3074F PR MOST RECENT SYSTOLIC BLOOD PRESSURE < 130 MM HG: ICD-10-PCS | Mod: CPTII,S$GLB,, | Performed by: INTERNAL MEDICINE

## 2022-02-11 PROCEDURE — 3288F PR FALLS RISK ASSESSMENT DOCUMENTED: ICD-10-PCS | Mod: CPTII,S$GLB,, | Performed by: INTERNAL MEDICINE

## 2022-02-11 PROCEDURE — 3078F DIAST BP <80 MM HG: CPT | Mod: CPTII,S$GLB,, | Performed by: INTERNAL MEDICINE

## 2022-02-11 PROCEDURE — 1101F PT FALLS ASSESS-DOCD LE1/YR: CPT | Mod: CPTII,S$GLB,, | Performed by: INTERNAL MEDICINE

## 2022-02-11 RX ORDER — VIT C/E/ZN/COPPR/LUTEIN/ZEAXAN 250MG-90MG
1000 CAPSULE ORAL DAILY
Refills: 0 | COMMUNITY
Start: 2022-02-11

## 2022-02-11 NOTE — PROGRESS NOTES
"Subjective:       Patient ID: Francis Weinberg is a 66 y.o. male.    Chief Complaint: Annual Exam    HPI   Francis Weinberg is a 66 y.o. male here for follow up of annual 11/2021 w AMANDA Churchill.    Labs in Nov '21:  a1c 6.5% - new onset DM  Vit d 27  Mild chronic anemia hgb 13.6 (hgb 13.8 in 2005)    Review of Systems   Constitutional: Negative for fever.   Respiratory: Negative for shortness of breath.    Cardiovascular: Negative for chest pain.   Musculoskeletal: Negative.    Skin: Negative.        Objective:   /72 (BP Location: Right arm, Patient Position: Sitting, BP Method: Small (Manual))   Pulse 69   Ht 6' 1" (1.854 m)   Wt 96.5 kg (212 lb 11.9 oz)   SpO2 97%   BMI 28.07 kg/m²      Physical Exam  Constitutional:       General: He is not in acute distress.     Appearance: He is well-developed. He is not diaphoretic.   HENT:      Head: Normocephalic and atraumatic.   Cardiovascular:      Rate and Rhythm: Normal rate and regular rhythm.      Heart sounds: No murmur heard.      Pulmonary:      Effort: Pulmonary effort is normal. No respiratory distress.      Breath sounds: No wheezing or rales.   Skin:     General: Skin is warm and dry.   Neurological:      Mental Status: He is alert and oriented to person, place, and time.   Psychiatric:         Behavior: Behavior normal.         Protective Sensation (w/ 10 gram monofilament):  Right: Intact  Left: Intact    Visual Inspection:  Normal -  Bilateral    Pedal Pulses:   Right: Present  Left: Present    Posterior tibialis:   Right:Present  Left: Present    Assessment:       1. New onset type 2 diabetes mellitus    2. Vitamin D insufficiency        Plan:       Francis was seen today for annual exam.    Diagnoses and all orders for this visit:    New onset type 2 diabetes mellitus  -     Comprehensive Metabolic Panel; Future  -     Hemoglobin A1C; Future  -     Ambulatory referral/consult to Diabetes Education; Future  -     Microalbumin/Creatinine Ratio, " Urine    Vitamin D insufficiency  -     cholecalciferol, vitamin D3, (VITAMIN D3) 25 mcg (1,000 unit) capsule; Take 1 capsule (1,000 Units total) by mouth once daily.        Eye exam recently done; Dr. River Jaime ph [phone]  Foot exam done today

## 2022-02-11 NOTE — PATIENT INSTRUCTIONS
"Patient Education       Diabetes and Diet   The Basics   Written by the doctors and editors at [facility]   Why is diet important in diabetes? -- Diet is important because it is part of diabetes treatment. Many people need to change what they eat and how much they eat to help treat their diabetes. It is important for people to treat their diabetes so that they:  · Keep their blood sugar at or near a normal level  · Prevent long-term problems, such as heart or kidney problems, that can happen in people with diabetes  Changing your diet can also help treat obesity, high blood pressure, and high cholesterol. These conditions can affect people with diabetes and can lead to future problems, such as heart attacks or strokes.  Who will work with me to change my diet? -- Your doctor or nurse will work with you to make a food plan to change your diet. They might also recommend that you work with a "dietitian." A dietitian is an expert on food and eating.  Do I need to eat at the same times every day? -- When and how often you should eat depends, in part, on the diabetes medicines you take. For example:  · People who take about the same amount of insulin at the same time each day (called a "fixed regimen") should eat meals at the same times. This is also true for people who take pills that increase insulin levels, such as sulfonylureas. Eating meals at the same time every day helps prevent low blood sugar.  · People who adjust the dose and timing of their insulin each day (called a "flexible regimen") do not always have to eat meals at the same time. That's because they can time their insulin dose for before they plan to eat, and also adjust the dose for how much they plan to eat.  · People who take medicines that don't usually cause low blood sugar, such as metformin, don't have to eat meals at the same time every day.  What do I need to think about when planning what to eat? -- Our bodies break down the food we eat into small " "pieces called carbohydrates, proteins, and fats.  When planning what to eat, people with diabetes need to think about:  · Carbohydrates (or "carbs") - Carbohydrates, which are sugars that our bodies use for energy, can raise a person's blood sugar level. Your doctor, nurse, or dietitian will tell you how many carbohydrates you should eat at each meal or snack. Foods that have carbohydrates include:  ? Bread, pasta, and rice  ? Vegetables and fruits  ? Dairy foods  ? Foods and drinks with added sugar  It is best to get your carbohydrates from fruits, vegetables, whole grains, and low-fat milk. It is best to avoid drinks with added sugar, like soda, juices, and sports drinks.   · Protein - Your doctor, nurse, or dietitian will tell you how much protein you should eat each day. It is best to eat lean meats, fish, eggs, beans, peas, soy products, nuts, and seeds.  · Fats - The type of fat you eat is more important than the amount of fat. "Saturated" and "trans" fats can increase your risk for heart problems, like a heart attack.  ? Foods that have saturated fats include meat, butter, cheese, and ice cream.  ? Foods that have trans fats include processed food with "partially hydrogenated oils" on the ingredient list. This may include fried foods, store bought cookies, muffins, pies, and cakes.  "Monounsaturated" and "polyunsaturated" fats are better for you. Foods with these types of fat include fish, avocado, olive oil, and nuts.  · Calories - People need to eat a certain amount of calories each day to keep their weight the same. People who are overweight and want to lose weight need to eat fewer calories each day.  · Fiber - Eating foods with a lot of fiber can help control a person's blood sugar level. Foods that have a lot of fiber include apples, green beans, peas, beans, lentils, nuts, oatmeal, and whole grains.  · Salt - People who have high blood pressure should not eat foods that contain a lot of salt (also " called sodium). People with high blood pressure should also eat healthy foods, such as fruits, vegetables, and low-fat dairy foods.  · Alcohol - Having more than 1 drink (for women) or 2 drinks (for men) a day can raise blood sugar levels. Also, drinks that have fruit juice or soda in them can raise blood sugar levels.  What can I do if I need to lose weight? -- If you need to lose weight, you can:  · Exercise - Try to get at least 30 minutes of physical activity a day, most days of the week. Even gentle exercise, like walking, is good for your health. Some people with diabetes need to change their medicine dose before they exercise. They might also need to check their blood sugar levels before and after exercising.  · Eat fewer calories - Your doctor, nurse, or dietitian can tell you how many calories you should eat each day in order to lose weight.  If you are worried about your weight, size, or shape, talk with your doctor, nurse, or dietitian. They can help you make changes to improve your health.  Can I eat the same foods as my family? -- Yes. You do not need to eat special foods if you have diabetes. You and your family can eat the same foods. Changing your diet is mostly about eating healthy foods and not eating too much.  What are the other parts of diabetes treatment? -- Besides changing your diet, the other parts of diabetes treatment are:  · Exercise  · Medicines  Some people with diabetes need to learn how to match their diet and exercise with their medicine dose. For example, people who use insulin might need to choose the dose of insulin they give themselves. To choose their dose, they need to think about:  · What they plan to eat at the next meal  · How much exercise they plan to do  · What their blood sugar level is  If the diet and exercise do not match the medicine dose, a person's blood sugar level can get too low or too high. Blood sugar levels that are too low or too high can cause  problems.  All topics are updated as new evidence becomes available and our peer review process is complete.  This topic retrieved from [employer] on: Sep 21, 2021.  Topic 60067 Version 7.0  Release: 29.4.2 - C29.263  © 2021 UpToDate, Inc. and/or its affiliates. All rights reserved.  Consumer Information Use and Disclaimer   This information is not specific medical advice and does not replace information you receive from your health care provider. This is only a brief summary of general information. It does NOT include all information about conditions, illnesses, injuries, tests, procedures, treatments, therapies, discharge instructions or life-style choices that may apply to you. You must talk with your health care provider for complete information about your health and treatment options. This information should not be used to decide whether or not to accept your health care provider's advice, instructions or recommendations. Only your health care provider has the knowledge and training to provide advice that is right for you. The use of this information is governed by the [employer] End User License Agreement, available at https://www.[employer]/en/solutions/Uni[employer]/about/syed.The use of [employer] content is governed by the [employer] Terms of Use. ©2021 UpToDate, Inc. All rights reserved.  Copyright   © 2021 UpToDate, Inc. and/or its affiliates. All rights reserved.  Patient Education       Diabetic Meal Planning   About this topic   Healthy eating is an important part of keeping your diabetes in control. A balanced diet along with your diabetic drugs will help you control your blood sugar. The right portions of healthy foods may help keep your sugar within your goal range. It may also help to lower or maintain your weight, manage cholesterol, the amount of fat in your blood, and blood pressure.       What will the results be?   Healthy eating may help you lower your blood sugar and keep it in a safe range.  Keeping your blood sugar in a safe range may lower your chances for long term problems from your diabetes. You may be less likely to have damage to your kidneys, heart, eyes, or nerves.  What changes to diet are needed?   Healthy eating means:  · Eating a range of foods from all food groups.  · Eating the right size portions. Read the nutrition facts on each food you eat.  · Eating meals and snacks at the same time each day. Do not skip a meal or snack. Skipping meals may cause your blood sugar to go too low, especially if you are on drugs for your diabetes. Skipping meals can also cause you to eat too much at the next meal.  Talk to your diabetes educator about making a personal meal plan for you. They can also help you eat the foods you like by modifying them to be healthier.  Who should use this diet?   This diet is helpful to people with high blood sugar or diabetes.   What foods are good to eat?   It is important to make a healthy meal. Eat a variety of different foods in the right portion.  · Fill half of your plate with non-starchy vegetables. Non-starchy vegetables include: Broccoli, green beans, carrots, greens (raciel, kale, mustard, turnip), onions, tomatoes, asparagus, beets, cauliflower, celery, and cucumber. Non-starchy vegetables are high in fiber and low in carbohydrates. These will help keep you full for longer without raising your blood sugar.  · Fill 1/4 of your plate with carbohydrates. Try to choose whole grain options. Whole-grain products have more fiber which will make you feel full. Carbohydrates include: Bread, rice, pasta, and starchy vegetables. Starchy vegetables include beans, potatoes, acorn squash, butternut squash, corn, and peas.  · Fill 1/4 of your plate with protein. Choose low-fat cuts of meat like boneless, skinless chicken breast; pork loin; 90% lean beef; lean and skinless turkey meat; and fresh fish (not fried) such as tuna, salmon, or mackerel.  · Add a low-fat or non-fat  dairy product like 8 ounces (240 mL) of 1% or skim milk or 6 ounces (180 mL) of low-fat yogurt. Eat the recommended serving. Milk and yogurt have carbohydrates which raise your blood sugar.  · Add a small piece of fruit or 1/2 cup (120 grams) of frozen or canned fruit. Choose canned fruits in juice or water. Fruits include apples, bananas, peaches, pears, pineapple, plums, and oranges. Eat the recommended serving. Fruit has carbohydrates which can raise your blood sugar.  · Include healthy fats in your meal like olive oil, canola oil, avocado, and nuts.  Other good foods to include in your diet are:  · Foods high in fiber. Adding fiber to your meals may help control your blood sugar and cholesterol levels. It may also help with weight loss and prevent belly problems. If you are younger than 50, it is recommended to get 25 to 38 grams per day. If you are older than 50, it is recommended to get 21 to 30 grams per day. Good sources of fiber include:  ? Nuts and seeds  ? Beans, peas, and other legumes  ? Whole-grain products  ? Fruits  ? Vegetables  · Smart snacks in the right portion. Do not go too long in between meals. Ask your dietitian when you should have a snack in between your meals. Snack on things like:  ? Nuts  ? Vegetables and low-fat dressing  ? Light popcorn  ? Low-fat cheese and crackers  ? 1/2 sandwich  What foods should be limited or avoided?   You may need to limit the amount of some foods you eat and how often you eat them. Foods that should be limited include:  · High fat or processed foods like:  ? Chand  ? Sausage  ? Hot dogs  ? Whole-fat dairy products  ? Potato chips  · Foods high in sodium like:  ? Canned soups  ? Soy sauce and some salad dressings  ? Cured meats  ? Salted snack foods like pretzels  ? Olives  · Fats and oils like:  ? Margarine  ? Salad dressing  ? Gravy  ? Lard or shortening  · Foods high in sugar like:  ? Candy  ? Cookies  ? Cake  ? Ice cream  ? Table sugar  ? Soda  ? Juice  drinks  · Starches that are not whole grain like:  ? White rice  ? French fries  ? White pasta  ? White bread  ? Sugary cereals  ? Baked goods, pastries, croissants  · Beer, wine, and mixed drinks (alcohol). Drink alcohol in moderation (1 drink per day or less for adult women and 2 drinks per day or less for adult men). Drinking alcohol can cause fluctuations in your blood sugar and interfere with how your diabetic drugs work. Talk to your doctor about how you can safely include alcohol into your diet.  What can be done to prevent this health problem?   Some people have a higher chance of developing diabetes than others. This is a life-long problem. You can still lead a normal life. Diabetes can be managed through diet, exercise, and drugs. It is important to have support from your family and friends to help you with your goals.  When do I need to call the doctor?   · Blood sugar level is above 240 mg/dL for more than a day  · Blood sugar level drops to less than 40 and does not respond to 15 grams of simple carbohydrate, like 4 glucose tablets or 1/2 cup (120 mL) of fruit juice  · Trouble breathing  · Very sleepy and trouble concentrating  · Feeling very thirsty  · Needing to urinate (pee) more than normal  · Throw up more than once or have many loose stools  · You are so sick you cannot eat or drink  · Fever over 101°F (38°C)  · Questions about your diet plan  · You are not feeling better in 2 to 3 days or you are feeling worse  Helpful tips   · Plan ahead. Plan your meals and grocery list before going to the store. This will help you to choose foods that are good for you.  · Pack a lunch. Take healthy meals and snacks with you to work.  · Keep a food journal to help keep you on track. Take note of foods that keep your blood sugar in goal range. Also note foods and meals that raise or lower your blood sugar. There are apps for your phone that can help with this.  · Visit a restaurant's website before eating out.  You can see the menu options and nutritional facts. This way, you can see which items best fit into your diet plan. Call ahead if you have questions.  Last Reviewed Date   2021-03-18  Consumer Information Use and Disclaimer   This information is not specific medical advice and does not replace information you receive from your health care provider. This is only a brief summary of general information. It does NOT include all information about conditions, illnesses, injuries, tests, procedures, treatments, therapies, discharge instructions or life-style choices that may apply to you. You must talk with your health care provider for complete information about your health and treatment options. This information should not be used to decide whether or not to accept your health care providers advice, instructions or recommendations. Only your health care provider has the knowledge and training to provide advice that is right for you.   Copyright   Copyright © 2021 UpToDate, Inc. and its affiliates and/or licensors. All rights reserved.  Patient Education       Guide to Eating When You Have Diabetes   About this topic   This guide will help you control your blood sugar along with exercise and the drugs you are taking. You want to have a balanced amount of carbohydrates, fats, and protein in your meal. Following these diet guidelines may also help control your blood pressure and cholesterol.     What will the results be?   When you follow these diet guidelines, your blood sugar may be easier to keep within the goal blood sugar ranges. Ask your doctor for your goal blood sugar ranges.  What changes to diet are needed?   You need to balance how much carbohydrate, fat, and protein is in your meals. You also need to control the amount or portion size of the food you eat. Eat meals at about the same time every day. Do not skip a meal. Talk to your dietitian about making a personal meal plan for you.     Who should use this  diet?   This diet is helpful to people with high blood sugar or diabetes.   What foods are good to eat?   Whole grains like:  · 1/3 cup (80 grams) brown rice  · 1/3 cup (80 grams) wild rice  · 1/3 cup (80 grams) whole wheat pasta  · 1 slice whole wheat or whole grain bread  · 3/4 cup (180 grams) high-fiber cereal  · 1/2 cup (120 grams) cooked oatmeal  · 1/2 (120 grams) English muffin  Fruits and vegetables like:  · 1/2 cup (120 grams) sweet potatoes  · 1/2 cup (120 grams) cooked vegetables, like squash, green beans, cauliflower, carrots, and cabbage  · 1 cup (240 grams) raw vegetables or salad greens  · 1 small apples or oranges  · 1/2 cup (120 grams) unsweetened fruit juice  Proteins like:  · 1 ounce (30 grams) lean beef or pork  · 1 ounce (30 grams) chicken, skin removed  · 1 ounce (30 grams) turkey, skin removed  · 1 ounce (30 grams) fish  · 1 ounce (30 grams) low-fat cheese or lunch meat  · 1/2 cup (120 grams) cooked beans ? black, kidney, chickpeas, or lentils  · 1 whole egg  What foods should be limited or avoided?   High fat or processed foods like:  · Chand  · Sausage  · Hot dogs  · Processed snacks  Fats and oils like:  · Margarine  · Salad dressings  Foods that are high in salt like:  · Table salt  · Salted breads, rolls, crackers  · Commercially-prepared potatoes and vegetable mixes  · Canned vegetables and juices  · Canned soups  · Smoked, cured, or salted meats  Starches that are not whole grain like:  · White rice  · White potatoes  · French fries  · Pasta  · White bread  · Sugary cereals  · Instant oatmeal  · Baked goods, pastries  · Croissants  What can be done to prevent this health problem?   Type 1 diabetes is a lifelong problem and you cannot prevent it. Type 2 diabetes can be prevented or delayed with lifestyle changes. You can still lead a normal life. Diabetes can be managed through diet, exercise, and drugs. Family members and friends can help you practice good health behaviors.  When do I  need to call the doctor?   · Blood sugar level is above 240 for more than a day  · Blood sugar level drops to less than 40  · Abnormal urine test results  · Trouble breathing  · Very sleepy  · Throw up more than once  · Many loose stools  · Questions about your diet plan  Helpful tips   Try to eat smaller portions of a healthy balanced diet throughout the day. Take time to plan your meals and snacks.  Where can I learn more?   American Diabetes Association  https://www.cdc.gov/diabetes/managing/eat-well/meal-plan-method.html   [employer]  http://www.[employer].org/home-pages/healthy-eating.htm   [employer]  http://www.[employer].org/articles/diet-weight-loss/diabetes-diet-and-food-tips.htm   Last Reviewed Date   2021-07-21  Consumer Information Use and Disclaimer   This information is not specific medical advice and does not replace information you receive from your health care provider. This is only a brief summary of general information. It does NOT include all information about conditions, illnesses, injuries, tests, procedures, treatments, therapies, discharge instructions or life-style choices that may apply to you. You must talk with your health care provider for complete information about your health and treatment options. This information should not be used to decide whether or not to accept your health care providers advice, instructions or recommendations. Only your health care provider has the knowledge and training to provide advice that is right for you.   Copyright   Copyright © 2021 UpToDate, Inc. and its affiliates and/or licensors. All rights reserved.  Patient Education       Hemoglobin A1C Tests   The Basics   Written by the doctors and editors at [employer]   What is hemoglobin A1C? -- Hemoglobin A1C is a blood test that shows what your average blood sugar level has been for the past 2 to 3 months. Doctors and nurses use this test for 2 reasons:  · To see whether a person has diabetes  · To  "see whether diabetes treatment is working the right way  Other names for hemoglobin A1C are "glycated hemoglobin," "HbA1C," or just "A1C."  What should my A1C numbers be? -- That depends on why you have the test.  · When checking for diabetes - If you had an A1C test to see if you have diabetes, your A1C should be 6 or less.  ? If your A1C is 6.5 or higher, it probably means you have diabetes, but you should have the test done again to be sure.  ? If your A1C is between 5.7 and 6.4, you are at risk for getting diabetes. You should probably start doing things that can help prevent diabetes. For example, you should become more active and lose weight (if you are overweight).  · When checking how treatment is working - If you already know you have diabetes, and you had an A1C test to see how well controlled your blood sugar is, your A1C should probably be 7 or less. But you need to check with your doctor on what your level should be. Not everyone with diabetes is the same. Some people need to aim for different A1C levels than others.  Can I do this test at home? -- It is now possible to buy kits to test your A1C at home. But home testing of A1C is not usually necessary.  How often should I have an A1C test? -- That depends on whether you have diabetes and on what your last A1C test showed.  · If you had an A1C test to check for diabetes and your A1C was less than 5.7 (meaning you do not have diabetes), you should have A1C tests done every 3 years.  · If you had an A1C test to check for diabetes and your A1C was between 5.7 and 6.4 (meaning you do not have diabetes but are at risk for it), you should have A1C tests done every 1 to 2 years.  · If you do have diabetes and your blood sugar is well controlled, you should have A1C tests every 6 months.  · If you have diabetes and you recently changed treatment plans or you are having trouble controlling your blood sugar, you should have A1C tests every 3 months.  Why do my " A1C numbers matter? -- Studies show that keeping A1C numbers close to normal helps keep people from getting:  · Diabetic retinopathy, an eye disease that can cause blindness  · Nerve damage caused by diabetes (also called neuropathy)  · Kidney disease  For people with newly diagnosed diabetes, keeping the A1C close to normal might also prevent heart attacks and strokes in the future.  Do I still need to measure my blood sugar at home? -- If your doctor wants you to check your blood sugar at home, you should keep doing so even if you have routine A1C tests. Blood sugar tests tell you what your blood sugar is from moment to moment. That's important information to have, because it lets you know if your medications and lifestyle changes are keeping your blood sugar in a safe range.  All topics are updated as new evidence becomes available and our peer review process is complete.  This topic retrieved from [employer] on: Sep 21, 2021.  Topic 12301 Version 8.0  Release: 29.4.2 - C29.263  © 2021 UpToDate, Inc. and/or its affiliates. All rights reserved.  table 1: A1C level and average blood sugar  If your A1C level is (percent):  That means your average blood sugar level during the past two to three months was:     If you live within the [place], use these values. Your blood sugar is measured in milligrams/deciliter (mg/dL).  If you live outside the [place], use these values. Your blood sugar is measured in millimoles/liter (mmol/L).    5 97 5.4   6 126 7   7 154 8.6   8 183 10.2   9 212 11.8   10 240 13.3   11 269 15   12 298 16.5   13 326 18.1   14 355 19.7   The A1C blood test tells you what your average blood sugar level has been for the past two to three months. This table lists which A1C levels go with which average blood sugar levels. Blood sugar is measured differently within the [place] than it is in most other countries. The column in the middle is for people in the [place]. The column on  the right is for people who live outside the [place].  Graphic 85221 Version 3.0  Consumer Information Use and Disclaimer   This information is not specific medical advice and does not replace information you receive from your health care provider. This is only a brief summary of general information. It does NOT include all information about conditions, illnesses, injuries, tests, procedures, treatments, therapies, discharge instructions or life-style choices that may apply to you. You must talk with your health care provider for complete information about your health and treatment options. This information should not be used to decide whether or not to accept your health care provider's advice, instructions or recommendations. Only your health care provider has the knowledge and training to provide advice that is right for you. The use of this information is governed by the [employer] End User License Agreement, available at https://www.1DayLater.LSN Mobile/en/solutions/Bycler/about/syed.The use of [employer] content is governed by the [employer] Terms of Use. ©2021 UpToDate, Inc. All rights reserved.  Copyright   © 2021 UpToDate, Inc. and/or its affiliates. All rights reserved.

## 2022-02-12 LAB
ALBUMIN/CREAT UR: 8.6 UG/MG (ref 0–30)
CREAT UR-MCNC: 280 MG/DL (ref 23–375)
MICROALBUMIN UR DL<=1MG/L-MCNC: 24 UG/ML

## 2022-02-16 ENCOUNTER — PATIENT OUTREACH (OUTPATIENT)
Dept: ADMINISTRATIVE | Facility: HOSPITAL | Age: 67
End: 2022-02-16
Payer: COMMERCIAL

## 2022-02-16 NOTE — PROGRESS NOTES
There are no preventive care reminders to display for this patient.    HM updated. Immunizations reconciled.  Eye exam records requested.     Alma Rosa Yee LPN   Clinical Care Coordinator  [facility]

## 2022-02-16 NOTE — LETTER
AUTHORIZATION FOR RELEASE OF   CONFIDENTIAL INFORMATION    Dear [facility],    We are seeing Francis Weinberg, date of birth 1955, in the clinic at [facility]. Latoya Olson MD is the patient's PCP. Francis Weinberg has an outstanding lab/procedure at the time we reviewed his chart. In order to help keep his health information updated, he has authorized us to request the following medical record(s):        (  )  MAMMOGRAM                                      (  )  COLONOSCOPY      (  )  PAP SMEAR                                          (  )  OUTSIDE LAB RESULTS     (  )  DEXA SCAN                                          ( x )  EYE EXAM            (  )  FOOT EXAM                                          (  )  ENTIRE RECORD     (  )  OUTSIDE IMMUNIZATIONS                 (  )  _______________         Please fax records to [facility], Jennifer N Braaten, MD, [phone]     If you have any questions, please contact MAGAN Barnes, CCC at [phone]           Patient Name: Francis Weinberg  : 1955  Patient Phone #: [phone]

## 2022-02-18 ENCOUNTER — PATIENT OUTREACH (OUTPATIENT)
Dept: ADMINISTRATIVE | Facility: HOSPITAL | Age: 67
End: 2022-02-18
Payer: COMMERCIAL

## 2022-02-18 NOTE — PROGRESS NOTES
There are no preventive care reminders to display for this patient.     updated. Eye exam results  imported to chart.    Alma Rosa Yee LPN   Clinical Care Coordinator  [facility]

## 2022-02-23 ENCOUNTER — TELEPHONE (OUTPATIENT)
Dept: DIABETES | Facility: CLINIC | Age: 67
End: 2022-02-23
Payer: COMMERCIAL

## 2022-04-06 ENCOUNTER — TELEPHONE (OUTPATIENT)
Dept: ADMINISTRATIVE | Facility: HOSPITAL | Age: 67
End: 2022-04-06
Payer: COMMERCIAL

## 2022-05-24 ENCOUNTER — OFFICE VISIT (OUTPATIENT)
Dept: INTERNAL MEDICINE | Facility: CLINIC | Age: 67
End: 2022-05-24

## 2022-05-24 ENCOUNTER — IMMUNIZATION (OUTPATIENT)
Dept: INTERNAL MEDICINE | Facility: CLINIC | Age: 67
End: 2022-05-24

## 2022-05-24 VITALS
SYSTOLIC BLOOD PRESSURE: 134 MMHG | WEIGHT: 207 LBS | BODY MASS INDEX: 27.43 KG/M2 | HEIGHT: 73 IN | OXYGEN SATURATION: 97 % | HEART RATE: 72 BPM | DIASTOLIC BLOOD PRESSURE: 88 MMHG

## 2022-05-24 DIAGNOSIS — I10 ESSENTIAL HYPERTENSION: Primary | ICD-10-CM

## 2022-05-24 DIAGNOSIS — E11.9 TYPE 2 DIABETES MELLITUS WITHOUT COMPLICATION, WITHOUT LONG-TERM CURRENT USE OF INSULIN: ICD-10-CM

## 2022-05-24 DIAGNOSIS — N52.01 ERECTILE DYSFUNCTION DUE TO ARTERIAL INSUFFICIENCY: ICD-10-CM

## 2022-05-24 DIAGNOSIS — Z23 NEED FOR VACCINATION: Primary | ICD-10-CM

## 2022-05-24 PROCEDURE — 99214 OFFICE O/P EST MOD 30 MIN: CPT | Mod: PBBFAC | Performed by: INTERNAL MEDICINE

## 2022-05-24 PROCEDURE — 99214 PR OFFICE/OUTPT VISIT, EST, LEVL IV, 30-39 MIN: ICD-10-PCS | Mod: S$PBB,,, | Performed by: INTERNAL MEDICINE

## 2022-05-24 PROCEDURE — 99999 PR PBB SHADOW E&M-EST. PATIENT-LVL IV: CPT | Mod: PBBFAC,,, | Performed by: INTERNAL MEDICINE

## 2022-05-24 PROCEDURE — 91305 COVID-19, MRNA, LNP-S, PF, 30 MCG/0.3 ML DOSE VACCINE (PFIZER): CPT | Mod: PBBFAC

## 2022-05-24 PROCEDURE — 99999 PR PBB SHADOW E&M-EST. PATIENT-LVL IV: ICD-10-PCS | Mod: PBBFAC,,, | Performed by: INTERNAL MEDICINE

## 2022-05-24 PROCEDURE — 99214 OFFICE O/P EST MOD 30 MIN: CPT | Mod: S$PBB,,, | Performed by: INTERNAL MEDICINE

## 2022-05-24 RX ORDER — TADALAFIL 20 MG/1
20 TABLET ORAL DAILY PRN
Qty: 6 TABLET | Refills: 11 | Status: SHIPPED | OUTPATIENT
Start: 2022-05-24 | End: 2023-08-09 | Stop reason: SDUPTHER

## 2022-05-24 NOTE — PROGRESS NOTES
"Subjective:       Patient ID: Francis Weinberg is a 66 y.o. male.    Chief Complaint: Follow-up (3 mth)    HPI   66 y.o. male here for follow up of    Recent onset of DM2  a1c 6.5% in 11/2021 -> 6.3% in 2/2022.     HTN  Not on meds.   Too many chips, salty snacks.   Cannot recall home readings.  Review of Systems   Constitutional: Negative for fever.   Respiratory: Negative for shortness of breath.    Cardiovascular: Negative for chest pain.   Musculoskeletal: Negative.    Skin: Negative.        Objective:   /88   Pulse 72   Ht 6' 1" (1.854 m)   Wt 93.9 kg (207 lb 0.2 oz)   SpO2 97%   BMI 27.31 kg/m²      Physical Exam  Constitutional:       General: He is not in acute distress.     Appearance: He is well-developed. He is not diaphoretic.   HENT:      Head: Normocephalic and atraumatic.   Cardiovascular:      Rate and Rhythm: Normal rate and regular rhythm.      Heart sounds: No murmur heard.  Pulmonary:      Effort: Pulmonary effort is normal. No respiratory distress.      Breath sounds: No wheezing or rales.   Skin:     General: Skin is warm and dry.   Neurological:      Mental Status: He is alert and oriented to person, place, and time.   Psychiatric:         Behavior: Behavior normal.         Assessment:       1. Essential hypertension    2. Type 2 diabetes mellitus without complication, without long-term current use of insulin    3. Erectile dysfunction due to arterial insufficiency        Plan:       Francis was seen today for follow-up.    Diagnoses and all orders for this visit:    Essential hypertension  Home log   Follow up 1 month    Type 2 diabetes mellitus without complication, without long-term current use of insulin  a1c at goal  Continue current regimen    Erectile dysfunction due to arterial insufficiency  -     tadalafiL (CIALIS) 20 MG Tab; Take 1 tablet (20 mg total) by mouth daily as needed (erectile dysfunction).            "

## 2022-06-29 ENCOUNTER — TELEPHONE (OUTPATIENT)
Dept: INTERNAL MEDICINE | Facility: CLINIC | Age: 67
End: 2022-06-29

## 2022-06-29 ENCOUNTER — CLINICAL SUPPORT (OUTPATIENT)
Dept: INTERNAL MEDICINE | Facility: CLINIC | Age: 67
End: 2022-06-29
Payer: MEDICARE

## 2022-06-29 VITALS — DIASTOLIC BLOOD PRESSURE: 100 MMHG | HEART RATE: 86 BPM | SYSTOLIC BLOOD PRESSURE: 158 MMHG

## 2022-06-29 PROCEDURE — 99999 PR PBB SHADOW E&M-EST. PATIENT-LVL I: CPT | Mod: PBBFAC,,,

## 2022-06-29 PROCEDURE — 99999 PR PBB SHADOW E&M-EST. PATIENT-LVL I: ICD-10-PCS | Mod: PBBFAC,,,

## 2022-06-29 PROCEDURE — 99211 OFF/OP EST MAY X REQ PHY/QHP: CPT | Mod: PBBFAC

## 2022-06-29 RX ORDER — VALSARTAN 80 MG/1
80 TABLET ORAL DAILY
Qty: 30 TABLET | Refills: 1 | Status: SHIPPED | OUTPATIENT
Start: 2022-06-29 | End: 2022-07-14

## 2022-06-29 NOTE — PROGRESS NOTES
Pt here today for a B/P check. Pt was suppose to f/u with a provider in 1 month. Dr Olson is not in the clinic today. Dr Tai informed of the elevated readings. See her note. Pt will return in 1 month to see Irene JIMENES.

## 2022-06-29 NOTE — TELEPHONE ENCOUNTER
/110, 158/100  Pulse 85  New diabetic.  Start valsartan 80 mg daily.      BP check 1 mo with Irene Napier

## 2022-08-08 ENCOUNTER — LAB VISIT (OUTPATIENT)
Dept: LAB | Facility: HOSPITAL | Age: 67
End: 2022-08-08

## 2022-08-08 ENCOUNTER — OFFICE VISIT (OUTPATIENT)
Dept: INTERNAL MEDICINE | Facility: CLINIC | Age: 67
End: 2022-08-08

## 2022-08-08 VITALS
SYSTOLIC BLOOD PRESSURE: 164 MMHG | DIASTOLIC BLOOD PRESSURE: 100 MMHG | HEART RATE: 88 BPM | WEIGHT: 208.69 LBS | HEIGHT: 73 IN | BODY MASS INDEX: 27.66 KG/M2 | OXYGEN SATURATION: 99 %

## 2022-08-08 DIAGNOSIS — M25.551 RIGHT HIP PAIN: ICD-10-CM

## 2022-08-08 DIAGNOSIS — E11.9 TYPE 2 DIABETES MELLITUS WITHOUT COMPLICATION, WITHOUT LONG-TERM CURRENT USE OF INSULIN: ICD-10-CM

## 2022-08-08 DIAGNOSIS — L72.3 SEBACEOUS CYST: ICD-10-CM

## 2022-08-08 DIAGNOSIS — I10 HYPERTENSION, ESSENTIAL: Primary | ICD-10-CM

## 2022-08-08 DIAGNOSIS — I10 HYPERTENSION, ESSENTIAL: ICD-10-CM

## 2022-08-08 LAB
ANION GAP SERPL CALC-SCNC: 9 MMOL/L (ref 8–16)
BUN SERPL-MCNC: 20 MG/DL (ref 8–23)
CALCIUM SERPL-MCNC: 9.8 MG/DL (ref 8.7–10.5)
CHLORIDE SERPL-SCNC: 108 MMOL/L (ref 95–110)
CO2 SERPL-SCNC: 25 MMOL/L (ref 23–29)
CREAT SERPL-MCNC: 0.9 MG/DL (ref 0.5–1.4)
EST. GFR  (NO RACE VARIABLE): >60 ML/MIN/1.73 M^2
GLUCOSE SERPL-MCNC: 108 MG/DL (ref 70–110)
POTASSIUM SERPL-SCNC: 4.3 MMOL/L (ref 3.5–5.1)
SODIUM SERPL-SCNC: 142 MMOL/L (ref 136–145)

## 2022-08-08 PROCEDURE — 80048 BASIC METABOLIC PNL TOTAL CA: CPT | Performed by: PHYSICIAN ASSISTANT

## 2022-08-08 PROCEDURE — 36415 COLL VENOUS BLD VENIPUNCTURE: CPT | Performed by: PHYSICIAN ASSISTANT

## 2022-08-08 PROCEDURE — 99214 OFFICE O/P EST MOD 30 MIN: CPT | Mod: S$PBB,,, | Performed by: PHYSICIAN ASSISTANT

## 2022-08-08 PROCEDURE — 99214 OFFICE O/P EST MOD 30 MIN: CPT | Mod: PBBFAC | Performed by: PHYSICIAN ASSISTANT

## 2022-08-08 PROCEDURE — 99999 PR PBB SHADOW E&M-EST. PATIENT-LVL IV: CPT | Mod: PBBFAC,,, | Performed by: PHYSICIAN ASSISTANT

## 2022-08-08 PROCEDURE — 99214 PR OFFICE/OUTPT VISIT, EST, LEVL IV, 30-39 MIN: ICD-10-PCS | Mod: S$PBB,,, | Performed by: PHYSICIAN ASSISTANT

## 2022-08-08 PROCEDURE — 99999 PR PBB SHADOW E&M-EST. PATIENT-LVL IV: ICD-10-PCS | Mod: PBBFAC,,, | Performed by: PHYSICIAN ASSISTANT

## 2022-08-08 RX ORDER — VALSARTAN 160 MG/1
160 TABLET ORAL DAILY
Qty: 90 TABLET | Refills: 3 | Status: SHIPPED | OUTPATIENT
Start: 2022-08-08 | End: 2022-11-08 | Stop reason: SDUPTHER

## 2022-08-08 NOTE — PATIENT INSTRUCTIONS
Increase valsartan to 160 mg daily  You can take two of the 80 mg tablets until you run out  Return in 4-6 weeks for blood pressure follow up

## 2022-08-08 NOTE — PROGRESS NOTES
"Subjective:       Patient ID: Francis Weinberg is a 66 y.o. male.        Chief Complaint: Follow-up    Francis Weinberg is an established patient of Latoya Olson MD here today for follow up visit.    DM - diet controlled  Lab Results       Component                Value               Date                       HGBA1C                   6.5 (H)             11/17/2021              HTN - started valsartan 80 mg 7/2022, BP has been variable at home  BP Readings from Last 5 Encounters:  08/08/22 : (!) 164/100  06/29/22 : (!) 158/100  05/24/22 : 134/88  02/11/22 : 118/72  12/08/21 : (!) 163/100    Bench broke, fell onto right hip, occurred about 2 weeks ago, went to  in [place] and x-ray negative for fracture, pain improving, now only occasional, wants to monitor    Sebaceous cyst on right upper back, "for a long time," bothersome and itchy, desires removal         Review of Systems   Constitutional: Negative for appetite change, chills, fatigue and fever.   HENT: Negative for congestion and sore throat.    Eyes: Negative for visual disturbance.   Respiratory: Negative for cough, chest tightness and shortness of breath.    Cardiovascular: Negative for chest pain, palpitations and leg swelling.   Gastrointestinal: Negative for abdominal pain, blood in stool, constipation, diarrhea, nausea and vomiting.   Genitourinary: Negative for dysuria, frequency, hematuria and urgency.   Musculoskeletal: Positive for arthralgias. Negative for back pain.   Skin: Negative for rash.   Neurological: Negative for dizziness, syncope, weakness and headaches.   Psychiatric/Behavioral: Negative for dysphoric mood and sleep disturbance. The patient is not nervous/anxious.        Objective:      Physical Exam  Vitals and nursing note reviewed.   Constitutional:       Appearance: He is well-developed.   HENT:      Head: Normocephalic.      Right Ear: External ear normal.      Left Ear: External ear normal.   Eyes:      Pupils: Pupils are equal, " "round, and reactive to light.   Cardiovascular:      Rate and Rhythm: Normal rate and regular rhythm.      Heart sounds: Normal heart sounds. No murmur heard.    No friction rub. No gallop.   Pulmonary:      Effort: Pulmonary effort is normal. No respiratory distress.      Breath sounds: Normal breath sounds.   Abdominal:      Palpations: Abdomen is soft.      Tenderness: There is no abdominal tenderness.   Musculoskeletal:      Right hip: Normal.   Skin:     General: Skin is warm and dry.          Neurological:      Mental Status: He is alert.         Assessment:       1. Hypertension, essential    2. Sebaceous cyst    3. Right hip pain    4. Type 2 diabetes mellitus without complication, without long-term current use of insulin        Plan:       Francis was seen today for follow-up.    Diagnoses and all orders for this visit:    Hypertension, essential - BMP today, f/u 6 weeks  -     INCREASE: valsartan (DIOVAN) 160 MG tablet; Take 1 tablet (160 mg total) by mouth once daily.  -     Basic Metabolic Panel; Future    Sebaceous cyst  -     Ambulatory referral/consult to General Surgery; Future    Right hip pain - improving, had negative x-ray at outside , will monitor and if not resolving will return for further evaluation    Type 2 diabetes mellitus without complication, without long-term current use of insulin - stable and controlled  Lab Results   Component Value Date    HGBA1C 6.5 (H) 11/17/2021     F/u in 6 weeks for BP check    Pt has been given instructions populated from [employer] database and has verbalized understanding of the after visit summary and information contained wherein.    Follow up with a primary care provider. May go to ER for acute shortness of breath, lightheadedness, fever, or any other emergent complaints or changes in condition.    "This note will be shared with the patient"    Future Appointments   Date Time Provider Department Center   11/8/2022 10:15 AM Latoya Olson MD [facility] " [facility]   11/14/2022  1:00 PM Latoya Olson MD [facility]

## 2022-08-09 ENCOUNTER — TELEPHONE (OUTPATIENT)
Dept: SURGERY | Facility: CLINIC | Age: 67
End: 2022-08-09

## 2022-10-10 ENCOUNTER — PATIENT MESSAGE (OUTPATIENT)
Dept: ADMINISTRATIVE | Facility: HOSPITAL | Age: 67
End: 2022-10-10
Payer: COMMERCIAL

## 2022-10-13 ENCOUNTER — PATIENT OUTREACH (OUTPATIENT)
Dept: ADMINISTRATIVE | Facility: HOSPITAL | Age: 67
End: 2022-10-13
Payer: COMMERCIAL

## 2022-10-27 ENCOUNTER — PATIENT MESSAGE (OUTPATIENT)
Dept: ADMINISTRATIVE | Facility: HOSPITAL | Age: 67
End: 2022-10-27
Payer: COMMERCIAL

## 2022-10-28 ENCOUNTER — PATIENT OUTREACH (OUTPATIENT)
Dept: ADMINISTRATIVE | Facility: HOSPITAL | Age: 67
End: 2022-10-28
Payer: COMMERCIAL

## 2022-10-28 NOTE — PROGRESS NOTES
Health Maintenance Due   Topic Date Due    Low Dose Statin  Never done    COVID-19 Vaccine (5 - Booster for Pfizer series) 07/19/2022    Hemoglobin A1c  08/24/2022    Influenza Vaccine (1) 09/01/2022    Eye Exam  09/21/2022        updated.  Triggered LINKS and care everywhere.  Scheduled labs .     Alma Rosa Yee LPN   Clinical Care Coordinator  [facility]

## 2022-11-01 ENCOUNTER — LAB VISIT (OUTPATIENT)
Dept: LAB | Facility: HOSPITAL | Age: 67
End: 2022-11-01
Attending: INTERNAL MEDICINE
Payer: COMMERCIAL

## 2022-11-01 ENCOUNTER — IMMUNIZATION (OUTPATIENT)
Dept: INTERNAL MEDICINE | Facility: CLINIC | Age: 67
End: 2022-11-01
Payer: COMMERCIAL

## 2022-11-01 DIAGNOSIS — Z23 NEED FOR VACCINATION: Primary | ICD-10-CM

## 2022-11-01 DIAGNOSIS — E11.9 NEW ONSET TYPE 2 DIABETES MELLITUS: ICD-10-CM

## 2022-11-01 LAB
ALBUMIN SERPL BCP-MCNC: 4 G/DL (ref 3.5–5.2)
ALP SERPL-CCNC: 107 U/L (ref 55–135)
ALT SERPL W/O P-5'-P-CCNC: 27 U/L (ref 10–44)
ANION GAP SERPL CALC-SCNC: 9 MMOL/L (ref 8–16)
AST SERPL-CCNC: 21 U/L (ref 10–40)
BILIRUB SERPL-MCNC: 0.4 MG/DL (ref 0.1–1)
BUN SERPL-MCNC: 16 MG/DL (ref 8–23)
CALCIUM SERPL-MCNC: 9.8 MG/DL (ref 8.7–10.5)
CHLORIDE SERPL-SCNC: 108 MMOL/L (ref 95–110)
CO2 SERPL-SCNC: 26 MMOL/L (ref 23–29)
CREAT SERPL-MCNC: 0.9 MG/DL (ref 0.5–1.4)
EST. GFR  (NO RACE VARIABLE): >60 ML/MIN/1.73 M^2
ESTIMATED AVG GLUCOSE: 128 MG/DL (ref 68–131)
GLUCOSE SERPL-MCNC: 82 MG/DL (ref 70–110)
HBA1C MFR BLD: 6.1 % (ref 4–5.6)
POTASSIUM SERPL-SCNC: 4 MMOL/L (ref 3.5–5.1)
PROT SERPL-MCNC: 7.7 G/DL (ref 6–8.4)
SODIUM SERPL-SCNC: 143 MMOL/L (ref 136–145)

## 2022-11-01 PROCEDURE — 36415 COLL VENOUS BLD VENIPUNCTURE: CPT | Performed by: INTERNAL MEDICINE

## 2022-11-01 PROCEDURE — 83036 HEMOGLOBIN GLYCOSYLATED A1C: CPT | Performed by: INTERNAL MEDICINE

## 2022-11-01 PROCEDURE — 0124A COVID-19, MRNA, LNP-S, BIVALENT BOOSTER, PF, 30 MCG/0.3 ML DOSE: CPT | Mod: CV19,PBBFAC | Performed by: INTERNAL MEDICINE

## 2022-11-01 PROCEDURE — 91312 COVID-19, MRNA, LNP-S, BIVALENT BOOSTER, PF, 30 MCG/0.3 ML DOSE: ICD-10-PCS | Mod: S$GLB,,, | Performed by: INTERNAL MEDICINE

## 2022-11-01 PROCEDURE — 91312 COVID-19, MRNA, LNP-S, BIVALENT BOOSTER, PF, 30 MCG/0.3 ML DOSE: CPT | Mod: S$GLB,,, | Performed by: INTERNAL MEDICINE

## 2022-11-01 PROCEDURE — 90694 VACC AIIV4 NO PRSRV 0.5ML IM: CPT | Mod: S$GLB,,, | Performed by: INTERNAL MEDICINE

## 2022-11-01 PROCEDURE — 90694 FLU VACCINE - QUADRIVALENT - ADJUVANTED: ICD-10-PCS | Mod: S$GLB,,, | Performed by: INTERNAL MEDICINE

## 2022-11-01 PROCEDURE — 80053 COMPREHEN METABOLIC PANEL: CPT | Performed by: INTERNAL MEDICINE

## 2022-11-01 PROCEDURE — 90471 IMMUNIZATION ADMIN: CPT | Mod: S$GLB,,, | Performed by: INTERNAL MEDICINE

## 2022-11-01 PROCEDURE — 90471 FLU VACCINE - QUADRIVALENT - ADJUVANTED: ICD-10-PCS | Mod: S$GLB,,, | Performed by: INTERNAL MEDICINE

## 2022-11-08 ENCOUNTER — OFFICE VISIT (OUTPATIENT)
Dept: INTERNAL MEDICINE | Facility: CLINIC | Age: 67
End: 2022-11-08
Payer: COMMERCIAL

## 2022-11-08 VITALS
WEIGHT: 211 LBS | BODY MASS INDEX: 27.96 KG/M2 | DIASTOLIC BLOOD PRESSURE: 100 MMHG | HEART RATE: 82 BPM | HEIGHT: 73 IN | OXYGEN SATURATION: 99 % | SYSTOLIC BLOOD PRESSURE: 166 MMHG

## 2022-11-08 DIAGNOSIS — I10 HYPERTENSION, ESSENTIAL: ICD-10-CM

## 2022-11-08 DIAGNOSIS — E11.9 TYPE 2 DIABETES MELLITUS WITHOUT COMPLICATION, WITHOUT LONG-TERM CURRENT USE OF INSULIN: ICD-10-CM

## 2022-11-08 DIAGNOSIS — I10 ESSENTIAL HYPERTENSION: Primary | ICD-10-CM

## 2022-11-08 PROCEDURE — 99999 PR PBB SHADOW E&M-EST. PATIENT-LVL IV: CPT | Mod: PBBFAC,,, | Performed by: INTERNAL MEDICINE

## 2022-11-08 PROCEDURE — 4010F PR ACE/ARB THEARPY RXD/TAKEN: ICD-10-PCS | Mod: CPTII,S$GLB,, | Performed by: INTERNAL MEDICINE

## 2022-11-08 PROCEDURE — 3008F BODY MASS INDEX DOCD: CPT | Mod: CPTII,S$GLB,, | Performed by: INTERNAL MEDICINE

## 2022-11-08 PROCEDURE — 1159F MED LIST DOCD IN RCRD: CPT | Mod: CPTII,S$GLB,, | Performed by: INTERNAL MEDICINE

## 2022-11-08 PROCEDURE — 99214 PR OFFICE/OUTPT VISIT, EST, LEVL IV, 30-39 MIN: ICD-10-PCS | Mod: S$GLB,,, | Performed by: INTERNAL MEDICINE

## 2022-11-08 PROCEDURE — 1160F RVW MEDS BY RX/DR IN RCRD: CPT | Mod: CPTII,S$GLB,, | Performed by: INTERNAL MEDICINE

## 2022-11-08 PROCEDURE — 99214 OFFICE O/P EST MOD 30 MIN: CPT | Mod: S$GLB,,, | Performed by: INTERNAL MEDICINE

## 2022-11-08 PROCEDURE — 1160F PR REVIEW ALL MEDS BY PRESCRIBER/CLIN PHARMACIST DOCUMENTED: ICD-10-PCS | Mod: CPTII,S$GLB,, | Performed by: INTERNAL MEDICINE

## 2022-11-08 PROCEDURE — 3044F PR MOST RECENT HEMOGLOBIN A1C LEVEL <7.0%: ICD-10-PCS | Mod: CPTII,S$GLB,, | Performed by: INTERNAL MEDICINE

## 2022-11-08 PROCEDURE — 3061F PR NEG MICROALBUMINURIA RESULT DOCUMENTED/REVIEW: ICD-10-PCS | Mod: CPTII,S$GLB,, | Performed by: INTERNAL MEDICINE

## 2022-11-08 PROCEDURE — 3066F NEPHROPATHY DOC TX: CPT | Mod: CPTII,S$GLB,, | Performed by: INTERNAL MEDICINE

## 2022-11-08 PROCEDURE — 3066F PR DOCUMENTATION OF TREATMENT FOR NEPHROPATHY: ICD-10-PCS | Mod: CPTII,S$GLB,, | Performed by: INTERNAL MEDICINE

## 2022-11-08 PROCEDURE — 3061F NEG MICROALBUMINURIA REV: CPT | Mod: CPTII,S$GLB,, | Performed by: INTERNAL MEDICINE

## 2022-11-08 PROCEDURE — 3077F SYST BP >= 140 MM HG: CPT | Mod: CPTII,S$GLB,, | Performed by: INTERNAL MEDICINE

## 2022-11-08 PROCEDURE — 4010F ACE/ARB THERAPY RXD/TAKEN: CPT | Mod: CPTII,S$GLB,, | Performed by: INTERNAL MEDICINE

## 2022-11-08 PROCEDURE — 3008F PR BODY MASS INDEX (BMI) DOCUMENTED: ICD-10-PCS | Mod: CPTII,S$GLB,, | Performed by: INTERNAL MEDICINE

## 2022-11-08 PROCEDURE — 3080F DIAST BP >= 90 MM HG: CPT | Mod: CPTII,S$GLB,, | Performed by: INTERNAL MEDICINE

## 2022-11-08 PROCEDURE — 3080F PR MOST RECENT DIASTOLIC BLOOD PRESSURE >= 90 MM HG: ICD-10-PCS | Mod: CPTII,S$GLB,, | Performed by: INTERNAL MEDICINE

## 2022-11-08 PROCEDURE — 3288F PR FALLS RISK ASSESSMENT DOCUMENTED: ICD-10-PCS | Mod: CPTII,S$GLB,, | Performed by: INTERNAL MEDICINE

## 2022-11-08 PROCEDURE — 99999 PR PBB SHADOW E&M-EST. PATIENT-LVL IV: ICD-10-PCS | Mod: PBBFAC,,, | Performed by: INTERNAL MEDICINE

## 2022-11-08 PROCEDURE — 3288F FALL RISK ASSESSMENT DOCD: CPT | Mod: CPTII,S$GLB,, | Performed by: INTERNAL MEDICINE

## 2022-11-08 PROCEDURE — 1126F AMNT PAIN NOTED NONE PRSNT: CPT | Mod: CPTII,S$GLB,, | Performed by: INTERNAL MEDICINE

## 2022-11-08 PROCEDURE — 1101F PT FALLS ASSESS-DOCD LE1/YR: CPT | Mod: CPTII,S$GLB,, | Performed by: INTERNAL MEDICINE

## 2022-11-08 PROCEDURE — 3044F HG A1C LEVEL LT 7.0%: CPT | Mod: CPTII,S$GLB,, | Performed by: INTERNAL MEDICINE

## 2022-11-08 PROCEDURE — 1159F PR MEDICATION LIST DOCUMENTED IN MEDICAL RECORD: ICD-10-PCS | Mod: CPTII,S$GLB,, | Performed by: INTERNAL MEDICINE

## 2022-11-08 PROCEDURE — 1126F PR PAIN SEVERITY QUANTIFIED, NO PAIN PRESENT: ICD-10-PCS | Mod: CPTII,S$GLB,, | Performed by: INTERNAL MEDICINE

## 2022-11-08 PROCEDURE — 3077F PR MOST RECENT SYSTOLIC BLOOD PRESSURE >= 140 MM HG: ICD-10-PCS | Mod: CPTII,S$GLB,, | Performed by: INTERNAL MEDICINE

## 2022-11-08 PROCEDURE — 1101F PR PT FALLS ASSESS DOC 0-1 FALLS W/OUT INJ PAST YR: ICD-10-PCS | Mod: CPTII,S$GLB,, | Performed by: INTERNAL MEDICINE

## 2022-11-08 RX ORDER — VALSARTAN 320 MG/1
320 TABLET ORAL DAILY
Qty: 90 TABLET | Refills: 3 | Status: SHIPPED | OUTPATIENT
Start: 2022-11-08 | End: 2023-09-01

## 2022-11-08 RX ORDER — ATORVASTATIN CALCIUM 40 MG/1
40 TABLET, FILM COATED ORAL NIGHTLY
Qty: 90 TABLET | Refills: 3 | Status: SHIPPED | OUTPATIENT
Start: 2022-11-08 | End: 2023-09-01

## 2022-11-08 NOTE — PROGRESS NOTES
"Subjective:       Patient ID: Francis Weinberg is a 67 y.o. male.    Chief Complaint: Follow-up (3 mth follow up )    HPI  67 y.o. male here for follow up of    HTn  Valsartan increased to 160mg in August.   Reports at home was 140-142/80 or 90 a few weeks ago.  Misses a dose about 2-3 times in two weeks.     Dm2 diet controlled  6.1% this month     The 10-year ASCVD risk score (Smith VELARDE, et al., 2019) is: 45.9%    Values used to calculate the score:      Age: 67 years      Sex: Male      Is Non- : Yes      Diabetic: Yes      Tobacco smoker: No      Systolic Blood Pressure: 166 mmHg      Is BP treated: Yes      HDL Cholesterol: 53 mg/dL      Total Cholesterol: 187 mg/dL    Review of Systems   Constitutional:  Negative for fever.   Respiratory:  Negative for shortness of breath.    Cardiovascular:  Negative for chest pain.   Musculoskeletal: Negative.    Skin: Negative.      Objective:   BP (!) 166/100 (BP Location: Left arm, Patient Position: Sitting, BP Method: Medium (Manual))   Pulse 82   Ht 6' 1" (1.854 m)   Wt 95.7 kg (210 lb 15.7 oz)   SpO2 99%   BMI 27.84 kg/m²      Physical Exam  Constitutional:       General: He is not in acute distress.     Appearance: He is well-developed. He is not diaphoretic.   HENT:      Head: Normocephalic and atraumatic.   Cardiovascular:      Rate and Rhythm: Normal rate and regular rhythm.      Heart sounds: No murmur heard.  Pulmonary:      Effort: Pulmonary effort is normal. No respiratory distress.      Breath sounds: No wheezing or rales.   Skin:     General: Skin is warm and dry.   Neurological:      Mental Status: He is alert and oriented to person, place, and time.   Psychiatric:         Behavior: Behavior normal.       Assessment:       1. Essential hypertension    2. Type 2 diabetes mellitus without complication, without long-term current use of insulin    3. Hypertension, essential        Plan:       Francis was seen today for " follow-up.    Diagnoses and all orders for this visit:    Essential hypertension  Type 2 diabetes mellitus without complication, without long-term current use of insulin  -     START atorvastatin (LIPITOR) 40 MG tablet; Take 1 tablet (40 mg total) by mouth every evening.    Hypertension, essential  -     INCREASE valsartan (DIOVAN) 320 MG tablet; Take 1 tablet (320 mg total) by mouth once daily.        Start atorvastatin  Increase valsartan  Bp follow up 6 wks me or Irene  Eye exam recently done outside of [facility] - Dr. Zarina Holman [phone]

## 2022-11-16 ENCOUNTER — PATIENT OUTREACH (OUTPATIENT)
Dept: ADMINISTRATIVE | Facility: HOSPITAL | Age: 67
End: 2022-11-16
Payer: COMMERCIAL

## 2022-11-16 NOTE — LETTER
AUTHORIZATION FOR RELEASE OF   CONFIDENTIAL INFORMATION    Dear [facility],    We are seeing Francis Weinberg, date of birth 1955, in the clinic at [facility]. Latoya Olson MD is the patient's PCP. Francis Weinberg has an outstanding lab/procedure at the time we reviewed his chart. In order to help keep his health information updated, he has authorized us to request the following medical record(s):        (  )  MAMMOGRAM                                      (  )  COLONOSCOPY      (  )  PAP SMEAR                                          (  )  OUTSIDE LAB RESULTS     (  )  DEXA SCAN                                          (x  )  EYE EXAM            (  )  FOOT EXAM                                          (  )  ENTIRE RECORD     (  )  OUTSIDE IMMUNIZATIONS                 (  )  _______________         Please fax records to [facility], Jennifer N Braaten, MD, [phone]     If you have any questions, please contact Alma Rosa Yee [facility] at [phone].           Patient Name: Francis Weinberg  : 1955  Patient Phone #: [phone]

## 2022-11-17 NOTE — PROGRESS NOTES
Health Maintenance Due   Topic Date Due    Eye Exam  09/21/2022    PROSTATE-SPECIFIC ANTIGEN  11/17/2022       HM updated.  Triggered LINKS and Care everywhere. Requested eye exam record from [facility] .    Alma Rosa Yee LPN   Clinical Care Coordinator  [facility]

## 2022-11-23 ENCOUNTER — PATIENT OUTREACH (OUTPATIENT)
Dept: ADMINISTRATIVE | Facility: HOSPITAL | Age: 67
End: 2022-11-23
Payer: COMMERCIAL

## 2022-11-23 NOTE — PROGRESS NOTES
Health Maintenance Due   Topic Date Due    PROSTATE-SPECIFIC ANTIGEN  11/17/2022       HM updated.  Eye exam record scanned into chart. Triggered LINKS and Care everywhere.    Alma Rosa Yee LPN   Clinical Care Coordinator  [facility]

## 2023-01-09 ENCOUNTER — OFFICE VISIT (OUTPATIENT)
Dept: INTERNAL MEDICINE | Facility: CLINIC | Age: 68
End: 2023-01-09
Payer: COMMERCIAL

## 2023-01-09 VITALS
SYSTOLIC BLOOD PRESSURE: 152 MMHG | HEART RATE: 92 BPM | BODY MASS INDEX: 27.83 KG/M2 | WEIGHT: 210 LBS | OXYGEN SATURATION: 98 % | HEIGHT: 73 IN | DIASTOLIC BLOOD PRESSURE: 90 MMHG

## 2023-01-09 DIAGNOSIS — N40.1 BPH WITH URINARY OBSTRUCTION: ICD-10-CM

## 2023-01-09 DIAGNOSIS — I10 HYPERTENSION, ESSENTIAL: Primary | ICD-10-CM

## 2023-01-09 DIAGNOSIS — R39.9 LOWER URINARY TRACT SYMPTOMS (LUTS): ICD-10-CM

## 2023-01-09 DIAGNOSIS — E11.9 TYPE 2 DIABETES MELLITUS WITHOUT COMPLICATION, WITHOUT LONG-TERM CURRENT USE OF INSULIN: ICD-10-CM

## 2023-01-09 DIAGNOSIS — N13.8 BPH WITH URINARY OBSTRUCTION: ICD-10-CM

## 2023-01-09 PROCEDURE — 1101F PR PT FALLS ASSESS DOC 0-1 FALLS W/OUT INJ PAST YR: ICD-10-PCS | Mod: CPTII,S$GLB,, | Performed by: PHYSICIAN ASSISTANT

## 2023-01-09 PROCEDURE — 99214 PR OFFICE/OUTPT VISIT, EST, LEVL IV, 30-39 MIN: ICD-10-PCS | Mod: S$GLB,,, | Performed by: PHYSICIAN ASSISTANT

## 2023-01-09 PROCEDURE — 3008F PR BODY MASS INDEX (BMI) DOCUMENTED: ICD-10-PCS | Mod: CPTII,S$GLB,, | Performed by: PHYSICIAN ASSISTANT

## 2023-01-09 PROCEDURE — 3080F DIAST BP >= 90 MM HG: CPT | Mod: CPTII,S$GLB,, | Performed by: PHYSICIAN ASSISTANT

## 2023-01-09 PROCEDURE — 1101F PT FALLS ASSESS-DOCD LE1/YR: CPT | Mod: CPTII,S$GLB,, | Performed by: PHYSICIAN ASSISTANT

## 2023-01-09 PROCEDURE — 3077F SYST BP >= 140 MM HG: CPT | Mod: CPTII,S$GLB,, | Performed by: PHYSICIAN ASSISTANT

## 2023-01-09 PROCEDURE — 3008F BODY MASS INDEX DOCD: CPT | Mod: CPTII,S$GLB,, | Performed by: PHYSICIAN ASSISTANT

## 2023-01-09 PROCEDURE — 1125F AMNT PAIN NOTED PAIN PRSNT: CPT | Mod: CPTII,S$GLB,, | Performed by: PHYSICIAN ASSISTANT

## 2023-01-09 PROCEDURE — 3077F PR MOST RECENT SYSTOLIC BLOOD PRESSURE >= 140 MM HG: ICD-10-PCS | Mod: CPTII,S$GLB,, | Performed by: PHYSICIAN ASSISTANT

## 2023-01-09 PROCEDURE — 1125F PR PAIN SEVERITY QUANTIFIED, PAIN PRESENT: ICD-10-PCS | Mod: CPTII,S$GLB,, | Performed by: PHYSICIAN ASSISTANT

## 2023-01-09 PROCEDURE — 3288F FALL RISK ASSESSMENT DOCD: CPT | Mod: CPTII,S$GLB,, | Performed by: PHYSICIAN ASSISTANT

## 2023-01-09 PROCEDURE — 1159F MED LIST DOCD IN RCRD: CPT | Mod: CPTII,S$GLB,, | Performed by: PHYSICIAN ASSISTANT

## 2023-01-09 PROCEDURE — 99499 UNLISTED E&M SERVICE: CPT | Mod: S$GLB,,, | Performed by: PHYSICIAN ASSISTANT

## 2023-01-09 PROCEDURE — 99214 OFFICE O/P EST MOD 30 MIN: CPT | Mod: S$GLB,,, | Performed by: PHYSICIAN ASSISTANT

## 2023-01-09 PROCEDURE — 3080F PR MOST RECENT DIASTOLIC BLOOD PRESSURE >= 90 MM HG: ICD-10-PCS | Mod: CPTII,S$GLB,, | Performed by: PHYSICIAN ASSISTANT

## 2023-01-09 PROCEDURE — 1159F PR MEDICATION LIST DOCUMENTED IN MEDICAL RECORD: ICD-10-PCS | Mod: CPTII,S$GLB,, | Performed by: PHYSICIAN ASSISTANT

## 2023-01-09 PROCEDURE — 99499 RISK ADDL DX/OHS AUDIT: ICD-10-PCS | Mod: S$GLB,,, | Performed by: PHYSICIAN ASSISTANT

## 2023-01-09 PROCEDURE — 99999 PR PBB SHADOW E&M-EST. PATIENT-LVL IV: ICD-10-PCS | Mod: PBBFAC,,, | Performed by: PHYSICIAN ASSISTANT

## 2023-01-09 PROCEDURE — 1160F RVW MEDS BY RX/DR IN RCRD: CPT | Mod: CPTII,S$GLB,, | Performed by: PHYSICIAN ASSISTANT

## 2023-01-09 PROCEDURE — 99999 PR PBB SHADOW E&M-EST. PATIENT-LVL IV: CPT | Mod: PBBFAC,,, | Performed by: PHYSICIAN ASSISTANT

## 2023-01-09 PROCEDURE — 3288F PR FALLS RISK ASSESSMENT DOCUMENTED: ICD-10-PCS | Mod: CPTII,S$GLB,, | Performed by: PHYSICIAN ASSISTANT

## 2023-01-09 PROCEDURE — 1160F PR REVIEW ALL MEDS BY PRESCRIBER/CLIN PHARMACIST DOCUMENTED: ICD-10-PCS | Mod: CPTII,S$GLB,, | Performed by: PHYSICIAN ASSISTANT

## 2023-01-09 RX ORDER — AMLODIPINE BESYLATE 5 MG/1
5 TABLET ORAL DAILY
Qty: 90 TABLET | Refills: 3 | Status: SHIPPED | OUTPATIENT
Start: 2023-01-09 | End: 2023-02-27

## 2023-01-09 NOTE — PATIENT INSTRUCTIONS
Continue valsartan 320 mg  Continue lipitor 40 mg daily    START amlodipine 5 mg daily in addition to the other meds

## 2023-01-09 NOTE — PROGRESS NOTES
Subjective:       Patient ID: Francis Weinberg is a 67 y.o. male.        Chief Complaint: Follow-up    Francis Weinberg is an established patient of Latoya Olson MD here today for follow up visit.     DM - diet controlled  Lab Results       Component                Value               Date                       HGBA1C                   6.1 (H)             11/01/2022                 HGBA1C                   6.5 (H)             11/17/2021                      HTN -   Dx 7/2022  Currently taking valsartan 320 mg daily  BP remains elevated  BP Readings from Last 5 Encounters:  01/09/23 : (!) 152/90  11/08/22 : (!) 166/100  08/08/22 : (!) 164/100  06/29/22 : (!) 158/100  05/24/22 : 134/88     Started on atorvastatin 40 mg daily due to DM    The 10-year ASCVD risk score (Smith VELARDE, et al., 2019) is: 40.4%    Values used to calculate the score:      Age: 67 years      Sex: Male      Is Non- : Yes      Diabetic: Yes      Tobacco smoker: No      Systolic Blood Pressure: 152 mmHg      Is BP treated: Yes      HDL Cholesterol: 53 mg/dL      Total Cholesterol: 187 mg/dL    Lab Results       Component                Value               Date                       CHOL                          187                 11/17/2021                 TRIG                            86                  11/17/2021                 HDL                             53                  11/17/2021                 LDLCALC                  116.8               11/17/2021              BMI 27 -   Has resumed walking 2 miles several times weekly  Diet overall pretty healthy         Review of Systems   Constitutional:  Negative for appetite change, chills, fatigue and fever.   HENT:  Negative for congestion and sore throat.    Eyes:  Negative for visual disturbance.   Respiratory:  Negative for cough, chest tightness and shortness of breath.    Cardiovascular:  Negative for chest pain, palpitations and leg swelling.   Gastrointestinal:   Negative for abdominal pain, blood in stool, constipation, diarrhea, nausea and vomiting.   Genitourinary:  Negative for dysuria, frequency, hematuria and urgency.   Musculoskeletal:  Negative for arthralgias and back pain.   Skin:  Negative for rash.   Neurological:  Negative for dizziness, syncope, weakness and headaches.   Psychiatric/Behavioral:  Negative for dysphoric mood and sleep disturbance. The patient is not nervous/anxious.      Objective:      Physical Exam  Vitals and nursing note reviewed.   Constitutional:       Appearance: He is well-developed.   HENT:      Head: Normocephalic.      Right Ear: External ear normal.      Left Ear: External ear normal.   Eyes:      Pupils: Pupils are equal, round, and reactive to light.   Cardiovascular:      Rate and Rhythm: Normal rate and regular rhythm.      Heart sounds: Normal heart sounds. No murmur heard.    No friction rub. No gallop.   Pulmonary:      Effort: Pulmonary effort is normal. No respiratory distress.      Breath sounds: Normal breath sounds.   Abdominal:      Palpations: Abdomen is soft.      Tenderness: There is no abdominal tenderness.   Musculoskeletal:         General: No swelling.   Skin:     General: Skin is warm and dry.   Neurological:      General: No focal deficit present.      Mental Status: He is alert.   Psychiatric:         Mood and Affect: Mood normal.       Assessment:       1. Hypertension, essential    2. Lower urinary tract symptoms (LUTS)    3. BPH with urinary obstruction    4. Type 2 diabetes mellitus without complication, without long-term current use of insulin        Plan:       Francis was seen today for follow-up.    Diagnoses and all orders for this visit:    Hypertension, essential - continue valsartan 320 mg, add amlodipine 5 mg, will avoid hctz for now as he is concerned about urinating more frequently  -     START: amLODIPine (NORVASC) 5 MG tablet; Take 1 tablet (5 mg total) by mouth once daily.    Lower urinary tract  "symptoms (LUTS) - stable    BPH with urinary obstruction    Type 2 diabetes mellitus without complication, without long-term current use of insulin - diet controlled, continue lipitor  Lab Results   Component Value Date    HGBA1C 6.1 (H) 11/01/2022    HGBA1C 6.5 (H) 11/17/2021     Lab Results   Component Value Date    CHOL 187 11/17/2021    TRIG 86 11/17/2021    HDL 53 11/17/2021    LDLCALC 116.8 11/17/2021     The 10-year ASCVD risk score (Smith VELARDE, et al., 2019) is: 40.4%    Values used to calculate the score:      Age: 67 years      Sex: Male      Is Non- : Yes      Diabetic: Yes      Tobacco smoker: No      Systolic Blood Pressure: 152 mmHg      Is BP treated: Yes      HDL Cholesterol: 53 mg/dL      Total Cholesterol: 187 mg/dL    F/u in 6 weeks for BP check    Pt has been given instructions populated from patient instructions database and has verbalized understanding of the after visit summary and information contained wherein.    Follow up with a primary care provider. May go to ER for acute shortness of breath, lightheadedness, fever, or any other emergent complaints or changes in condition.    "This note will be shared with the patient"    Future Appointments   Date Time Provider Department Center   2/27/2023  9:00 AM [facility]                 "

## 2023-02-07 DIAGNOSIS — Z00.00 ENCOUNTER FOR MEDICARE ANNUAL WELLNESS EXAM: ICD-10-CM

## 2023-02-09 DIAGNOSIS — Z00.00 ENCOUNTER FOR MEDICARE ANNUAL WELLNESS EXAM: ICD-10-CM

## 2023-02-23 ENCOUNTER — PATIENT OUTREACH (OUTPATIENT)
Dept: ADMINISTRATIVE | Facility: HOSPITAL | Age: 68
End: 2023-02-23
Payer: COMMERCIAL

## 2023-02-23 DIAGNOSIS — N52.01 ERECTILE DYSFUNCTION DUE TO ARTERIAL INSUFFICIENCY: Primary | ICD-10-CM

## 2023-02-23 NOTE — PROGRESS NOTES
Health Maintenance Due   Topic Date Due    PROSTATE-SPECIFIC ANTIGEN  11/17/2022    Foot Exam  02/11/2023    Diabetes Urine Screening  02/11/2023         HM updated. Trihggerted LINKS and Care Everywhere. Upcoming appointment 2/27/23, edited appt notes with reminders of overdue HM topics.    Alma Rosa Yee LPN   Clinical Care Coordinator  [facility]

## 2023-02-27 ENCOUNTER — OFFICE VISIT (OUTPATIENT)
Dept: INTERNAL MEDICINE | Facility: CLINIC | Age: 68
End: 2023-02-27
Payer: COMMERCIAL

## 2023-02-27 VITALS
BODY MASS INDEX: 27.82 KG/M2 | WEIGHT: 209.88 LBS | SYSTOLIC BLOOD PRESSURE: 147 MMHG | HEART RATE: 77 BPM | HEIGHT: 73 IN | DIASTOLIC BLOOD PRESSURE: 92 MMHG | OXYGEN SATURATION: 97 %

## 2023-02-27 DIAGNOSIS — E11.9 TYPE 2 DIABETES MELLITUS WITHOUT COMPLICATION, WITHOUT LONG-TERM CURRENT USE OF INSULIN: ICD-10-CM

## 2023-02-27 DIAGNOSIS — I10 HYPERTENSION, ESSENTIAL: Primary | ICD-10-CM

## 2023-02-27 PROCEDURE — 1126F AMNT PAIN NOTED NONE PRSNT: CPT | Mod: CPTII,S$GLB,, | Performed by: PHYSICIAN ASSISTANT

## 2023-02-27 PROCEDURE — 3080F PR MOST RECENT DIASTOLIC BLOOD PRESSURE >= 90 MM HG: ICD-10-PCS | Mod: CPTII,S$GLB,, | Performed by: PHYSICIAN ASSISTANT

## 2023-02-27 PROCEDURE — 99999 PR PBB SHADOW E&M-EST. PATIENT-LVL IV: CPT | Mod: PBBFAC,,, | Performed by: PHYSICIAN ASSISTANT

## 2023-02-27 PROCEDURE — 1159F MED LIST DOCD IN RCRD: CPT | Mod: CPTII,S$GLB,, | Performed by: PHYSICIAN ASSISTANT

## 2023-02-27 PROCEDURE — 99214 OFFICE O/P EST MOD 30 MIN: CPT | Mod: S$GLB,,, | Performed by: PHYSICIAN ASSISTANT

## 2023-02-27 PROCEDURE — 99999 PR PBB SHADOW E&M-EST. PATIENT-LVL IV: ICD-10-PCS | Mod: PBBFAC,,, | Performed by: PHYSICIAN ASSISTANT

## 2023-02-27 PROCEDURE — 1126F PR PAIN SEVERITY QUANTIFIED, NO PAIN PRESENT: ICD-10-PCS | Mod: CPTII,S$GLB,, | Performed by: PHYSICIAN ASSISTANT

## 2023-02-27 PROCEDURE — 1101F PT FALLS ASSESS-DOCD LE1/YR: CPT | Mod: CPTII,S$GLB,, | Performed by: PHYSICIAN ASSISTANT

## 2023-02-27 PROCEDURE — 99214 PR OFFICE/OUTPT VISIT, EST, LEVL IV, 30-39 MIN: ICD-10-PCS | Mod: S$GLB,,, | Performed by: PHYSICIAN ASSISTANT

## 2023-02-27 PROCEDURE — 3008F BODY MASS INDEX DOCD: CPT | Mod: CPTII,S$GLB,, | Performed by: PHYSICIAN ASSISTANT

## 2023-02-27 PROCEDURE — 3288F PR FALLS RISK ASSESSMENT DOCUMENTED: ICD-10-PCS | Mod: CPTII,S$GLB,, | Performed by: PHYSICIAN ASSISTANT

## 2023-02-27 PROCEDURE — 3008F PR BODY MASS INDEX (BMI) DOCUMENTED: ICD-10-PCS | Mod: CPTII,S$GLB,, | Performed by: PHYSICIAN ASSISTANT

## 2023-02-27 PROCEDURE — 3288F FALL RISK ASSESSMENT DOCD: CPT | Mod: CPTII,S$GLB,, | Performed by: PHYSICIAN ASSISTANT

## 2023-02-27 PROCEDURE — 1101F PR PT FALLS ASSESS DOC 0-1 FALLS W/OUT INJ PAST YR: ICD-10-PCS | Mod: CPTII,S$GLB,, | Performed by: PHYSICIAN ASSISTANT

## 2023-02-27 PROCEDURE — 3077F SYST BP >= 140 MM HG: CPT | Mod: CPTII,S$GLB,, | Performed by: PHYSICIAN ASSISTANT

## 2023-02-27 PROCEDURE — 3077F PR MOST RECENT SYSTOLIC BLOOD PRESSURE >= 140 MM HG: ICD-10-PCS | Mod: CPTII,S$GLB,, | Performed by: PHYSICIAN ASSISTANT

## 2023-02-27 PROCEDURE — 1159F PR MEDICATION LIST DOCUMENTED IN MEDICAL RECORD: ICD-10-PCS | Mod: CPTII,S$GLB,, | Performed by: PHYSICIAN ASSISTANT

## 2023-02-27 PROCEDURE — 1160F RVW MEDS BY RX/DR IN RCRD: CPT | Mod: CPTII,S$GLB,, | Performed by: PHYSICIAN ASSISTANT

## 2023-02-27 PROCEDURE — 3080F DIAST BP >= 90 MM HG: CPT | Mod: CPTII,S$GLB,, | Performed by: PHYSICIAN ASSISTANT

## 2023-02-27 PROCEDURE — 1160F PR REVIEW ALL MEDS BY PRESCRIBER/CLIN PHARMACIST DOCUMENTED: ICD-10-PCS | Mod: CPTII,S$GLB,, | Performed by: PHYSICIAN ASSISTANT

## 2023-02-27 RX ORDER — AMLODIPINE BESYLATE 10 MG/1
10 TABLET ORAL DAILY
Qty: 90 TABLET | Refills: 3 | Status: SHIPPED | OUTPATIENT
Start: 2023-02-27 | End: 2024-03-08

## 2023-02-27 NOTE — PROGRESS NOTES
Subjective:       Patient ID: Francis Weinberg is a 67 y.o. male.        Chief Complaint: Blood Pressure Check    Francis Weinberg is an established patient of Latoya Olson MD here today for follow up visit.     DM - diet controlled  Lab Results       Component                Value               Date                       HGBA1C                   6.1 (H)             11/01/2022                 HGBA1C                   6.5 (H)             11/17/2021                      HTN -   Dx 7/2022  Currently taking valsartan 320 mg daily and amlodipine 5 mg daily  BP remains elevated, need to check pill bottles as he has had multiple dose adjustments    Started on atorvastatin 40 mg daily due to DM     The 10-year ASCVD risk score (Smith VELARDE, et al., 2019) is: 38.5%    Values used to calculate the score:      Age: 67 years      Sex: Male      Is Non- : Yes      Diabetic: Yes      Tobacco smoker: No      Systolic Blood Pressure: 147 mmHg      Is BP treated: Yes      HDL Cholesterol: 53 mg/dL      Total Cholesterol: 187 mg/dL              BMI 27 -   Has resumed walking 2 miles several times weekly  Diet overall pretty healthy         Review of Systems   Constitutional:  Negative for appetite change, chills, fatigue and fever.   HENT:  Negative for congestion and sore throat.    Eyes:  Negative for visual disturbance.   Respiratory:  Negative for cough, chest tightness and shortness of breath.    Cardiovascular:  Negative for chest pain, palpitations and leg swelling.   Gastrointestinal:  Negative for abdominal pain, blood in stool, constipation, diarrhea, nausea and vomiting.   Genitourinary:  Negative for dysuria, frequency, hematuria and urgency.   Musculoskeletal:  Negative for arthralgias and back pain.   Skin:  Negative for rash.   Neurological:  Negative for dizziness, syncope, weakness and headaches.   Psychiatric/Behavioral:  Negative for dysphoric mood and sleep disturbance. The patient is not  nervous/anxious.      Objective:      Physical Exam  Vitals and nursing note reviewed.   Constitutional:       Appearance: He is well-developed.   HENT:      Head: Normocephalic.      Right Ear: External ear normal.      Left Ear: External ear normal.   Eyes:      Pupils: Pupils are equal, round, and reactive to light.   Cardiovascular:      Rate and Rhythm: Normal rate and regular rhythm.      Heart sounds: Normal heart sounds. No murmur heard.    No friction rub. No gallop.   Pulmonary:      Effort: Pulmonary effort is normal. No respiratory distress.      Breath sounds: Normal breath sounds.   Abdominal:      Palpations: Abdomen is soft.      Tenderness: There is no abdominal tenderness.   Musculoskeletal:         General: No swelling.   Skin:     General: Skin is warm and dry.   Neurological:      General: No focal deficit present.      Mental Status: He is alert.   Psychiatric:         Mood and Affect: Mood normal.       Assessment:       1. Hypertension, essential    2. Type 2 diabetes mellitus without complication, without long-term current use of insulin    3. BMI 27.0-27.9,adult          Plan:       Francis was seen today for blood pressure check.    Diagnoses and all orders for this visit:    Hypertension, essential  -     INCREASE: amLODIPine (NORVASC) 10 MG tablet; Take 1 tablet (10 mg total) by mouth once daily.    Type 2 diabetes mellitus without complication, without long-term current use of insulin - diet controlled  Lab Results   Component Value Date    HGBA1C 6.1 (H) 11/01/2022    HGBA1C 6.5 (H) 11/17/2021     BMI 27.0-27.9,adult - continue to exercise, healthy diet    Bring pill bottles next appointment to check    Pt has been given instructions populated from patient instructions database and has verbalized understanding of the after visit summary and information contained wherein.    Follow up with a primary care provider. May go to ER for acute shortness of breath, lightheadedness, fever, or any  "other emergent complaints or changes in condition.    "This note will be shared with the patient"    Future Appointments   Date Time Provider Department Center   3/28/2023  9:00 AM Irene Napier PA-C [facility]                 "

## 2023-02-27 NOTE — PATIENT INSTRUCTIONS
Increase amlodipine to 10 mg daily, so you can take two 5 mg tablets until you run out, then start new prescription for 10 mg    Continue atorvastatin 40 mg and valsartan 320 mg daily    Bring pill bottles next appointment

## 2023-03-01 DIAGNOSIS — E11.9 TYPE 2 DIABETES MELLITUS WITHOUT COMPLICATION: ICD-10-CM

## 2023-03-06 ENCOUNTER — PATIENT MESSAGE (OUTPATIENT)
Dept: ADMINISTRATIVE | Facility: HOSPITAL | Age: 68
End: 2023-03-06
Payer: COMMERCIAL

## 2023-03-28 ENCOUNTER — OFFICE VISIT (OUTPATIENT)
Dept: INTERNAL MEDICINE | Facility: CLINIC | Age: 68
End: 2023-03-28
Payer: COMMERCIAL

## 2023-03-28 VITALS
SYSTOLIC BLOOD PRESSURE: 138 MMHG | BODY MASS INDEX: 27.29 KG/M2 | HEIGHT: 73 IN | OXYGEN SATURATION: 97 % | DIASTOLIC BLOOD PRESSURE: 82 MMHG | HEART RATE: 77 BPM | WEIGHT: 205.94 LBS

## 2023-03-28 DIAGNOSIS — E11.9 TYPE 2 DIABETES MELLITUS WITHOUT COMPLICATION, WITHOUT LONG-TERM CURRENT USE OF INSULIN: ICD-10-CM

## 2023-03-28 DIAGNOSIS — I10 HYPERTENSION, ESSENTIAL: Primary | ICD-10-CM

## 2023-03-28 DIAGNOSIS — Z12.5 SCREENING PSA (PROSTATE SPECIFIC ANTIGEN): ICD-10-CM

## 2023-03-28 DIAGNOSIS — E78.5 HYPERLIPIDEMIA, UNSPECIFIED HYPERLIPIDEMIA TYPE: ICD-10-CM

## 2023-03-28 DIAGNOSIS — L72.3 SEBACEOUS CYST: ICD-10-CM

## 2023-03-28 PROCEDURE — 3079F DIAST BP 80-89 MM HG: CPT | Mod: CPTII,S$GLB,, | Performed by: PHYSICIAN ASSISTANT

## 2023-03-28 PROCEDURE — 3288F PR FALLS RISK ASSESSMENT DOCUMENTED: ICD-10-PCS | Mod: CPTII,S$GLB,, | Performed by: PHYSICIAN ASSISTANT

## 2023-03-28 PROCEDURE — 3075F SYST BP GE 130 - 139MM HG: CPT | Mod: CPTII,S$GLB,, | Performed by: PHYSICIAN ASSISTANT

## 2023-03-28 PROCEDURE — 1101F PT FALLS ASSESS-DOCD LE1/YR: CPT | Mod: CPTII,S$GLB,, | Performed by: PHYSICIAN ASSISTANT

## 2023-03-28 PROCEDURE — 3008F BODY MASS INDEX DOCD: CPT | Mod: CPTII,S$GLB,, | Performed by: PHYSICIAN ASSISTANT

## 2023-03-28 PROCEDURE — 3288F FALL RISK ASSESSMENT DOCD: CPT | Mod: CPTII,S$GLB,, | Performed by: PHYSICIAN ASSISTANT

## 2023-03-28 PROCEDURE — 99999 PR PBB SHADOW E&M-EST. PATIENT-LVL IV: CPT | Mod: PBBFAC,,, | Performed by: PHYSICIAN ASSISTANT

## 2023-03-28 PROCEDURE — 1160F RVW MEDS BY RX/DR IN RCRD: CPT | Mod: CPTII,S$GLB,, | Performed by: PHYSICIAN ASSISTANT

## 2023-03-28 PROCEDURE — 1159F PR MEDICATION LIST DOCUMENTED IN MEDICAL RECORD: ICD-10-PCS | Mod: CPTII,S$GLB,, | Performed by: PHYSICIAN ASSISTANT

## 2023-03-28 PROCEDURE — 3075F PR MOST RECENT SYSTOLIC BLOOD PRESS GE 130-139MM HG: ICD-10-PCS | Mod: CPTII,S$GLB,, | Performed by: PHYSICIAN ASSISTANT

## 2023-03-28 PROCEDURE — 99214 OFFICE O/P EST MOD 30 MIN: CPT | Mod: S$GLB,,, | Performed by: PHYSICIAN ASSISTANT

## 2023-03-28 PROCEDURE — 99999 PR PBB SHADOW E&M-EST. PATIENT-LVL IV: ICD-10-PCS | Mod: PBBFAC,,, | Performed by: PHYSICIAN ASSISTANT

## 2023-03-28 PROCEDURE — 4010F ACE/ARB THERAPY RXD/TAKEN: CPT | Mod: CPTII,S$GLB,, | Performed by: PHYSICIAN ASSISTANT

## 2023-03-28 PROCEDURE — 1101F PR PT FALLS ASSESS DOC 0-1 FALLS W/OUT INJ PAST YR: ICD-10-PCS | Mod: CPTII,S$GLB,, | Performed by: PHYSICIAN ASSISTANT

## 2023-03-28 PROCEDURE — 3079F PR MOST RECENT DIASTOLIC BLOOD PRESSURE 80-89 MM HG: ICD-10-PCS | Mod: CPTII,S$GLB,, | Performed by: PHYSICIAN ASSISTANT

## 2023-03-28 PROCEDURE — 4010F PR ACE/ARB THEARPY RXD/TAKEN: ICD-10-PCS | Mod: CPTII,S$GLB,, | Performed by: PHYSICIAN ASSISTANT

## 2023-03-28 PROCEDURE — 1126F PR PAIN SEVERITY QUANTIFIED, NO PAIN PRESENT: ICD-10-PCS | Mod: CPTII,S$GLB,, | Performed by: PHYSICIAN ASSISTANT

## 2023-03-28 PROCEDURE — 3008F PR BODY MASS INDEX (BMI) DOCUMENTED: ICD-10-PCS | Mod: CPTII,S$GLB,, | Performed by: PHYSICIAN ASSISTANT

## 2023-03-28 PROCEDURE — 99214 PR OFFICE/OUTPT VISIT, EST, LEVL IV, 30-39 MIN: ICD-10-PCS | Mod: S$GLB,,, | Performed by: PHYSICIAN ASSISTANT

## 2023-03-28 PROCEDURE — 1160F PR REVIEW ALL MEDS BY PRESCRIBER/CLIN PHARMACIST DOCUMENTED: ICD-10-PCS | Mod: CPTII,S$GLB,, | Performed by: PHYSICIAN ASSISTANT

## 2023-03-28 PROCEDURE — 1126F AMNT PAIN NOTED NONE PRSNT: CPT | Mod: CPTII,S$GLB,, | Performed by: PHYSICIAN ASSISTANT

## 2023-03-28 PROCEDURE — 1159F MED LIST DOCD IN RCRD: CPT | Mod: CPTII,S$GLB,, | Performed by: PHYSICIAN ASSISTANT

## 2023-03-28 NOTE — PROGRESS NOTES
"Subjective:       Patient ID: Francis Weinberg is a 67 y.o. male.        Chief Complaint: Blood Pressure Check    Francis Weinberg is an established patient of Latoya Olson MD here today for follow up visit.    DM - diet controlled  Lab Results       Component                Value               Date                       HGBA1C                   6.1 (H)             11/01/2022                 HGBA1C                   6.5 (H)             11/17/2021              HTN - dx 7/2022  Valsartan 320 mg daily  Amlodipine 10 mg daily  BP much improved today    Atorvastatin 40 mg due to DM    BMI 27 - resumed walking regularly, diet healthy    Sebaceous cyst on right upper back, "for a long time," bothersome and itchy, desires removal, previously referred to general surgery but never scheduled, desires repeat referral         Review of Systems   Constitutional:  Negative for appetite change, chills, fatigue and fever.   HENT:  Negative for congestion and sore throat.    Eyes:  Negative for visual disturbance.   Respiratory:  Negative for cough, chest tightness and shortness of breath.    Cardiovascular:  Negative for chest pain, palpitations and leg swelling.   Gastrointestinal:  Negative for abdominal pain, blood in stool, constipation, diarrhea, nausea and vomiting.   Genitourinary:  Negative for dysuria, frequency, hematuria and urgency.   Musculoskeletal:  Negative for arthralgias and back pain.   Skin:  Negative for rash.   Neurological:  Negative for dizziness, syncope, weakness and headaches.   Psychiatric/Behavioral:  Negative for dysphoric mood and sleep disturbance. The patient is not nervous/anxious.      Objective:      Physical Exam  Vitals and nursing note reviewed.   Constitutional:       Appearance: He is well-developed.   HENT:      Head: Normocephalic.      Right Ear: External ear normal.      Left Ear: External ear normal.   Eyes:      Pupils: Pupils are equal, round, and reactive to light.   Cardiovascular:      " Rate and Rhythm: Normal rate and regular rhythm.      Heart sounds: Normal heart sounds. No murmur heard.    No friction rub. No gallop.   Pulmonary:      Effort: Pulmonary effort is normal. No respiratory distress.      Breath sounds: Normal breath sounds.   Abdominal:      Palpations: Abdomen is soft.      Tenderness: There is no abdominal tenderness.   Musculoskeletal:         General: No swelling.   Skin:     General: Skin is warm and dry.   Neurological:      General: No focal deficit present.      Mental Status: He is alert.   Psychiatric:         Mood and Affect: Mood normal.       Assessment:       1. Hypertension, essential    2. Type 2 diabetes mellitus without complication, without long-term current use of insulin    3. BMI 27.0-27.9,adult    4. Hyperlipidemia, unspecified hyperlipidemia type    5. Screening PSA (prostate specific antigen)    6. Sebaceous cyst          Plan:       Loretto was seen today for blood pressure check.    Diagnoses and all orders for this visit:    Hypertension, essential - stable and controlled, continue current regimen  -     CBC Auto Differential; Future  -     Comprehensive Metabolic Panel; Future  -     TSH; Future    Type 2 diabetes mellitus without complication, without long-term current use of insulin - diet controlled, continue to monitor  -     Comprehensive Metabolic Panel; Future  -     Hemoglobin A1C; Future     BMI 27.0-27.9,adult - resumed walking regularly    Hyperlipidemia, unspecified hyperlipidemia type - stable and controlled, continue current regimen  -     Comprehensive Metabolic Panel; Future  -     Lipid Panel; Future    Screening PSA (prostate specific antigen)  -     PSA, Screening; Future    Sebaceous cyst  -     Ambulatory referral/consult to General Surgery; Future    Labs in 3-4 months and f/u with Dr. Olson at that time    Pt has been given instructions populated from patient instructions database and has verbalized understanding of the after  "visit summary and information contained wherein.    Follow up with a primary care provider. May go to ER for acute shortness of breath, lightheadedness, fever, or any other emergent complaints or changes in condition.    "This note will be shared with the patient"    Future Appointments   Date Time Provider Department Center   3/29/2023  3:00 PM Denise Lin MD [facility]   6/28/2023  8:30 AM LAB, APPOINTMENT [facility] LAB IM [facility]   8/9/2023  3:00 PM Latoya Olson MD [facility]                 "

## 2023-03-29 ENCOUNTER — OFFICE VISIT (OUTPATIENT)
Dept: SURGERY | Facility: CLINIC | Age: 68
End: 2023-03-29
Payer: COMMERCIAL

## 2023-03-29 VITALS
HEART RATE: 91 BPM | HEIGHT: 73 IN | DIASTOLIC BLOOD PRESSURE: 90 MMHG | BODY MASS INDEX: 27.19 KG/M2 | WEIGHT: 205.13 LBS | OXYGEN SATURATION: 98 % | SYSTOLIC BLOOD PRESSURE: 138 MMHG

## 2023-03-29 DIAGNOSIS — L72.3 SEBACEOUS CYST: ICD-10-CM

## 2023-03-29 PROCEDURE — 3075F PR MOST RECENT SYSTOLIC BLOOD PRESS GE 130-139MM HG: ICD-10-PCS | Mod: CPTII,S$GLB,, | Performed by: STUDENT IN AN ORGANIZED HEALTH CARE EDUCATION/TRAINING PROGRAM

## 2023-03-29 PROCEDURE — 3075F SYST BP GE 130 - 139MM HG: CPT | Mod: CPTII,S$GLB,, | Performed by: STUDENT IN AN ORGANIZED HEALTH CARE EDUCATION/TRAINING PROGRAM

## 2023-03-29 PROCEDURE — 99202 OFFICE O/P NEW SF 15 MIN: CPT | Mod: S$GLB,,, | Performed by: STUDENT IN AN ORGANIZED HEALTH CARE EDUCATION/TRAINING PROGRAM

## 2023-03-29 PROCEDURE — 4010F PR ACE/ARB THEARPY RXD/TAKEN: ICD-10-PCS | Mod: CPTII,S$GLB,, | Performed by: STUDENT IN AN ORGANIZED HEALTH CARE EDUCATION/TRAINING PROGRAM

## 2023-03-29 PROCEDURE — 1159F MED LIST DOCD IN RCRD: CPT | Mod: CPTII,S$GLB,, | Performed by: STUDENT IN AN ORGANIZED HEALTH CARE EDUCATION/TRAINING PROGRAM

## 2023-03-29 PROCEDURE — 1126F PR PAIN SEVERITY QUANTIFIED, NO PAIN PRESENT: ICD-10-PCS | Mod: CPTII,S$GLB,, | Performed by: STUDENT IN AN ORGANIZED HEALTH CARE EDUCATION/TRAINING PROGRAM

## 2023-03-29 PROCEDURE — 1101F PR PT FALLS ASSESS DOC 0-1 FALLS W/OUT INJ PAST YR: ICD-10-PCS | Mod: CPTII,S$GLB,, | Performed by: STUDENT IN AN ORGANIZED HEALTH CARE EDUCATION/TRAINING PROGRAM

## 2023-03-29 PROCEDURE — 99999 PR PBB SHADOW E&M-EST. PATIENT-LVL III: CPT | Mod: PBBFAC,,, | Performed by: STUDENT IN AN ORGANIZED HEALTH CARE EDUCATION/TRAINING PROGRAM

## 2023-03-29 PROCEDURE — 1126F AMNT PAIN NOTED NONE PRSNT: CPT | Mod: CPTII,S$GLB,, | Performed by: STUDENT IN AN ORGANIZED HEALTH CARE EDUCATION/TRAINING PROGRAM

## 2023-03-29 PROCEDURE — 3008F PR BODY MASS INDEX (BMI) DOCUMENTED: ICD-10-PCS | Mod: CPTII,S$GLB,, | Performed by: STUDENT IN AN ORGANIZED HEALTH CARE EDUCATION/TRAINING PROGRAM

## 2023-03-29 PROCEDURE — 1159F PR MEDICATION LIST DOCUMENTED IN MEDICAL RECORD: ICD-10-PCS | Mod: CPTII,S$GLB,, | Performed by: STUDENT IN AN ORGANIZED HEALTH CARE EDUCATION/TRAINING PROGRAM

## 2023-03-29 PROCEDURE — 3288F PR FALLS RISK ASSESSMENT DOCUMENTED: ICD-10-PCS | Mod: CPTII,S$GLB,, | Performed by: STUDENT IN AN ORGANIZED HEALTH CARE EDUCATION/TRAINING PROGRAM

## 2023-03-29 PROCEDURE — 3080F DIAST BP >= 90 MM HG: CPT | Mod: CPTII,S$GLB,, | Performed by: STUDENT IN AN ORGANIZED HEALTH CARE EDUCATION/TRAINING PROGRAM

## 2023-03-29 PROCEDURE — 3008F BODY MASS INDEX DOCD: CPT | Mod: CPTII,S$GLB,, | Performed by: STUDENT IN AN ORGANIZED HEALTH CARE EDUCATION/TRAINING PROGRAM

## 2023-03-29 PROCEDURE — 4010F ACE/ARB THERAPY RXD/TAKEN: CPT | Mod: CPTII,S$GLB,, | Performed by: STUDENT IN AN ORGANIZED HEALTH CARE EDUCATION/TRAINING PROGRAM

## 2023-03-29 PROCEDURE — 99202 PR OFFICE/OUTPT VISIT, NEW, LEVL II, 15-29 MIN: ICD-10-PCS | Mod: S$GLB,,, | Performed by: STUDENT IN AN ORGANIZED HEALTH CARE EDUCATION/TRAINING PROGRAM

## 2023-03-29 PROCEDURE — 3288F FALL RISK ASSESSMENT DOCD: CPT | Mod: CPTII,S$GLB,, | Performed by: STUDENT IN AN ORGANIZED HEALTH CARE EDUCATION/TRAINING PROGRAM

## 2023-03-29 PROCEDURE — 3080F PR MOST RECENT DIASTOLIC BLOOD PRESSURE >= 90 MM HG: ICD-10-PCS | Mod: CPTII,S$GLB,, | Performed by: STUDENT IN AN ORGANIZED HEALTH CARE EDUCATION/TRAINING PROGRAM

## 2023-03-29 PROCEDURE — 99999 PR PBB SHADOW E&M-EST. PATIENT-LVL III: ICD-10-PCS | Mod: PBBFAC,,, | Performed by: STUDENT IN AN ORGANIZED HEALTH CARE EDUCATION/TRAINING PROGRAM

## 2023-03-29 PROCEDURE — 1101F PT FALLS ASSESS-DOCD LE1/YR: CPT | Mod: CPTII,S$GLB,, | Performed by: STUDENT IN AN ORGANIZED HEALTH CARE EDUCATION/TRAINING PROGRAM

## 2023-03-29 NOTE — PROGRESS NOTES
General Surgery Office Visit   History and Physical    Patient Name: Francis Weinberg  YOB: 1955 (67 y.o.)  MRN: [id number]  Today's Date: 03/29/2023    Referring Md:   Irene Napier Pa-c  [address] [place] [address]    SUBJECTIVE:     Chief Complaint: Back mass    History of Present Illness:  Francis Weinberg is a 67 y.o. male with PMHx of HTN, BPH, DMII who presents to the clinic today complaining of a mass to his back which he first noticed years ago. He reports the mass has been about the same size. He denies redness or drainage. Not painful. No known aggravating or alleviating factors. He denies fever, chills, unintentional weight loss, n/v/d, constipation, hematochezia, dysuria, hematuria, CP, SOB, and all other symptoms. Patient reports being compliant with home medication regimen.     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 cannot come earlier. EC     Family History   Problem Relation Age of Onset    Cataracts Mother     Hypertension Mother     Cerebral aneurysm Father     No Known Problems Brother     No Known Problems Sister     No Known Problems Maternal Aunt     No Known Problems Maternal Uncle     No Known Problems Paternal Aunt     No Known Problems Paternal Uncle     No Known Problems Maternal Grandmother     No Known Problems Maternal Grandfather      "No Known Problems Paternal Grandmother     No Known Problems Paternal Grandfather     Amblyopia Neg Hx     Blindness Neg Hx     Cancer Neg Hx     Diabetes Neg Hx     Glaucoma Neg Hx     Macular degeneration Neg Hx     Retinal detachment Neg Hx     Strabismus Neg Hx     Stroke Neg Hx     Thyroid disease Neg Hx      Social History     Tobacco Use    Smoking status: Never    Smokeless tobacco: Never   Substance Use Topics    Alcohol use: Yes     Comment: prevously socially, stopped for Lent 2019 and now rarely    Drug use: No        Review of Systems:  Review of Systems   Constitutional:  Negative for chills, diaphoresis, fever and weight loss.   Respiratory:  Negative for cough and shortness of breath.    Cardiovascular:  Negative for chest pain.   Gastrointestinal:  Negative for nausea and vomiting.   Genitourinary:  Negative for dysuria and hematuria.   Musculoskeletal:  Negative for falls.   Skin:  Negative for rash.        (+) back mass   Neurological:  Negative for dizziness and headaches.   Psychiatric/Behavioral:  Negative for substance abuse. The patient is not nervous/anxious.    All other systems reviewed and are negative.    OBJECTIVE:     Vital Signs (Most Recent)  BP (!) 138/90 (BP Location: Left arm, Patient Position: Sitting)   Pulse 91   Ht 6' 1" (1.854 m)   Wt 93.1 kg (205 lb 2.2 oz)   SpO2 98%   BMI 27.06 kg/m²     Physical Exam  Vitals and nursing note reviewed.   Constitutional:       General: He is not in acute distress.     Appearance: He is not ill-appearing or diaphoretic.      Comments: Room air   HENT:      Head: Normocephalic and atraumatic.      Mouth/Throat:      Mouth: Mucous membranes are moist.      Pharynx: Oropharynx is clear.   Eyes:      Extraocular Movements: Extraocular movements intact.      Conjunctiva/sclera: Conjunctivae normal.   Cardiovascular:      Rate and Rhythm: Normal rate.   Pulmonary:      Effort: Pulmonary effort is normal. No respiratory distress.   Abdominal: "      General: There is no distension.      Palpations: Abdomen is soft.      Tenderness: There is no abdominal tenderness. There is no guarding or rebound.   Musculoskeletal:         General: No deformity.   Skin:     General: Skin is warm and dry.      Coloration: Skin is not jaundiced.          Neurological:      Mental Status: He is alert and oriented to person, place, and time.     ASSESSMENT/PLAN:     Frnacis Weinberg is a 67 y.o. male with PMHx of HTN, BPH, DMII who presents to the clinic today complaining of a mass to his back. Clinically stable without signs of infection     Francis was seen today for consult.    Diagnoses and all orders for this visit:    Sebaceous cyst  -     Ambulatory referral/consult to General Surgery      - Discussed risks and benefits of both operative and nonoperative management with patient in detail. Patient elected for removal  - Schedule for removal in minor procedure room with Dr. Lin  - ED precautions given  - Continue any current medications  - Call with any questions or concerns  - Discussed POC with patient. All questions were answered. Patient is agreeable to plan and verbalized understanding.     Case discussed with Dr. Lin.       ANSLEY Otto, PA-C  General Surgery  - [facility]

## 2023-04-05 ENCOUNTER — PROCEDURE VISIT (OUTPATIENT)
Dept: SURGERY | Facility: CLINIC | Age: 68
End: 2023-04-05
Payer: COMMERCIAL

## 2023-04-05 VITALS
WEIGHT: 204.94 LBS | SYSTOLIC BLOOD PRESSURE: 137 MMHG | DIASTOLIC BLOOD PRESSURE: 84 MMHG | OXYGEN SATURATION: 98 % | HEART RATE: 72 BPM | BODY MASS INDEX: 27.16 KG/M2 | HEIGHT: 73 IN

## 2023-04-05 DIAGNOSIS — R22.2 SUBCUTANEOUS MASS OF BACK: Primary | ICD-10-CM

## 2023-04-05 PROCEDURE — 11406 EXC TR-EXT B9+MARG >4.0 CM: CPT | Mod: S$GLB,,, | Performed by: STUDENT IN AN ORGANIZED HEALTH CARE EDUCATION/TRAINING PROGRAM

## 2023-04-05 PROCEDURE — 11406 PR EXC SKIN BENIG >4 CM TRUNK,ARM,LEG: ICD-10-PCS | Mod: S$GLB,,, | Performed by: STUDENT IN AN ORGANIZED HEALTH CARE EDUCATION/TRAINING PROGRAM

## 2023-04-05 PROCEDURE — 88307 PR  SURG PATH,LEVEL V: ICD-10-PCS | Mod: 26,,, | Performed by: PATHOLOGY

## 2023-04-05 PROCEDURE — 88307 TISSUE EXAM BY PATHOLOGIST: CPT | Performed by: PATHOLOGY

## 2023-04-05 PROCEDURE — 12042 PR LAYR CLOS WND REST BODY 2.6-7.5 CM: ICD-10-PCS | Mod: 51,S$GLB,, | Performed by: STUDENT IN AN ORGANIZED HEALTH CARE EDUCATION/TRAINING PROGRAM

## 2023-04-05 PROCEDURE — 12042 INTMD RPR N-HF/GENIT2.6-7.5: CPT | Mod: 51,S$GLB,, | Performed by: STUDENT IN AN ORGANIZED HEALTH CARE EDUCATION/TRAINING PROGRAM

## 2023-04-05 PROCEDURE — 88307 TISSUE EXAM BY PATHOLOGIST: CPT | Mod: 26,,, | Performed by: PATHOLOGY

## 2023-04-10 NOTE — PROCEDURES
Exc, Cyst    Date/Time: 4/5/2023 8:45 AM  Performed by: Denise Lin MD  Authorized by: Denise Lin MD     Consent Done?:  Yes (Written)  Timeout: prior to procedure the correct patient, procedure, and site was verified    Prep: patient was prepped and draped in usual sterile fashion    Local anesthesia used?: Yes    Anesthesia:  Local infiltration  Local anesthetic:  Lidocaine 1% with epinephrine  Anesthetic total (ml):  8  Assistants?: No     I was present for the entire procedure.  Indications:  Cyst  Body area:  Back / flank  Laterality:  Right  Position:  Prone  Anesthesia:  Local infiltration  Local anesthetic:  Lidocaine 1% with epinephrine  Excision type:  Skin  Malignancy:  Benign  Scalpel size:  15  Incision type:  Elliptical  Specimens?: Yes     Specimens submitted to pathology.   Hemostasis was obtained.  Estimated blood loss (cc):  5  Wound closure:  Intermediate layered  Wound repair size (cm):  5.5  Sutures:  3-0 Vicryl and 4-0 Monocryl  Dressings: Dermabond.   Needle, instrument, and sponge counts were correct.   Patient tolerated the procedure well with no immediate complications.   Post-operative instructions were provided for the patient.   Patient was discharged and will follow up for wound check and pathology results.  Comments:      One larger skin cyst removed - 4cm  One small cyst next to first cyst removed  1.5cm  Both skin lesions excised in same fashion and wounds closed similarly.

## 2023-04-13 LAB
FINAL PATHOLOGIC DIAGNOSIS: NORMAL
Lab: NORMAL

## 2023-04-19 ENCOUNTER — OFFICE VISIT (OUTPATIENT)
Dept: SURGERY | Facility: CLINIC | Age: 68
End: 2023-04-19
Payer: COMMERCIAL

## 2023-04-19 VITALS
DIASTOLIC BLOOD PRESSURE: 85 MMHG | BODY MASS INDEX: 27.1 KG/M2 | HEIGHT: 73 IN | HEART RATE: 87 BPM | OXYGEN SATURATION: 98 % | WEIGHT: 204.5 LBS | SYSTOLIC BLOOD PRESSURE: 138 MMHG

## 2023-04-19 DIAGNOSIS — E11.9 TYPE 2 DIABETES MELLITUS WITHOUT COMPLICATION: ICD-10-CM

## 2023-04-19 DIAGNOSIS — Z09 S/P EXCISION OF SKIN LESION, FOLLOW-UP EXAM: Primary | ICD-10-CM

## 2023-04-19 PROCEDURE — 3008F BODY MASS INDEX DOCD: CPT | Mod: CPTII,S$GLB,, | Performed by: STUDENT IN AN ORGANIZED HEALTH CARE EDUCATION/TRAINING PROGRAM

## 2023-04-19 PROCEDURE — 99999 PR PBB SHADOW E&M-EST. PATIENT-LVL III: ICD-10-PCS | Mod: PBBFAC,,, | Performed by: STUDENT IN AN ORGANIZED HEALTH CARE EDUCATION/TRAINING PROGRAM

## 2023-04-19 PROCEDURE — 3288F FALL RISK ASSESSMENT DOCD: CPT | Mod: CPTII,S$GLB,, | Performed by: STUDENT IN AN ORGANIZED HEALTH CARE EDUCATION/TRAINING PROGRAM

## 2023-04-19 PROCEDURE — 1101F PT FALLS ASSESS-DOCD LE1/YR: CPT | Mod: CPTII,S$GLB,, | Performed by: STUDENT IN AN ORGANIZED HEALTH CARE EDUCATION/TRAINING PROGRAM

## 2023-04-19 PROCEDURE — 1159F PR MEDICATION LIST DOCUMENTED IN MEDICAL RECORD: ICD-10-PCS | Mod: CPTII,S$GLB,, | Performed by: STUDENT IN AN ORGANIZED HEALTH CARE EDUCATION/TRAINING PROGRAM

## 2023-04-19 PROCEDURE — 4010F ACE/ARB THERAPY RXD/TAKEN: CPT | Mod: CPTII,S$GLB,, | Performed by: STUDENT IN AN ORGANIZED HEALTH CARE EDUCATION/TRAINING PROGRAM

## 2023-04-19 PROCEDURE — 99024 POSTOP FOLLOW-UP VISIT: CPT | Mod: S$GLB,,, | Performed by: STUDENT IN AN ORGANIZED HEALTH CARE EDUCATION/TRAINING PROGRAM

## 2023-04-19 PROCEDURE — 3079F PR MOST RECENT DIASTOLIC BLOOD PRESSURE 80-89 MM HG: ICD-10-PCS | Mod: CPTII,S$GLB,, | Performed by: STUDENT IN AN ORGANIZED HEALTH CARE EDUCATION/TRAINING PROGRAM

## 2023-04-19 PROCEDURE — 3288F PR FALLS RISK ASSESSMENT DOCUMENTED: ICD-10-PCS | Mod: CPTII,S$GLB,, | Performed by: STUDENT IN AN ORGANIZED HEALTH CARE EDUCATION/TRAINING PROGRAM

## 2023-04-19 PROCEDURE — 99999 PR PBB SHADOW E&M-EST. PATIENT-LVL III: CPT | Mod: PBBFAC,,, | Performed by: STUDENT IN AN ORGANIZED HEALTH CARE EDUCATION/TRAINING PROGRAM

## 2023-04-19 PROCEDURE — 99024 PR POST-OP FOLLOW-UP VISIT: ICD-10-PCS | Mod: S$GLB,,, | Performed by: STUDENT IN AN ORGANIZED HEALTH CARE EDUCATION/TRAINING PROGRAM

## 2023-04-19 PROCEDURE — 1159F MED LIST DOCD IN RCRD: CPT | Mod: CPTII,S$GLB,, | Performed by: STUDENT IN AN ORGANIZED HEALTH CARE EDUCATION/TRAINING PROGRAM

## 2023-04-19 PROCEDURE — 1101F PR PT FALLS ASSESS DOC 0-1 FALLS W/OUT INJ PAST YR: ICD-10-PCS | Mod: CPTII,S$GLB,, | Performed by: STUDENT IN AN ORGANIZED HEALTH CARE EDUCATION/TRAINING PROGRAM

## 2023-04-19 PROCEDURE — 1160F PR REVIEW ALL MEDS BY PRESCRIBER/CLIN PHARMACIST DOCUMENTED: ICD-10-PCS | Mod: CPTII,S$GLB,, | Performed by: STUDENT IN AN ORGANIZED HEALTH CARE EDUCATION/TRAINING PROGRAM

## 2023-04-19 PROCEDURE — 3008F PR BODY MASS INDEX (BMI) DOCUMENTED: ICD-10-PCS | Mod: CPTII,S$GLB,, | Performed by: STUDENT IN AN ORGANIZED HEALTH CARE EDUCATION/TRAINING PROGRAM

## 2023-04-19 PROCEDURE — 3079F DIAST BP 80-89 MM HG: CPT | Mod: CPTII,S$GLB,, | Performed by: STUDENT IN AN ORGANIZED HEALTH CARE EDUCATION/TRAINING PROGRAM

## 2023-04-19 PROCEDURE — 1126F AMNT PAIN NOTED NONE PRSNT: CPT | Mod: CPTII,S$GLB,, | Performed by: STUDENT IN AN ORGANIZED HEALTH CARE EDUCATION/TRAINING PROGRAM

## 2023-04-19 PROCEDURE — 3075F SYST BP GE 130 - 139MM HG: CPT | Mod: CPTII,S$GLB,, | Performed by: STUDENT IN AN ORGANIZED HEALTH CARE EDUCATION/TRAINING PROGRAM

## 2023-04-19 PROCEDURE — 4010F PR ACE/ARB THEARPY RXD/TAKEN: ICD-10-PCS | Mod: CPTII,S$GLB,, | Performed by: STUDENT IN AN ORGANIZED HEALTH CARE EDUCATION/TRAINING PROGRAM

## 2023-04-19 PROCEDURE — 1126F PR PAIN SEVERITY QUANTIFIED, NO PAIN PRESENT: ICD-10-PCS | Mod: CPTII,S$GLB,, | Performed by: STUDENT IN AN ORGANIZED HEALTH CARE EDUCATION/TRAINING PROGRAM

## 2023-04-19 PROCEDURE — 3075F PR MOST RECENT SYSTOLIC BLOOD PRESS GE 130-139MM HG: ICD-10-PCS | Mod: CPTII,S$GLB,, | Performed by: STUDENT IN AN ORGANIZED HEALTH CARE EDUCATION/TRAINING PROGRAM

## 2023-04-19 PROCEDURE — 1160F RVW MEDS BY RX/DR IN RCRD: CPT | Mod: CPTII,S$GLB,, | Performed by: STUDENT IN AN ORGANIZED HEALTH CARE EDUCATION/TRAINING PROGRAM

## 2023-04-19 NOTE — PROGRESS NOTES
"[facility]  General Surgery  Post-Operative Note    Subjective:       Francis Weinberg is a 67 year-old man who presents to the clinic 2 weeks following back cysts excision. He is eating a regular diet without difficulty. Bowel movements are  normal .  The patient is not having any pain..      Objective:      /85 (BP Location: Right arm, Patient Position: Sitting)   Pulse 87   Ht 6' 1" (1.854 m)   Wt 92.8 kg (204 lb 7.6 oz)   SpO2 98%   BMI 26.98 kg/m²     General:  alert, appears stated age, and cooperative   Abdomen: soft, bowel sounds active, non-tender   Incision:   healing well, no drainage, no erythema, no hernia, no seroma, no swelling, no dehiscence, incision well approximated      Pathology:    SKIN AND SUBCUTANEOUS TISSUE, RIGHT UPPER BACK, EXCISION:   - Epidermal inclusion cyst.     Assessment:     Francis Weinberg is doing well s/p back cysts excision    Plan:     1. Continue any current medications.  2. Wound care discussed.  3. Pt is to increase activities as tolerated.  4. Follow up as needed      Denise Lin MD  General Surgery and Surgical Critical Care  [facility]  "

## 2023-04-24 ENCOUNTER — PATIENT MESSAGE (OUTPATIENT)
Dept: ADMINISTRATIVE | Facility: HOSPITAL | Age: 68
End: 2023-04-24
Payer: COMMERCIAL

## 2023-04-25 ENCOUNTER — PATIENT OUTREACH (OUTPATIENT)
Dept: ADMINISTRATIVE | Facility: HOSPITAL | Age: 68
End: 2023-04-25
Payer: COMMERCIAL

## 2023-04-25 NOTE — PROGRESS NOTES
Health Maintenance Due   Topic Date Due    PROSTATE-SPECIFIC ANTIGEN  11/17/2022    Diabetes Urine Screening  02/11/2023    Foot Exam  02/11/2023    Lipid Panel  02/24/2023    Hemoglobin A1c  05/01/2023        updated. Triggered LINKS and Care Everywhere. Chart reviewed. Scheduled lab.     Alma Rosa Yee LPN   Clinical Care Coordinator  Primary Care and Wellness

## 2023-06-28 ENCOUNTER — LAB VISIT (OUTPATIENT)
Dept: LAB | Facility: HOSPITAL | Age: 68
End: 2023-06-28
Attending: INTERNAL MEDICINE
Payer: COMMERCIAL

## 2023-06-28 DIAGNOSIS — Z12.5 SCREENING PSA (PROSTATE SPECIFIC ANTIGEN): ICD-10-CM

## 2023-06-28 DIAGNOSIS — E78.5 HYPERLIPIDEMIA, UNSPECIFIED HYPERLIPIDEMIA TYPE: ICD-10-CM

## 2023-06-28 DIAGNOSIS — E11.9 TYPE 2 DIABETES MELLITUS WITHOUT COMPLICATION, WITHOUT LONG-TERM CURRENT USE OF INSULIN: ICD-10-CM

## 2023-06-28 DIAGNOSIS — I10 HYPERTENSION, ESSENTIAL: ICD-10-CM

## 2023-06-28 LAB
ALBUMIN SERPL BCP-MCNC: 4 G/DL (ref 3.5–5.2)
ALP SERPL-CCNC: 138 U/L (ref 55–135)
ALT SERPL W/O P-5'-P-CCNC: 60 U/L (ref 10–44)
ANION GAP SERPL CALC-SCNC: 6 MMOL/L (ref 8–16)
AST SERPL-CCNC: 29 U/L (ref 10–40)
BASOPHILS # BLD AUTO: 0.04 K/UL (ref 0–0.2)
BASOPHILS NFR BLD: 0.6 % (ref 0–1.9)
BILIRUB SERPL-MCNC: 0.3 MG/DL (ref 0.1–1)
BUN SERPL-MCNC: 19 MG/DL (ref 8–23)
CALCIUM SERPL-MCNC: 9.9 MG/DL (ref 8.7–10.5)
CHLORIDE SERPL-SCNC: 104 MMOL/L (ref 95–110)
CHOLEST SERPL-MCNC: 122 MG/DL (ref 120–199)
CHOLEST/HDLC SERPL: 2.9 {RATIO} (ref 2–5)
CO2 SERPL-SCNC: 28 MMOL/L (ref 23–29)
COMPLEXED PSA SERPL-MCNC: 1.3 NG/ML (ref 0–4)
CREAT SERPL-MCNC: 1 MG/DL (ref 0.5–1.4)
DIFFERENTIAL METHOD: ABNORMAL
EOSINOPHIL # BLD AUTO: 0.1 K/UL (ref 0–0.5)
EOSINOPHIL NFR BLD: 1.9 % (ref 0–8)
ERYTHROCYTE [DISTWIDTH] IN BLOOD BY AUTOMATED COUNT: 15.4 % (ref 11.5–14.5)
EST. GFR  (NO RACE VARIABLE): >60 ML/MIN/1.73 M^2
ESTIMATED AVG GLUCOSE: 131 MG/DL (ref 68–131)
GLUCOSE SERPL-MCNC: 111 MG/DL (ref 70–110)
HBA1C MFR BLD: 6.2 % (ref 4–5.6)
HCT VFR BLD AUTO: 46.2 % (ref 40–54)
HDLC SERPL-MCNC: 42 MG/DL (ref 40–75)
HDLC SERPL: 34.4 % (ref 20–50)
HGB BLD-MCNC: 13.7 G/DL (ref 14–18)
IMM GRANULOCYTES # BLD AUTO: 0.01 K/UL (ref 0–0.04)
IMM GRANULOCYTES NFR BLD AUTO: 0.2 % (ref 0–0.5)
LDLC SERPL CALC-MCNC: 66.6 MG/DL (ref 63–159)
LYMPHOCYTES # BLD AUTO: 1.5 K/UL (ref 1–4.8)
LYMPHOCYTES NFR BLD: 24.7 % (ref 18–48)
MCH RBC QN AUTO: 23.1 PG (ref 27–31)
MCHC RBC AUTO-ENTMCNC: 29.7 G/DL (ref 32–36)
MCV RBC AUTO: 78 FL (ref 82–98)
MONOCYTES # BLD AUTO: 0.4 K/UL (ref 0.3–1)
MONOCYTES NFR BLD: 7.1 % (ref 4–15)
NEUTROPHILS # BLD AUTO: 4 K/UL (ref 1.8–7.7)
NEUTROPHILS NFR BLD: 65.5 % (ref 38–73)
NONHDLC SERPL-MCNC: 80 MG/DL
NRBC BLD-RTO: 0 /100 WBC
PLATELET # BLD AUTO: 238 K/UL (ref 150–450)
PMV BLD AUTO: 12 FL (ref 9.2–12.9)
POTASSIUM SERPL-SCNC: 4.5 MMOL/L (ref 3.5–5.1)
PROT SERPL-MCNC: 7.9 G/DL (ref 6–8.4)
RBC # BLD AUTO: 5.94 M/UL (ref 4.6–6.2)
SODIUM SERPL-SCNC: 138 MMOL/L (ref 136–145)
TRIGL SERPL-MCNC: 67 MG/DL (ref 30–150)
TSH SERPL DL<=0.005 MIU/L-ACNC: 1.24 UIU/ML (ref 0.4–4)
WBC # BLD AUTO: 6.16 K/UL (ref 3.9–12.7)

## 2023-06-28 PROCEDURE — 84153 ASSAY OF PSA TOTAL: CPT | Performed by: PHYSICIAN ASSISTANT

## 2023-06-28 PROCEDURE — 80061 LIPID PANEL: CPT | Performed by: PHYSICIAN ASSISTANT

## 2023-06-28 PROCEDURE — 80053 COMPREHEN METABOLIC PANEL: CPT | Performed by: PHYSICIAN ASSISTANT

## 2023-06-28 PROCEDURE — 83036 HEMOGLOBIN GLYCOSYLATED A1C: CPT | Performed by: PHYSICIAN ASSISTANT

## 2023-06-28 PROCEDURE — 36415 COLL VENOUS BLD VENIPUNCTURE: CPT | Performed by: PHYSICIAN ASSISTANT

## 2023-06-28 PROCEDURE — 84443 ASSAY THYROID STIM HORMONE: CPT | Performed by: PHYSICIAN ASSISTANT

## 2023-06-28 PROCEDURE — 85025 COMPLETE CBC W/AUTO DIFF WBC: CPT | Performed by: PHYSICIAN ASSISTANT

## 2023-07-11 ENCOUNTER — TELEPHONE (OUTPATIENT)
Dept: INTERNAL MEDICINE | Facility: CLINIC | Age: 68
End: 2023-07-11
Payer: COMMERCIAL

## 2023-07-11 DIAGNOSIS — R74.8 ELEVATED LIVER ENZYMES: Primary | ICD-10-CM

## 2023-07-11 NOTE — TELEPHONE ENCOUNTER
----- Message from Irene Napier PA-C sent at 7/11/2023  6:16 AM CDT -----  Please let patient know he needs repeat liver enzymes before f/u with Dr. Olson next month  Labs ordered, please schedule

## 2023-08-04 ENCOUNTER — LAB VISIT (OUTPATIENT)
Dept: LAB | Facility: HOSPITAL | Age: 68
End: 2023-08-04
Attending: INTERNAL MEDICINE
Payer: COMMERCIAL

## 2023-08-04 DIAGNOSIS — R74.8 ELEVATED LIVER ENZYMES: ICD-10-CM

## 2023-08-04 LAB
ALBUMIN SERPL BCP-MCNC: 3.9 G/DL (ref 3.5–5.2)
ALP SERPL-CCNC: 114 U/L (ref 55–135)
ALT SERPL W/O P-5'-P-CCNC: 39 U/L (ref 10–44)
AST SERPL-CCNC: 26 U/L (ref 10–40)
BILIRUB DIRECT SERPL-MCNC: 0.2 MG/DL (ref 0.1–0.3)
BILIRUB SERPL-MCNC: 0.5 MG/DL (ref 0.1–1)
PROT SERPL-MCNC: 7.3 G/DL (ref 6–8.4)

## 2023-08-04 PROCEDURE — 36415 COLL VENOUS BLD VENIPUNCTURE: CPT | Performed by: PHYSICIAN ASSISTANT

## 2023-08-04 PROCEDURE — 80076 HEPATIC FUNCTION PANEL: CPT | Performed by: PHYSICIAN ASSISTANT

## 2023-08-09 ENCOUNTER — OFFICE VISIT (OUTPATIENT)
Dept: INTERNAL MEDICINE | Facility: CLINIC | Age: 68
End: 2023-08-09
Payer: COMMERCIAL

## 2023-08-09 VITALS
DIASTOLIC BLOOD PRESSURE: 87 MMHG | BODY MASS INDEX: 26.95 KG/M2 | HEART RATE: 88 BPM | OXYGEN SATURATION: 95 % | WEIGHT: 203.38 LBS | SYSTOLIC BLOOD PRESSURE: 134 MMHG | HEIGHT: 73 IN

## 2023-08-09 DIAGNOSIS — Z00.00 HEALTH CARE MAINTENANCE: Primary | ICD-10-CM

## 2023-08-09 DIAGNOSIS — N52.01 ERECTILE DYSFUNCTION DUE TO ARTERIAL INSUFFICIENCY: ICD-10-CM

## 2023-08-09 DIAGNOSIS — E11.9 TYPE 2 DIABETES MELLITUS WITHOUT COMPLICATION, WITHOUT LONG-TERM CURRENT USE OF INSULIN: ICD-10-CM

## 2023-08-09 DIAGNOSIS — I10 ESSENTIAL HYPERTENSION: ICD-10-CM

## 2023-08-09 DIAGNOSIS — R74.01 ELEVATED ALT MEASUREMENT: ICD-10-CM

## 2023-08-09 PROCEDURE — 3008F BODY MASS INDEX DOCD: CPT | Mod: CPTII,S$GLB,, | Performed by: INTERNAL MEDICINE

## 2023-08-09 PROCEDURE — 3288F FALL RISK ASSESSMENT DOCD: CPT | Mod: CPTII,S$GLB,, | Performed by: INTERNAL MEDICINE

## 2023-08-09 PROCEDURE — 1159F MED LIST DOCD IN RCRD: CPT | Mod: CPTII,S$GLB,, | Performed by: INTERNAL MEDICINE

## 2023-08-09 PROCEDURE — 1101F PR PT FALLS ASSESS DOC 0-1 FALLS W/OUT INJ PAST YR: ICD-10-PCS | Mod: CPTII,S$GLB,, | Performed by: INTERNAL MEDICINE

## 2023-08-09 PROCEDURE — 1160F RVW MEDS BY RX/DR IN RCRD: CPT | Mod: CPTII,S$GLB,, | Performed by: INTERNAL MEDICINE

## 2023-08-09 PROCEDURE — 4010F ACE/ARB THERAPY RXD/TAKEN: CPT | Mod: CPTII,S$GLB,, | Performed by: INTERNAL MEDICINE

## 2023-08-09 PROCEDURE — 1126F PR PAIN SEVERITY QUANTIFIED, NO PAIN PRESENT: ICD-10-PCS | Mod: CPTII,S$GLB,, | Performed by: INTERNAL MEDICINE

## 2023-08-09 PROCEDURE — 1126F AMNT PAIN NOTED NONE PRSNT: CPT | Mod: CPTII,S$GLB,, | Performed by: INTERNAL MEDICINE

## 2023-08-09 PROCEDURE — 99999 PR PBB SHADOW E&M-EST. PATIENT-LVL IV: CPT | Mod: PBBFAC,,, | Performed by: INTERNAL MEDICINE

## 2023-08-09 PROCEDURE — 4010F PR ACE/ARB THEARPY RXD/TAKEN: ICD-10-PCS | Mod: CPTII,S$GLB,, | Performed by: INTERNAL MEDICINE

## 2023-08-09 PROCEDURE — 99999 PR PBB SHADOW E&M-EST. PATIENT-LVL IV: ICD-10-PCS | Mod: PBBFAC,,, | Performed by: INTERNAL MEDICINE

## 2023-08-09 PROCEDURE — 3288F PR FALLS RISK ASSESSMENT DOCUMENTED: ICD-10-PCS | Mod: CPTII,S$GLB,, | Performed by: INTERNAL MEDICINE

## 2023-08-09 PROCEDURE — 3066F PR DOCUMENTATION OF TREATMENT FOR NEPHROPATHY: ICD-10-PCS | Mod: CPTII,S$GLB,, | Performed by: INTERNAL MEDICINE

## 2023-08-09 PROCEDURE — 3079F DIAST BP 80-89 MM HG: CPT | Mod: CPTII,S$GLB,, | Performed by: INTERNAL MEDICINE

## 2023-08-09 PROCEDURE — 3044F HG A1C LEVEL LT 7.0%: CPT | Mod: CPTII,S$GLB,, | Performed by: INTERNAL MEDICINE

## 2023-08-09 PROCEDURE — 3044F PR MOST RECENT HEMOGLOBIN A1C LEVEL <7.0%: ICD-10-PCS | Mod: CPTII,S$GLB,, | Performed by: INTERNAL MEDICINE

## 2023-08-09 PROCEDURE — 99397 PER PM REEVAL EST PAT 65+ YR: CPT | Mod: S$GLB,,, | Performed by: INTERNAL MEDICINE

## 2023-08-09 PROCEDURE — 3061F PR NEG MICROALBUMINURIA RESULT DOCUMENTED/REVIEW: ICD-10-PCS | Mod: CPTII,S$GLB,, | Performed by: INTERNAL MEDICINE

## 2023-08-09 PROCEDURE — 1101F PT FALLS ASSESS-DOCD LE1/YR: CPT | Mod: CPTII,S$GLB,, | Performed by: INTERNAL MEDICINE

## 2023-08-09 PROCEDURE — 3066F NEPHROPATHY DOC TX: CPT | Mod: CPTII,S$GLB,, | Performed by: INTERNAL MEDICINE

## 2023-08-09 PROCEDURE — 1159F PR MEDICATION LIST DOCUMENTED IN MEDICAL RECORD: ICD-10-PCS | Mod: CPTII,S$GLB,, | Performed by: INTERNAL MEDICINE

## 2023-08-09 PROCEDURE — 1160F PR REVIEW ALL MEDS BY PRESCRIBER/CLIN PHARMACIST DOCUMENTED: ICD-10-PCS | Mod: CPTII,S$GLB,, | Performed by: INTERNAL MEDICINE

## 2023-08-09 PROCEDURE — 99397 PR PREVENTIVE VISIT,EST,65 & OVER: ICD-10-PCS | Mod: S$GLB,,, | Performed by: INTERNAL MEDICINE

## 2023-08-09 PROCEDURE — 3075F SYST BP GE 130 - 139MM HG: CPT | Mod: CPTII,S$GLB,, | Performed by: INTERNAL MEDICINE

## 2023-08-09 PROCEDURE — 3061F NEG MICROALBUMINURIA REV: CPT | Mod: CPTII,S$GLB,, | Performed by: INTERNAL MEDICINE

## 2023-08-09 PROCEDURE — 3008F PR BODY MASS INDEX (BMI) DOCUMENTED: ICD-10-PCS | Mod: CPTII,S$GLB,, | Performed by: INTERNAL MEDICINE

## 2023-08-09 PROCEDURE — 3079F PR MOST RECENT DIASTOLIC BLOOD PRESSURE 80-89 MM HG: ICD-10-PCS | Mod: CPTII,S$GLB,, | Performed by: INTERNAL MEDICINE

## 2023-08-09 PROCEDURE — 3075F PR MOST RECENT SYSTOLIC BLOOD PRESS GE 130-139MM HG: ICD-10-PCS | Mod: CPTII,S$GLB,, | Performed by: INTERNAL MEDICINE

## 2023-08-09 RX ORDER — TADALAFIL 20 MG/1
20 TABLET ORAL DAILY PRN
Qty: 6 TABLET | Refills: 11 | Status: SHIPPED | OUTPATIENT
Start: 2023-08-09 | End: 2024-02-15 | Stop reason: SDUPTHER

## 2023-08-09 NOTE — MEDICAL/APP STUDENT
Subjective     Patient ID: Francis Weinberg is a 67 y.o. male.    Chief Complaint: Follow-up (3 mth follow up )    Francis Weinberg is a 68 yo M who is here for f/u due to elevated LFTs. His ALT was elevate at his appointment in June and repeat lab work has normalized. He takes his blood pressure 3x daily and will very infrequently miss his amlodipine if his BP is <130 systolic. Otherwise he is compliant with his medication. No physical complaints, no headache, fever, SOB, chest pain, abdominal pain. He states he exercises at least 3 times a week and is retired.       Review of Systems   Constitutional:  Negative for fever and unexpected weight change.   Respiratory:  Negative for shortness of breath.    Cardiovascular:  Negative for chest pain.   Gastrointestinal:  Negative for abdominal pain.   Neurological:  Negative for headaches.          Objective     Physical Exam  Vitals and nursing note reviewed.   Constitutional:       Appearance: Normal appearance. He is normal weight.   HENT:      Head: Normocephalic and atraumatic.      Nose: Nose normal.   Eyes:      Extraocular Movements: Extraocular movements intact.      Pupils: Pupils are equal, round, and reactive to light.   Cardiovascular:      Rate and Rhythm: Normal rate and regular rhythm.   Pulmonary:      Effort: Pulmonary effort is normal.      Breath sounds: Normal breath sounds.   Abdominal:      General: Bowel sounds are normal.      Palpations: Abdomen is soft.   Skin:     General: Skin is warm.      Capillary Refill: Capillary refill takes less than 2 seconds.   Neurological:      General: No focal deficit present.      Mental Status: He is alert and oriented to person, place, and time.   Psychiatric:         Mood and Affect: Mood normal.         Behavior: Behavior normal.            Assessment and Plan     1. Essential hypertension  -     Comprehensive Metabolic Panel; Future; Expected date: 02/09/2024    2. Type 2 diabetes mellitus without complication,  without long-term current use of insulin  -     Comprehensive Metabolic Panel; Future; Expected date: 02/09/2024  -     Hemoglobin A1C; Future; Expected date: 02/05/2024    3. Elevated ALT measurement  -     Comprehensive Metabolic Panel; Future; Expected date: 02/09/2024    4. Erectile dysfunction due to arterial insufficiency  -     tadalafiL (CIALIS) 20 MG Tab; Take 1 tablet (20 mg total) by mouth daily as needed (erectile dysfunction).  Dispense: 6 tablet; Refill: 11             Follow up in about 6 months (around 2/9/2024).        Dee Varner  Medical Student MS-4

## 2023-08-09 NOTE — PROGRESS NOTES
Subjective      Patient ID: Francis Weinberg is a 67 y.o. male.     Chief Complaint: Follow-up (3 mth follow up )     Francis Weinberg is a 68 yo M who is here for f/u due to elevated LFTs. His ALT was elevate at his appointment in June and repeat lab work has normalized. He takes his blood pressure 3x daily and will very infrequently miss his amlodipine if his BP is <130 systolic. Otherwise he is compliant with his medication. No physical complaints, no headache, fever, SOB, chest pain, abdominal pain. He states he exercises at least 3 times a week and is retired.         Review of Systems   Constitutional:  Negative for fever and unexpected weight change.   Respiratory:  Negative for shortness of breath.    Cardiovascular:  Negative for chest pain.   Gastrointestinal:  Negative for abdominal pain.   Neurological:  Negative for headaches.         Objective      Physical Exam  Vitals and nursing note reviewed.   Constitutional:       Appearance: Normal appearance. He is normal weight.   HENT:      Head: Normocephalic and atraumatic.      Nose: Nose normal.   Eyes:      Extraocular Movements: Extraocular movements intact.      Pupils: Pupils are equal, round, and reactive to light.   Cardiovascular:      Rate and Rhythm: Normal rate and regular rhythm.   Pulmonary:      Effort: Pulmonary effort is normal.      Breath sounds: Normal breath sounds.   Abdominal:      General: Bowel sounds are normal.      Palpations: Abdomen is soft.   Skin:     General: Skin is warm.      Capillary Refill: Capillary refill takes less than 2 seconds.   Neurological:      General: No focal deficit present.      Mental Status: He is alert and oriented to person, place, and time.   Psychiatric:         Mood and Affect: Mood normal.         Behavior: Behavior normal.                     Assessment and Plan      1. Essential hypertension  -     Comprehensive Metabolic Panel; Future; Expected date: 02/09/2024     2. Type 2 diabetes mellitus without  complication, without long-term current use of insulin  -     Comprehensive Metabolic Panel; Future; Expected date: 02/09/2024  -     Hemoglobin A1C; Future; Expected date: 02/05/2024     3. Elevated ALT measurement  -     Comprehensive Metabolic Panel; Future; Expected date: 02/09/2024     4. Erectile dysfunction due to arterial insufficiency  -     tadalafiL (CIALIS) 20 MG Tab; Take 1 tablet (20 mg total) by mouth daily as needed (erectile dysfunction).  Dispense: 6 tablet; Refill: 11              Follow up in about 6 months (around 2/9/2024).           Dee Varner  Medical Student MS-4    -------------------  Addendum    HCM  Foot exam today    Elevated liver enzymes; ALT to 60  Improved on repeat    DM2- diet controlled  A1c 6.2% June '23    HTN  Amlodipine 10mg - occasionally does not take if BP low  Valsartan 320mg    Foot exam today  Eye exam outside of [facility] Dr. Holman [phone]    Protective Sensation (w/ 10 gram monofilament):  Right: Intact  Left: Intact    Visual Inspection:  Normal -  Bilateral    Pedal Pulses:   Right: Present  Left: Present    Posterior tibialis:   Right:Present  Left: Present    I hereby acknowledge that I am relying upon documentation authored by a medical student working under my supervision and further I hereby attest that I have verified the student documentation or findings by personally re-performing the physical exam and medical decision making activities of the Evaluation and Management service to be billed.  Latoya Olson

## 2023-09-01 DIAGNOSIS — I10 HYPERTENSION, ESSENTIAL: ICD-10-CM

## 2023-09-01 DIAGNOSIS — E11.9 TYPE 2 DIABETES MELLITUS WITHOUT COMPLICATION, WITHOUT LONG-TERM CURRENT USE OF INSULIN: ICD-10-CM

## 2023-09-01 DIAGNOSIS — I10 ESSENTIAL HYPERTENSION: ICD-10-CM

## 2023-09-01 RX ORDER — ATORVASTATIN CALCIUM 40 MG/1
40 TABLET, FILM COATED ORAL NIGHTLY
Qty: 90 TABLET | Refills: 3 | Status: SHIPPED | OUTPATIENT
Start: 2023-09-01

## 2023-09-01 RX ORDER — VALSARTAN 320 MG/1
320 TABLET ORAL
Qty: 90 TABLET | Refills: 3 | Status: SHIPPED | OUTPATIENT
Start: 2023-09-01 | End: 2024-02-15 | Stop reason: SDUPTHER

## 2023-09-01 NOTE — TELEPHONE ENCOUNTER
Refill Decision Note   Francis Weinberg  is requesting a refill authorization.  Brief Assessment and Rationale for Refill:  Approve     Medication Therapy Plan:       Medication Reconciliation Completed: No   Comments:     No Care Gaps recommended.     Note composed:7:35 AM 09/01/2023

## 2023-09-01 NOTE — TELEPHONE ENCOUNTER
No care due was identified.  [facility] Embedded Care Due Messages. Reference number: [id number].   9/01/2023 12:18:49 AM CDT

## 2023-09-12 ENCOUNTER — IMMUNIZATION (OUTPATIENT)
Dept: INTERNAL MEDICINE | Facility: CLINIC | Age: 68
End: 2023-09-12
Payer: COMMERCIAL

## 2023-09-12 ENCOUNTER — OFFICE VISIT (OUTPATIENT)
Dept: INTERNAL MEDICINE | Facility: CLINIC | Age: 68
End: 2023-09-12
Payer: COMMERCIAL

## 2023-09-12 VITALS
HEART RATE: 73 BPM | OXYGEN SATURATION: 99 % | WEIGHT: 209.44 LBS | SYSTOLIC BLOOD PRESSURE: 142 MMHG | BODY MASS INDEX: 27.76 KG/M2 | DIASTOLIC BLOOD PRESSURE: 90 MMHG | HEIGHT: 73 IN

## 2023-09-12 DIAGNOSIS — D56.9 THALASSEMIA, UNSPECIFIED TYPE: ICD-10-CM

## 2023-09-12 DIAGNOSIS — E11.9 TYPE 2 DIABETES MELLITUS WITHOUT COMPLICATION, WITHOUT LONG-TERM CURRENT USE OF INSULIN: ICD-10-CM

## 2023-09-12 DIAGNOSIS — N13.8 BPH WITH URINARY OBSTRUCTION: ICD-10-CM

## 2023-09-12 DIAGNOSIS — I10 ESSENTIAL HYPERTENSION: ICD-10-CM

## 2023-09-12 DIAGNOSIS — Z00.00 ENCOUNTER FOR MEDICARE ANNUAL WELLNESS EXAM: Primary | ICD-10-CM

## 2023-09-12 DIAGNOSIS — N40.1 BPH WITH URINARY OBSTRUCTION: ICD-10-CM

## 2023-09-12 DIAGNOSIS — N52.01 ERECTILE DYSFUNCTION DUE TO ARTERIAL INSUFFICIENCY: ICD-10-CM

## 2023-09-12 PROCEDURE — 3044F HG A1C LEVEL LT 7.0%: CPT | Mod: CPTII,S$GLB,, | Performed by: PHYSICIAN ASSISTANT

## 2023-09-12 PROCEDURE — 1160F PR REVIEW ALL MEDS BY PRESCRIBER/CLIN PHARMACIST DOCUMENTED: ICD-10-PCS | Mod: CPTII,S$GLB,, | Performed by: PHYSICIAN ASSISTANT

## 2023-09-12 PROCEDURE — 1170F FXNL STATUS ASSESSED: CPT | Mod: CPTII,S$GLB,, | Performed by: PHYSICIAN ASSISTANT

## 2023-09-12 PROCEDURE — 1101F PT FALLS ASSESS-DOCD LE1/YR: CPT | Mod: CPTII,S$GLB,, | Performed by: PHYSICIAN ASSISTANT

## 2023-09-12 PROCEDURE — 3288F PR FALLS RISK ASSESSMENT DOCUMENTED: ICD-10-PCS | Mod: CPTII,S$GLB,, | Performed by: PHYSICIAN ASSISTANT

## 2023-09-12 PROCEDURE — 1159F PR MEDICATION LIST DOCUMENTED IN MEDICAL RECORD: ICD-10-PCS | Mod: CPTII,S$GLB,, | Performed by: PHYSICIAN ASSISTANT

## 2023-09-12 PROCEDURE — 3080F PR MOST RECENT DIASTOLIC BLOOD PRESSURE >= 90 MM HG: ICD-10-PCS | Mod: CPTII,S$GLB,, | Performed by: PHYSICIAN ASSISTANT

## 2023-09-12 PROCEDURE — 3061F NEG MICROALBUMINURIA REV: CPT | Mod: CPTII,S$GLB,, | Performed by: PHYSICIAN ASSISTANT

## 2023-09-12 PROCEDURE — 3066F NEPHROPATHY DOC TX: CPT | Mod: CPTII,S$GLB,, | Performed by: PHYSICIAN ASSISTANT

## 2023-09-12 PROCEDURE — 99999 PR PBB SHADOW E&M-EST. PATIENT-LVL V: ICD-10-PCS | Mod: PBBFAC,,, | Performed by: PHYSICIAN ASSISTANT

## 2023-09-12 PROCEDURE — 3080F DIAST BP >= 90 MM HG: CPT | Mod: CPTII,S$GLB,, | Performed by: PHYSICIAN ASSISTANT

## 2023-09-12 PROCEDURE — 3061F PR NEG MICROALBUMINURIA RESULT DOCUMENTED/REVIEW: ICD-10-PCS | Mod: CPTII,S$GLB,, | Performed by: PHYSICIAN ASSISTANT

## 2023-09-12 PROCEDURE — 90471 IMMUNIZATION ADMIN: CPT | Mod: S$GLB,,, | Performed by: INTERNAL MEDICINE

## 2023-09-12 PROCEDURE — 3044F PR MOST RECENT HEMOGLOBIN A1C LEVEL <7.0%: ICD-10-PCS | Mod: CPTII,S$GLB,, | Performed by: PHYSICIAN ASSISTANT

## 2023-09-12 PROCEDURE — 90694 VACC AIIV4 NO PRSRV 0.5ML IM: CPT | Mod: S$GLB,,, | Performed by: INTERNAL MEDICINE

## 2023-09-12 PROCEDURE — 90694 FLU VACCINE - QUADRIVALENT - ADJUVANTED: ICD-10-PCS | Mod: S$GLB,,, | Performed by: INTERNAL MEDICINE

## 2023-09-12 PROCEDURE — 90471 FLU VACCINE - QUADRIVALENT - ADJUVANTED: ICD-10-PCS | Mod: S$GLB,,, | Performed by: INTERNAL MEDICINE

## 2023-09-12 PROCEDURE — 3288F FALL RISK ASSESSMENT DOCD: CPT | Mod: CPTII,S$GLB,, | Performed by: PHYSICIAN ASSISTANT

## 2023-09-12 PROCEDURE — 1101F PR PT FALLS ASSESS DOC 0-1 FALLS W/OUT INJ PAST YR: ICD-10-PCS | Mod: CPTII,S$GLB,, | Performed by: PHYSICIAN ASSISTANT

## 2023-09-12 PROCEDURE — 1159F MED LIST DOCD IN RCRD: CPT | Mod: CPTII,S$GLB,, | Performed by: PHYSICIAN ASSISTANT

## 2023-09-12 PROCEDURE — 3077F PR MOST RECENT SYSTOLIC BLOOD PRESSURE >= 140 MM HG: ICD-10-PCS | Mod: CPTII,S$GLB,, | Performed by: PHYSICIAN ASSISTANT

## 2023-09-12 PROCEDURE — 3066F PR DOCUMENTATION OF TREATMENT FOR NEPHROPATHY: ICD-10-PCS | Mod: CPTII,S$GLB,, | Performed by: PHYSICIAN ASSISTANT

## 2023-09-12 PROCEDURE — 3008F BODY MASS INDEX DOCD: CPT | Mod: CPTII,S$GLB,, | Performed by: PHYSICIAN ASSISTANT

## 2023-09-12 PROCEDURE — 3077F SYST BP >= 140 MM HG: CPT | Mod: CPTII,S$GLB,, | Performed by: PHYSICIAN ASSISTANT

## 2023-09-12 PROCEDURE — 4010F ACE/ARB THERAPY RXD/TAKEN: CPT | Mod: CPTII,S$GLB,, | Performed by: PHYSICIAN ASSISTANT

## 2023-09-12 PROCEDURE — 3008F PR BODY MASS INDEX (BMI) DOCUMENTED: ICD-10-PCS | Mod: CPTII,S$GLB,, | Performed by: PHYSICIAN ASSISTANT

## 2023-09-12 PROCEDURE — 1170F PR FUNCTIONAL STATUS ASSESSED: ICD-10-PCS | Mod: CPTII,S$GLB,, | Performed by: PHYSICIAN ASSISTANT

## 2023-09-12 PROCEDURE — G0439 PPPS, SUBSEQ VISIT: HCPCS | Mod: S$GLB,,, | Performed by: PHYSICIAN ASSISTANT

## 2023-09-12 PROCEDURE — G0439 PR MEDICARE ANNUAL WELLNESS SUBSEQUENT VISIT: ICD-10-PCS | Mod: S$GLB,,, | Performed by: PHYSICIAN ASSISTANT

## 2023-09-12 PROCEDURE — 1160F RVW MEDS BY RX/DR IN RCRD: CPT | Mod: CPTII,S$GLB,, | Performed by: PHYSICIAN ASSISTANT

## 2023-09-12 PROCEDURE — 99999 PR PBB SHADOW E&M-EST. PATIENT-LVL V: CPT | Mod: PBBFAC,,, | Performed by: PHYSICIAN ASSISTANT

## 2023-09-12 PROCEDURE — 4010F PR ACE/ARB THEARPY RXD/TAKEN: ICD-10-PCS | Mod: CPTII,S$GLB,, | Performed by: PHYSICIAN ASSISTANT

## 2023-09-12 NOTE — PROGRESS NOTES
"Francis Weinberg presented for a  Medicare AWV and comprehensive Health Risk Assessment today. The following components were reviewed and updated:    Medical history  Family History  Social history  Allergies and Current Medications  Health Risk Assessment  Health Maintenance  Care Team         ** See Completed Assessments for Annual Wellness Visit within the encounter summary.**         The following assessments were completed:  Living Situation  CAGE  Depression Screening  Timed Get Up and Go  Whisper Test  Cognitive Function Screening      Nutrition Screening  ADL Screening  PAQ Screening        Vitals:    09/12/23 0819 09/12/23 1142   BP: (!) 158/92 (!) 142/90   BP Location: Left arm    Patient Position: Sitting    BP Method: Medium (Manual)    Pulse: 73    SpO2: 99%    Weight: 95 kg (209 lb 7 oz)    Height: 6' 1" (1.854 m)      Body mass index is 27.63 kg/m².  Physical Exam  Vitals reviewed.   Constitutional:       General: He is not in acute distress.     Appearance: He is well-developed. He is not ill-appearing.   HENT:      Head: Normocephalic and atraumatic.      Right Ear: Tympanic membrane, ear canal and external ear normal.      Left Ear: Tympanic membrane, ear canal and external ear normal.   Cardiovascular:      Rate and Rhythm: Normal rate and regular rhythm.      Heart sounds: No murmur heard.  Pulmonary:      Effort: Pulmonary effort is normal.      Breath sounds: Normal breath sounds. No wheezing or rales.   Musculoskeletal:      Right lower leg: No edema.      Left lower leg: No edema.   Skin:     General: Skin is warm and dry.      Findings: No rash.   Neurological:      Mental Status: He is alert and oriented to person, place, and time.   Psychiatric:         Mood and Affect: Mood normal.               Diagnoses and health risks identified today and associated recommendations/orders:    1. Encounter for Medicare annual wellness exam  Exam and Assessments performed  Chart Review Complete  Health " Maintenance Reviewed and updated  - Ambulatory Referral/Consult to Enhanced Annual Wellness Visit (eAWV)    2. Type 2 diabetes mellitus without complication, without long-term current use of insulin  Lab Results   Component Value Date    HGBA1C 6.2 (H) 06/28/2023   Stable with lifestyle mods  Followed by PCP      3. Essential hypertension  Slight elevation today  Did not take all meds this am  Discussed BP goals and med adherence  Keep PCP f/u    4. Erectile dysfunction due to arterial insufficiency  Stable on current meds  Followed by PCP    5. BPH with urinary obstruction  Stable on current meds   Followed by PCP    6. Thalassemia, unspecified type  Stable  Followed by PCP      Provided Francis with a 5-10 year written screening schedule and personal prevention plan. Recommendations were developed using the USPSTF age appropriate recommendations. Education, counseling, and referrals were provided as needed. After Visit Summary printed and given to patient which includes a list of additional screenings\tests needed.    Follow up in about 5 months (around 2/15/2024) for PCP follow up and 1 year for next Medicare AWV.    Shandra Jacobs PA-C    Future Appointments   Date Time Provider Department Center   2/8/2024 10:30 AM [facility]   2/15/2024 10:30 AM Latoya Olson MD [facility]

## 2023-09-12 NOTE — PATIENT INSTRUCTIONS
Counseling and Referral of Other Preventative  (Italic type indicates deductible and co-insurance are waived)    Patient Name: Francis Weinberg  Today's Date: 9/12/2023    Health Maintenance       Date Due Completion Date    COVID-19 Vaccine (6 - Pfizer series) 03/01/2023 11/1/2022    Influenza Vaccine (1) 09/01/2023 11/1/2022    Eye Exam 10/27/2023 10/27/2022    Hemoglobin A1c 12/28/2023 6/28/2023    PROSTATE-SPECIFIC ANTIGEN 06/28/2024 6/28/2023    Diabetes Urine Screening 06/28/2024 6/28/2023    Lipid Panel 06/28/2024 6/28/2023    Foot Exam 08/09/2024 8/9/2023 (Done)    Override on 8/9/2023: Done    Override on 2/11/2022: Done    Low Dose Statin 09/01/2024 9/1/2023    TETANUS VACCINE 09/25/2024 9/25/2014    Colorectal Cancer Screening 01/07/2030 1/7/2020    Override on 9/3/2012: Done (done in [place], pt reports was normal, repeat 2022, will try to locate records)        No orders of the defined types were placed in this encounter.      The following information is provided to all patients.  This information is to help you find resources for any of the problems found today that may be affecting your health:                Living healthy guide: www.[facility].[place].gov      Understanding Diabetes: www.diabetes.org      Eating healthy: www.cdc.gov/healthyweight      CDC home safety checklist: www.cdc.gov/steadi/patient.html      Agency on Aging: www.goea.[place].[facility]      Alcoholics anonymous (AA): www.aa.org      Physical Activity: www.jameson.nih.gov/ep3xevp      Tobacco use: www.quitwithusla.org

## 2023-11-29 ENCOUNTER — PATIENT MESSAGE (OUTPATIENT)
Dept: INTERNAL MEDICINE | Facility: CLINIC | Age: 68
End: 2023-11-29
Payer: COMMERCIAL

## 2024-01-02 ENCOUNTER — IMMUNIZATION (OUTPATIENT)
Dept: INTERNAL MEDICINE | Facility: CLINIC | Age: 69
End: 2024-01-02
Payer: COMMERCIAL

## 2024-01-02 DIAGNOSIS — Z23 NEED FOR VACCINATION: Primary | ICD-10-CM

## 2024-01-02 PROCEDURE — 90480 ADMN SARSCOV2 VAC 1/ONLY CMP: CPT | Mod: S$GLB,,, | Performed by: INTERNAL MEDICINE

## 2024-01-02 PROCEDURE — 91322 SARSCOV2 VAC 50 MCG/0.5ML IM: CPT | Mod: S$GLB,,, | Performed by: INTERNAL MEDICINE

## 2024-02-08 ENCOUNTER — LAB VISIT (OUTPATIENT)
Dept: LAB | Facility: HOSPITAL | Age: 69
End: 2024-02-08
Attending: INTERNAL MEDICINE
Payer: MEDICARE

## 2024-02-08 DIAGNOSIS — R74.01 ELEVATED ALT MEASUREMENT: ICD-10-CM

## 2024-02-08 DIAGNOSIS — I10 ESSENTIAL HYPERTENSION: ICD-10-CM

## 2024-02-08 DIAGNOSIS — E11.9 TYPE 2 DIABETES MELLITUS WITHOUT COMPLICATION, WITHOUT LONG-TERM CURRENT USE OF INSULIN: ICD-10-CM

## 2024-02-08 LAB
ALBUMIN SERPL BCP-MCNC: 3.9 G/DL (ref 3.5–5.2)
ALP SERPL-CCNC: 112 U/L (ref 55–135)
ALT SERPL W/O P-5'-P-CCNC: 42 U/L (ref 10–44)
ANION GAP SERPL CALC-SCNC: 9 MMOL/L (ref 8–16)
AST SERPL-CCNC: 28 U/L (ref 10–40)
BILIRUB SERPL-MCNC: 0.5 MG/DL (ref 0.1–1)
BUN SERPL-MCNC: 21 MG/DL (ref 8–23)
CALCIUM SERPL-MCNC: 10.2 MG/DL (ref 8.7–10.5)
CHLORIDE SERPL-SCNC: 108 MMOL/L (ref 95–110)
CO2 SERPL-SCNC: 26 MMOL/L (ref 23–29)
CREAT SERPL-MCNC: 1.1 MG/DL (ref 0.5–1.4)
EST. GFR  (NO RACE VARIABLE): >60 ML/MIN/1.73 M^2
ESTIMATED AVG GLUCOSE: 137 MG/DL (ref 68–131)
GLUCOSE SERPL-MCNC: 111 MG/DL (ref 70–110)
HBA1C MFR BLD: 6.4 % (ref 4–5.6)
POTASSIUM SERPL-SCNC: 4.1 MMOL/L (ref 3.5–5.1)
PROT SERPL-MCNC: 7.5 G/DL (ref 6–8.4)
SODIUM SERPL-SCNC: 143 MMOL/L (ref 136–145)

## 2024-02-08 PROCEDURE — 36415 COLL VENOUS BLD VENIPUNCTURE: CPT | Performed by: INTERNAL MEDICINE

## 2024-02-08 PROCEDURE — 80053 COMPREHEN METABOLIC PANEL: CPT | Performed by: INTERNAL MEDICINE

## 2024-02-08 PROCEDURE — 83036 HEMOGLOBIN GLYCOSYLATED A1C: CPT | Performed by: INTERNAL MEDICINE

## 2024-02-15 ENCOUNTER — OFFICE VISIT (OUTPATIENT)
Dept: INTERNAL MEDICINE | Facility: CLINIC | Age: 69
End: 2024-02-15
Payer: MEDICARE

## 2024-02-15 VITALS
WEIGHT: 219.44 LBS | HEIGHT: 73 IN | BODY MASS INDEX: 29.08 KG/M2 | HEART RATE: 85 BPM | OXYGEN SATURATION: 99 % | DIASTOLIC BLOOD PRESSURE: 84 MMHG | SYSTOLIC BLOOD PRESSURE: 138 MMHG

## 2024-02-15 DIAGNOSIS — E11.9 TYPE 2 DIABETES MELLITUS WITHOUT COMPLICATION, WITHOUT LONG-TERM CURRENT USE OF INSULIN: ICD-10-CM

## 2024-02-15 DIAGNOSIS — I10 ESSENTIAL HYPERTENSION: Primary | ICD-10-CM

## 2024-02-15 DIAGNOSIS — N52.01 ERECTILE DYSFUNCTION DUE TO ARTERIAL INSUFFICIENCY: ICD-10-CM

## 2024-02-15 DIAGNOSIS — Z12.5 SPECIAL SCREENING FOR MALIGNANT NEOPLASM OF PROSTATE: ICD-10-CM

## 2024-02-15 PROCEDURE — 99999 PR PBB SHADOW E&M-EST. PATIENT-LVL III: CPT | Mod: PBBFAC,,, | Performed by: INTERNAL MEDICINE

## 2024-02-15 PROCEDURE — 99214 OFFICE O/P EST MOD 30 MIN: CPT | Mod: S$GLB,,, | Performed by: INTERNAL MEDICINE

## 2024-02-15 RX ORDER — VALSARTAN 160 MG/1
160 TABLET ORAL DAILY
Qty: 90 TABLET | Refills: 3 | Status: SHIPPED | OUTPATIENT
Start: 2024-02-15

## 2024-02-15 RX ORDER — TADALAFIL 20 MG/1
20 TABLET ORAL DAILY PRN
Qty: 6 TABLET | Refills: 11 | Status: SHIPPED | OUTPATIENT
Start: 2024-02-15 | End: 2025-02-14

## 2024-02-15 NOTE — PROGRESS NOTES
"Subjective:       Patient ID: Francis Weinberg is a 68 y.o. male.    Chief Complaint: Follow-up (6 mth follow up )    HPI  68 y.o. male here for follow up of    HTN  Amlodipine 10mg  Valsartan 320mg  Home readings down to 120s and he skips valsartan about 3 times per week.     HLD  Atorva 40mg    DM2 - diet controlled  Lab Results   Component Value Date    HGBA1C 6.4 (H) 02/08/2024    HGBA1C 6.2 (H) 06/28/2023    HGBA1C 6.1 (H) 11/01/2022       Elevated alt measurement, resolved on last 2 lab checks    ED  Cialis 20mg prn    Varicose veins on left leg, started wearing conpression socks which is helping a lot.   Review of Systems   Constitutional:  Negative for fever.   Respiratory:  Negative for shortness of breath.    Cardiovascular:  Negative for chest pain.   Musculoskeletal: Negative.    Skin: Negative.        Objective:   /84 (BP Location: Left arm, Patient Position: Sitting, BP Method: Medium (Manual))   Pulse 85   Ht 6' 1" (1.854 m)   Wt 99.6 kg (219 lb 7.5 oz)   SpO2 99%   BMI 28.96 kg/m²      Physical Exam  Constitutional:       General: He is not in acute distress.     Appearance: He is well-developed. He is not diaphoretic.   HENT:      Head: Normocephalic and atraumatic.   Cardiovascular:      Rate and Rhythm: Normal rate and regular rhythm.      Heart sounds: No murmur heard.  Pulmonary:      Effort: Pulmonary effort is normal. No respiratory distress.      Breath sounds: No wheezing or rales.   Skin:     General: Skin is warm and dry.   Neurological:      Mental Status: He is alert and oriented to person, place, and time.   Psychiatric:         Behavior: Behavior normal.         Assessment:       1. Essential hypertension    2. Erectile dysfunction due to arterial insufficiency    3. Type 2 diabetes mellitus without complication, without long-term current use of insulin    4. Special screening for malignant neoplasm of prostate        Plan:       Essential hypertension  -     Comprehensive " Metabolic Panel; Future; Expected date: 08/17/2024  -     valsartan (DIOVAN) 160 MG tablet; Take 1 tablet (160 mg total) by mouth once daily.  Dispense: 90 tablet; Refill: 3   Decreased as pt holding several times per week   Continue amlodipine 10mg    Erectile dysfunction due to arterial insufficiency  -     tadalafiL (CIALIS) 20 MG Tab; Take 1 tablet (20 mg total) by mouth daily as needed (erectile dysfunction).  Dispense: 6 tablet; Refill: 11    Type 2 diabetes mellitus without complication, without long-term current use of insulin  -     Hemoglobin A1C; Future; Expected date: 08/13/2024  -     Lipid Panel; Future; Expected date: 08/17/2024  -     TSH; Future; Expected date: 08/17/2024  -     CBC Auto Differential; Future; Expected date: 08/17/2024    Special screening for malignant neoplasm of prostate  -     PSA, Screening; Future; Expected date: 08/17/2024          Health Maintenance Due   Topic    Eye Exam       Fasting labs 6 mo  Eye exam done recent Dr. Zarina Holman [phone]

## 2024-02-20 ENCOUNTER — PATIENT OUTREACH (OUTPATIENT)
Dept: ADMINISTRATIVE | Facility: HOSPITAL | Age: 69
End: 2024-02-20
Payer: MEDICARE

## 2024-02-20 NOTE — PROGRESS NOTES

## 2024-02-20 NOTE — LETTER
AUTHORIZATION FOR RELEASE OF   CONFIDENTIAL INFORMATION    Dear [facility],    We are seeing Francis Weinberg, date of birth 1955, in the clinic at [facility]. Latoya Olson MD is the patient's PCP. Francis Weinberg has an outstanding lab/procedure at the time we reviewed his chart. In order to help keep his health information updated, he has authorized us to request the following medical record(s):        (  )  MAMMOGRAM                                      (  )  COLONOSCOPY      (  )  PAP SMEAR                                          (  )  OUTSIDE LAB RESULTS     (  )  DEXA SCAN                                          ( x )  EYE EXAM            (  )  FOOT EXAM                                          (  )  ENTIRE RECORD     (  )  OUTSIDE IMMUNIZATIONS                 (  )  _______________         Please fax records to [facility], Braaten, Jennifer N., MD, [phone]     If you have any questions, please contact Alma Rosa Yee LPN at [phone].           Patient Name: Francis Weinberg  : 1955  Patient Phone #: [phone]

## 2024-02-21 ENCOUNTER — PATIENT MESSAGE (OUTPATIENT)
Dept: ADMINISTRATIVE | Facility: HOSPITAL | Age: 69
End: 2024-02-21
Payer: MEDICARE

## 2024-02-22 ENCOUNTER — PATIENT OUTREACH (OUTPATIENT)
Dept: ADMINISTRATIVE | Facility: HOSPITAL | Age: 69
End: 2024-02-22
Payer: MEDICARE

## 2024-02-22 NOTE — PROGRESS NOTES

## 2024-02-23 ENCOUNTER — OFFICE VISIT (OUTPATIENT)
Dept: URGENT CARE | Facility: CLINIC | Age: 69
End: 2024-02-23
Payer: MEDICARE

## 2024-02-23 ENCOUNTER — HOSPITAL ENCOUNTER (EMERGENCY)
Facility: HOSPITAL | Age: 69
Discharge: HOME | End: 2024-02-23
Attending: EMERGENCY MEDICINE
Payer: MEDICARE

## 2024-02-23 VITALS
WEIGHT: 219 LBS | DIASTOLIC BLOOD PRESSURE: 90 MMHG | HEIGHT: 73 IN | BODY MASS INDEX: 29.03 KG/M2 | SYSTOLIC BLOOD PRESSURE: 143 MMHG | HEART RATE: 63 BPM | RESPIRATION RATE: 18 BRPM | TEMPERATURE: 98 F | OXYGEN SATURATION: 97 %

## 2024-02-23 VITALS
TEMPERATURE: 98 F | DIASTOLIC BLOOD PRESSURE: 63 MMHG | RESPIRATION RATE: 16 BRPM | OXYGEN SATURATION: 96 % | HEART RATE: 52 BPM | BODY MASS INDEX: 28.89 KG/M2 | WEIGHT: 219 LBS | SYSTOLIC BLOOD PRESSURE: 145 MMHG

## 2024-02-23 DIAGNOSIS — I49.3 PVC'S (PREMATURE VENTRICULAR CONTRACTIONS): Primary | ICD-10-CM

## 2024-02-23 DIAGNOSIS — I49.9 IRREGULAR HEART RATE: ICD-10-CM

## 2024-02-23 DIAGNOSIS — R00.1 BRADYCARDIA: ICD-10-CM

## 2024-02-23 LAB
ANION GAP SERPL CALC-SCNC: 9 MMOL/L (ref 8–16)
BASOPHILS # BLD AUTO: 0.01 K/UL (ref 0–0.2)
BASOPHILS NFR BLD: 0.1 % (ref 0–1.9)
BUN SERPL-MCNC: 30 MG/DL (ref 8–23)
CALCIUM SERPL-MCNC: 9.3 MG/DL (ref 8.7–10.5)
CHLORIDE SERPL-SCNC: 107 MMOL/L (ref 95–110)
CO2 SERPL-SCNC: 22 MMOL/L (ref 23–29)
CREAT SERPL-MCNC: 1.2 MG/DL (ref 0.5–1.4)
DIFFERENTIAL METHOD BLD: ABNORMAL
EOSINOPHIL # BLD AUTO: 0.2 K/UL (ref 0–0.5)
EOSINOPHIL NFR BLD: 2.4 % (ref 0–8)
ERYTHROCYTE [DISTWIDTH] IN BLOOD BY AUTOMATED COUNT: 14.9 % (ref 11.5–14.5)
EST. GFR  (NO RACE VARIABLE): >60 ML/MIN/1.73 M^2
GLUCOSE SERPL-MCNC: 91 MG/DL (ref 70–110)
HCT VFR BLD AUTO: 40 % (ref 40–54)
HGB BLD-MCNC: 12.3 G/DL (ref 14–18)
IMM GRANULOCYTES # BLD AUTO: 0.01 K/UL (ref 0–0.04)
IMM GRANULOCYTES NFR BLD AUTO: 0.1 % (ref 0–0.5)
LYMPHOCYTES # BLD AUTO: 2.3 K/UL (ref 1–4.8)
LYMPHOCYTES NFR BLD: 32.2 % (ref 18–48)
MAGNESIUM SERPL-MCNC: 2.1 MG/DL (ref 1.6–2.6)
MCH RBC QN AUTO: 23.3 PG (ref 27–31)
MCHC RBC AUTO-ENTMCNC: 30.8 G/DL (ref 32–36)
MCV RBC AUTO: 76 FL (ref 82–98)
MONOCYTES # BLD AUTO: 0.7 K/UL (ref 0.3–1)
MONOCYTES NFR BLD: 9.6 % (ref 4–15)
NEUTROPHILS # BLD AUTO: 3.9 K/UL (ref 1.8–7.7)
NEUTROPHILS NFR BLD: 55.6 % (ref 38–73)
NRBC BLD-RTO: 0 /100 WBC
PLATELET # BLD AUTO: 226 K/UL (ref 150–450)
PMV BLD AUTO: 10.8 FL (ref 9.2–12.9)
POTASSIUM SERPL-SCNC: 3.8 MMOL/L (ref 3.5–5.1)
RBC # BLD AUTO: 5.29 M/UL (ref 4.6–6.2)
SODIUM SERPL-SCNC: 138 MMOL/L (ref 136–145)
TROPONIN I SERPL DL<=0.01 NG/ML-MCNC: <0.006 NG/ML (ref 0–0.03)
WBC # BLD AUTO: 6.99 K/UL (ref 3.9–12.7)

## 2024-02-23 PROCEDURE — 85025 COMPLETE CBC W/AUTO DIFF WBC: CPT | Performed by: EMERGENCY MEDICINE

## 2024-02-23 PROCEDURE — 80048 BASIC METABOLIC PNL TOTAL CA: CPT | Performed by: EMERGENCY MEDICINE

## 2024-02-23 PROCEDURE — 84484 ASSAY OF TROPONIN QUANT: CPT | Performed by: EMERGENCY MEDICINE

## 2024-02-23 PROCEDURE — 83735 ASSAY OF MAGNESIUM: CPT | Performed by: EMERGENCY MEDICINE

## 2024-02-23 PROCEDURE — 36415 COLL VENOUS BLD VENIPUNCTURE: CPT | Performed by: EMERGENCY MEDICINE

## 2024-02-23 PROCEDURE — 99214 OFFICE O/P EST MOD 30 MIN: CPT | Mod: S$GLB,,, | Performed by: NURSE PRACTITIONER

## 2024-02-23 PROCEDURE — 93005 ELECTROCARDIOGRAM TRACING: CPT

## 2024-02-23 PROCEDURE — 99284 EMERGENCY DEPT VISIT MOD MDM: CPT | Mod: 25

## 2024-02-23 PROCEDURE — 93010 ELECTROCARDIOGRAM REPORT: CPT | Mod: ,,, | Performed by: GENERAL PRACTICE

## 2024-02-23 PROCEDURE — 93000 ELECTROCARDIOGRAM COMPLETE: CPT | Mod: S$GLB,,, | Performed by: NURSE PRACTITIONER

## 2024-02-24 NOTE — PROGRESS NOTES
Subjective:      Patient ID: Francis Weinberg is a 68 y.o. male.    Vitals:  weight is 99.3 kg (219 lb). His oral temperature is 98.1 °F (36.7 °C). His blood pressure is 145/63 (abnormal) and his pulse is 52 (abnormal). His respiration is 16 and oxygen saturation is 96%.     Chief Complaint: Irregular Heart Beat    68-year-old male presents with concerns for heart rate irregularities. He has been taking his prescribed blood pressure medications - Diovan and Norvasc. He is here with his daughter who is a nurse. The patient reports that about three days ago the patient's son was with him who is also a nurse and he checked the patient's pulse. He reports that his pulse was 140. Since that time the patient has been monitoring his pulse over the last three days and he reports it has been as high as 140 and 150 and as low as 50. He denies ever having any shortness of breath or chest pain.         Cardiovascular:  Negative for chest trauma, chest pain, leg swelling, palpitations, sob on exertion and passing out.        Irregular pulse checks      Objective:     Physical Exam   Constitutional: He is oriented to person, place, and time.   HENT:   Head: Normocephalic and atraumatic.   Eyes: Pupils are equal, round, and reactive to light. Right eye exhibits no discharge. Left eye exhibits no discharge. No scleral icterus.   Cardiovascular: An irregular rhythm present. Bradycardia present.   No murmur heard.Exam reveals no gallop and no friction rub.   Pulmonary/Chest: Effort normal. No stridor. No respiratory distress. He has no wheezes. He has no rhonchi. He has no rales.   Abdominal: Normal appearance.   Musculoskeletal: Normal range of motion.         General: Normal range of motion.   Neurological: He is alert and oriented to person, place, and time.   Skin: Skin is warm and dry. Capillary refill takes less than 2 seconds.   Psychiatric: His behavior is normal. Mood normal.       Assessment:     1. PVC's (premature ventricular  contractions)    2. Irregular heart rate        Plan:     EKG - NSR with 1st degree block, multiple PVCs noted. No ST changes. EKG shared with Dr. Forte and discussed.     68-year-old male presents with concerns of irregular heart rate over the last three days. Reports pulse as high as 140 or 150 and as low as 50. He denies any chest pain or shortness of breath. Discussed that he does need follow-up with Cardiology. Discussed that I could not rule out any electrolyte abnormalities today. He could either try to follow-up with cardiology next week or go to the ED for labs now for further work up or at any time. Dr. Forte in agreement with plan of care. Emergency room precautions also given if he becomes symptomatic in any way.          PVC's (premature ventricular contractions)  -     EKG 12-lead; Future  -     Ambulatory referral/consult to Cardiology    Irregular heart rate

## 2024-02-24 NOTE — DISCHARGE INSTRUCTIONS
As we discussed, return to the emergency department for new or worsening symptoms including feeling of passing out, chest pain, or difficulty breathing.

## 2024-02-24 NOTE — ED PROVIDER NOTES
Chief complaint:  Bradycardia (X 1 week, Seen at Urgent care today.  Was told to come to ED due to abnormal EKG )      HPI:  Francis Weinberg is a 68 y.o. male with hx htn, DM presenting with concern regarding variation in heart rate.  Patient reports earlier today his heart rate was reading as fast above 100 absent any symptoms.  He reportedly went to urgent care where he had PVCs with a low heart rate at times in the 40s also without symptoms.  Heart rate at the time of my assessment is normal.  He remains without symptoms.  He denies new medications or change in medications apart from recent decrease in valsartan.    ROS: As per HPI and below:  No chest pain, shortness of breath, swelling, syncope or presyncope, lightheadedness, focal numbness or weakness low blood in the stools, dark stools, vomiting, diaphoresis, difficulty walking, oliguria.    Review of patient's allergies indicates:  No Known Allergies    Patient's Medications   New Prescriptions    No medications on file   Previous Medications    AMLODIPINE (NORVASC) 10 MG TABLET    Take 1 tablet (10 mg total) by mouth once daily.    ATORVASTATIN (LIPITOR) 40 MG TABLET    TAKE 1 TABLET BY MOUTH EVERY DAY IN THE EVENING    CHOLECALCIFEROL, VITAMIN D3, (VITAMIN D3) 25 MCG (1,000 UNIT) CAPSULE    Take 1 capsule (1,000 Units total) by mouth once daily.    TADALAFIL (CIALIS) 20 MG TAB    Take 1 tablet (20 mg total) by mouth daily as needed (erectile dysfunction).    VALSARTAN (DIOVAN) 160 MG TABLET    Take 1 tablet (160 mg total) by mouth once daily.   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     Cataract     Essential hypertension 6/26/2019    S/P right cataract extraction 2/28/2014    Type 2 diabetes mellitus without complication, without long-term current use of insulin 5/24/2022  Procedure:  COLONOSCOPY     - denies any chest pain, palpitations, shortness of breath, syncope, lightheadedness, seizures   - denies any recent infectious symptoms such as fevers, chills, cough   - denies taking any anticoagulation medications or any issues with bleeding, bruising, clotting      Relevant Problems   ANESTHESIA (within normal limits)      CARDIO   (+) Hyperlipidemia   (+) Migraine, menstrual      ENDO (within normal limits)      GI/HEPATIC (within normal limits)      /RENAL (within normal limits)      GYN (within normal limits)      HEMATOLOGY (within normal limits)      MUSCULOSKELETAL (within normal limits)      NEURO/PSYCH   (+) Migraine, menstrual   (+) Paresthesia of skin      PULMONARY (within normal limits)      Other   (+) Neck pain   (+) Obesity      Lab Results   Component Value Date    WBC 6 46 05/12/2022    HGB 13 5 05/12/2022    HCT 42 2 05/12/2022    MCV 93 05/12/2022     05/12/2022     Lab Results   Component Value Date     09/23/2015    SODIUM 139 05/12/2022    K 4 0 05/12/2022     05/12/2022    CO2 29 05/12/2022    ANIONGAP 7 09/23/2015    AGAP 4 05/12/2022    BUN 10 05/12/2022    CREATININE 0 78 05/12/2022    GLUF 90 05/12/2022    CALCIUM 9 0 05/12/2022    AST 22 05/12/2022    ALT 30 05/12/2022    ALKPHOS 46 05/12/2022    PROT 7 2 09/23/2015    TP 6 8 05/12/2022    BILITOT 0 42 09/23/2015    TBILI 0 55 05/12/2022    EGFR 91 05/12/2022     No results found for: PTT  No results found for: INR, PROTIME       Physical Exam    Airway    Mallampati score: II  TM Distance: >3 FB  Neck ROM: full     Dental   No notable dental hx     Cardiovascular  Rhythm: regular, Rate: normal, Cardiovascular exam normal    Pulmonary  Pulmonary exam normal Breath sounds clear to auscultation,     Other Findings        Anesthesia Plan  ASA Score- 2     Anesthesia Type- IV sedation with anesthesia with ASA Monitors           Additional Monitors:   Airway Plan:           Plan     Past Surgical History:   Procedure Laterality Date    CATARACT EXTRACTION W/  INTRAOCULAR LENS IMPLANT  2/27/14    Right Eye    COLONOSCOPY N/A 1/7/2020    Procedure: COLONOSCOPY;  Surgeon: Easu Barry MD;  Location: [facility] ([address] [place] [address]);  Service: Endoscopy;  Laterality: N/A;  Pt. cannot come earlier. EC       Social History     Socioeconomic History    Marital status:    Occupational History    Occupation: [employer] and      Employer: [employer]   Tobacco Use    Smoking status: Never    Smokeless tobacco: Never   Substance and Sexual Activity    Alcohol use: Yes     Comment: prevously socially, stopped for Lent 2019 and now rarely    Drug use: No    Sexual activity: Yes     Partners: Female   Social History Narrative    3 children, all 3 are RNs.      Social Determinants of Health     Financial Resource Strain: Low Risk  (9/12/2023)    Overall Financial Resource Strain (CARDIA)     Difficulty of Paying Living Expenses: Not hard at all   Food Insecurity: No Food Insecurity (9/12/2023)    Hunger Vital Sign     Worried About Running Out of Food in the Last Year: Never true     Ran Out of Food in the Last Year: Never true   Transportation Needs: No Transportation Needs (9/12/2023)    PRAPARE - Transportation     Lack of Transportation (Medical): No     Lack of Transportation (Non-Medical): No   Physical Activity: Insufficiently Active (9/12/2023)    Exercise Vital Sign     Days of Exercise per Week: 3 days     Minutes of Exercise per Session: 30 min   Stress: No Stress Concern Present (9/12/2023)    [place] [employer] - Occupational Stress Questionnaire     Feeling of Stress : Not at all   Social Connections: Socially Integrated (9/12/2023)    Social Connection and Isolation Panel [NHANES]     Frequency of Communication with Friends and Family: More than three times a week     Frequency of Social Gatherings with Friends and Family: Once a week     Attends  Factors-Exercise tolerance (METS): >4 METS  Chart reviewed  EKG reviewed  Imaging results reviewed  Existing labs reviewed  Patient summary reviewed  Patient is not a current smoker  Patient did not smoke on day of surgery  Obstructive sleep apnea risk education given perioperatively  Induction- intravenous  Postoperative Plan-     Informed Consent- Anesthetic plan and risks discussed with patient  I personally reviewed this patient with the CRNA  Discussed and agreed on the Anesthesia Plan with the CRNA  [employer] Services: More than 4 times per year     Active Member of Clubs or Organizations: Yes     Attends Club or Organization Meetings: Never     Marital Status:    Housing Stability: Low Risk  (9/12/2023)    Housing Stability Vital Sign     Unable to Pay for Housing in the Last Year: No     Number of Places Lived in the Last Year: 1     Unstable Housing in the Last Year: No       Family History   Problem Relation Age of Onset    Cataracts Mother     Hypertension Mother     Cerebral aneurysm Father     No Known Problems Brother     No Known Problems Sister     No Known Problems Maternal Aunt     No Known Problems Maternal Uncle     No Known Problems Paternal Aunt     No Known Problems Paternal Uncle     No Known Problems Maternal Grandmother     No Known Problems Maternal Grandfather     No Known Problems Paternal Grandmother     No Known Problems Paternal Grandfather     Amblyopia Neg Hx     Blindness Neg Hx     Cancer Neg Hx     Diabetes Neg Hx     Glaucoma Neg Hx     Macular degeneration Neg Hx     Retinal detachment Neg Hx     Strabismus Neg Hx     Stroke Neg Hx     Thyroid disease Neg Hx        Physical Exam:    Vitals:    02/23/24 2015   BP: 127/69   Pulse: (!) 43   Resp: 18   Temp: 97.7 °F (36.5 °C)     GENERAL:  No apparent distress.  Alert.    HEENT:  Moist mucous membranes.  Normocephalic and atraumatic.    NECK:  No swelling.  Midline trachea.   CARDIOVASCULAR:  Regular rate and rhythm.  2+ radial pulses.  No murmur.  PULMONARY:  Lungs clear to auscultation bilaterally.  No wheezes, rales, or rhonci.  Unlabored respirations.  ABDOMEN:  Non-tender and non-distended.    EXTREMITIES:  Warm and well perfused.  Brisk capillary refill.  No peripheral edema.  NEUROLOGICAL:  Normal mental status.  Appropriate and conversant.    SKIN:  No rashes or ecchymoses.      Labs Reviewed   CBC W/ AUTO DIFFERENTIAL - Abnormal; Notable for the following components:       Result Value    Hemoglobin 12.3 (*)     MCV  76 (*)     MCH 23.3 (*)     MCHC 30.8 (*)     RDW 14.9 (*)     All other components within normal limits   BASIC METABOLIC PANEL - Abnormal; Notable for the following components:    CO2 22 (*)     BUN 30 (*)     All other components within normal limits   MAGNESIUM   TROPONIN I       Current Discharge Medication List        CONTINUE these medications which have NOT CHANGED    Details   amLODIPine (NORVASC) 10 MG tablet Take 1 tablet (10 mg total) by mouth once daily.  Qty: 90 tablet, Refills: 3    Comments: .  Associated Diagnoses: Hypertension, essential      atorvastatin (LIPITOR) 40 MG tablet TAKE 1 TABLET BY MOUTH EVERY DAY IN THE EVENING  Qty: 90 tablet, Refills: 3    Associated Diagnoses: Essential hypertension; Type 2 diabetes mellitus without complication, without long-term current use of insulin      cholecalciferol, vitamin D3, (VITAMIN D3) 25 mcg (1,000 unit) capsule Take 1 capsule (1,000 Units total) by mouth once daily.  Refills: 0    Associated Diagnoses: Vitamin D insufficiency      tadalafiL (CIALIS) 20 MG Tab Take 1 tablet (20 mg total) by mouth daily as needed (erectile dysfunction).  Qty: 6 tablet, Refills: 11    Associated Diagnoses: Erectile dysfunction due to arterial insufficiency      valsartan (DIOVAN) 160 MG tablet Take 1 tablet (160 mg total) by mouth once daily.  Qty: 90 tablet, Refills: 3    Comments: .decrease from 320mg  Associated Diagnoses: Essential hypertension             Orders Placed This Encounter   Procedures    Complete Blood Count (CBC)    Basic Metabolic Panel (BMP)    Magnesium    Troponin I    Cardiac Monitoring - Adult    EKG 12-lead       Imaging Results    None         ED Course as of 02/23/24 2152 Fri Feb 23, 2024 2037 EKG:  Sinus rhythm with primary AV block, rate of 74, normal intervals, L axis, LAFB, biatrial enlargement.  No significant change compared to last prior 2019.  Old T-wave inversion in III.  There are no acute ST or T wave changes suggestive of  acute ischemia or infarction.  No sign of arrhythmia including no delta waves, Brugada syndrome, or signs of hypertrophic cardiomyopathy.  (Independently interpreted by me)   [MR]      ED Course User Index  [MR] Deshawn Canseco MD       MDM:    68 y.o. male with intermittent tachycardia and bradycardia without symptoms.  Low suspicion for life-threatening arrhythmia.  Possible intermittent non life-threatening arrhythmia considered.  This is less urgent as extremes of heart rate are transient and not accompanied by any symptoms.  I will monitor here with check of some labs to assess for any obvious end-organ dysfunction including any electrolyte derangement.  Very low suspicion for ACS.  EKG appears similar to prior at present.  PVCs may be intermittent but do not necessarily poor tend life-threatening process.  Patient observed on the monitor and he is noted to have inaccurate count, falsely elevated heart rate at times compared to palpated pulse, possibly due to prominent P waves that are chronic for him being counted.  He remains asymptomatic   He may follow-up with Cardiology.  Detailed return precautions reviewed with patient and family.    Diagnoses:    1. Bradycardia       Deshawn Canseco MD  02/23/24 [id number]

## 2024-02-28 ENCOUNTER — PATIENT MESSAGE (OUTPATIENT)
Dept: ADMINISTRATIVE | Facility: HOSPITAL | Age: 69
End: 2024-02-28
Payer: MEDICARE

## 2024-03-02 LAB
OHS QRS DURATION: 104 MS
OHS QTC CALCULATION: 470 MS

## 2024-03-07 DIAGNOSIS — I10 HYPERTENSION, ESSENTIAL: ICD-10-CM

## 2024-03-08 RX ORDER — AMLODIPINE BESYLATE 10 MG/1
10 TABLET ORAL
Qty: 90 TABLET | Refills: 3 | Status: SHIPPED | OUTPATIENT
Start: 2024-03-08

## 2024-03-14 ENCOUNTER — TELEPHONE (OUTPATIENT)
Dept: PHARMACY | Facility: CLINIC | Age: 69
End: 2024-03-14
Payer: MEDICARE

## 2024-04-12 DIAGNOSIS — R00.2 PALPITATIONS: Primary | ICD-10-CM

## 2024-04-15 ENCOUNTER — OFFICE VISIT (OUTPATIENT)
Dept: CARDIOLOGY | Facility: CLINIC | Age: 69
End: 2024-04-15
Payer: MEDICARE

## 2024-04-15 VITALS
OXYGEN SATURATION: 98 % | DIASTOLIC BLOOD PRESSURE: 60 MMHG | BODY MASS INDEX: 28.31 KG/M2 | SYSTOLIC BLOOD PRESSURE: 100 MMHG | HEART RATE: 84 BPM | HEIGHT: 73 IN | WEIGHT: 213.63 LBS

## 2024-04-15 DIAGNOSIS — R73.03 PRE-DIABETES: ICD-10-CM

## 2024-04-15 DIAGNOSIS — I10 ESSENTIAL HYPERTENSION: ICD-10-CM

## 2024-04-15 DIAGNOSIS — R00.1 BRADYCARDIA: ICD-10-CM

## 2024-04-15 DIAGNOSIS — R00.0 TACHYCARDIA: Primary | ICD-10-CM

## 2024-04-15 PROCEDURE — 1126F AMNT PAIN NOTED NONE PRSNT: CPT | Mod: CPTII,S$GLB,, | Performed by: INTERNAL MEDICINE

## 2024-04-15 PROCEDURE — 99203 OFFICE O/P NEW LOW 30 MIN: CPT | Mod: S$GLB,,, | Performed by: INTERNAL MEDICINE

## 2024-04-15 PROCEDURE — 3008F BODY MASS INDEX DOCD: CPT | Mod: CPTII,S$GLB,, | Performed by: INTERNAL MEDICINE

## 2024-04-15 PROCEDURE — 1101F PT FALLS ASSESS-DOCD LE1/YR: CPT | Mod: CPTII,S$GLB,, | Performed by: INTERNAL MEDICINE

## 2024-04-15 PROCEDURE — 4010F ACE/ARB THERAPY RXD/TAKEN: CPT | Mod: CPTII,S$GLB,, | Performed by: INTERNAL MEDICINE

## 2024-04-15 PROCEDURE — 3074F SYST BP LT 130 MM HG: CPT | Mod: CPTII,S$GLB,, | Performed by: INTERNAL MEDICINE

## 2024-04-15 PROCEDURE — 3288F FALL RISK ASSESSMENT DOCD: CPT | Mod: CPTII,S$GLB,, | Performed by: INTERNAL MEDICINE

## 2024-04-15 PROCEDURE — 3078F DIAST BP <80 MM HG: CPT | Mod: CPTII,S$GLB,, | Performed by: INTERNAL MEDICINE

## 2024-04-15 PROCEDURE — 99999 PR PBB SHADOW E&M-EST. PATIENT-LVL III: CPT | Mod: PBBFAC,,, | Performed by: INTERNAL MEDICINE

## 2024-04-15 PROCEDURE — 1159F MED LIST DOCD IN RCRD: CPT | Mod: CPTII,S$GLB,, | Performed by: INTERNAL MEDICINE

## 2024-04-15 PROCEDURE — 3044F HG A1C LEVEL LT 7.0%: CPT | Mod: CPTII,S$GLB,, | Performed by: INTERNAL MEDICINE

## 2024-04-15 NOTE — PROGRESS NOTES
Subjective:    Patient ID:  Francis Weinberg is a 68 y.o. male who presents for evaluation of Palpitations      HPI  The patient is a 68 year old male with hypertension, diabetes and hyperlipidemia was seen in the ED 2/23/24 with a concern for a variation in his heart rate. EKG on that date revealed regular sinus with biatrial enlargement and left anterior fascicular block. The change is heat rate is asymptomatic. He has no chest pain or SOB. He goes to a gym and has a normal rate response to exercise. His father had a cerebral anurysm  Lab Results   Component Value Date     02/23/2024    K 3.8 02/23/2024     02/23/2024    CO2 22 (L) 02/23/2024    BUN 30 (H) 02/23/2024    CREATININE 1.2 02/23/2024    GLU 91 02/23/2024    HGBA1C 6.4 (H) 02/08/2024    MG 2.1 02/23/2024    AST 28 02/08/2024    ALT 42 02/08/2024    ALBUMIN 3.9 02/08/2024    PROT 7.5 02/08/2024    BILITOT 0.5 02/08/2024    WBC 6.99 02/23/2024    HGB 12.3 (L) 02/23/2024    HCT 40.0 02/23/2024    MCV 76 (L) 02/23/2024     02/23/2024    PSA 1.3 06/28/2023    TSH 1.243 06/28/2023         Lab Results   Component Value Date    CHOL 122 06/28/2023    HDL 42 06/28/2023    TRIG 67 06/28/2023       Lab Results   Component Value Date    LDLCALC 66.6 06/28/2023       Past Medical History:   Diagnosis Date    Anemia     chronic - 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     Cataract     Essential hypertension 6/26/2019    S/P right cataract extraction 2/28/2014    Type 2 diabetes mellitus without complication, without long-term current use of insulin 5/24/2022       Current Outpatient Medications:     amLODIPine (NORVASC) 10 MG tablet, TAKE 1 TABLET BY MOUTH EVERY DAY, Disp: 90 tablet, Rfl: 3    atorvastatin (LIPITOR) 40 MG tablet, TAKE 1 TABLET BY MOUTH EVERY DAY IN THE EVENING, Disp: 90 tablet, Rfl: 3    cholecalciferol, vitamin D3, (VITAMIN D3) 25 mcg (1,000 unit) capsule, Take 1 capsule (1,000 Units total) by mouth once daily., Disp: , Rfl: 0    tadalafiL  "(CIALIS) 20 MG Tab, Take 1 tablet (20 mg total) by mouth daily as needed (erectile dysfunction)., Disp: 6 tablet, Rfl: 11    valsartan (DIOVAN) 160 MG tablet, Take 1 tablet (160 mg total) by mouth once daily., Disp: 90 tablet, Rfl: 3          Review of Systems   Constitutional: Negative for decreased appetite, diaphoresis, fever, malaise/fatigue, weight gain and weight loss.   HENT:  Negative for congestion, ear discharge, ear pain and nosebleeds.    Eyes:  Negative for blurred vision, double vision and visual disturbance.   Cardiovascular:  Negative for chest pain, claudication, cyanosis, dyspnea on exertion, irregular heartbeat, leg swelling, near-syncope, orthopnea, palpitations, paroxysmal nocturnal dyspnea and syncope.   Respiratory:  Negative for cough, hemoptysis, shortness of breath, sleep disturbances due to breathing, snoring, sputum production and wheezing.    Endocrine: Negative for polydipsia, polyphagia and polyuria.   Hematologic/Lymphatic: Negative for adenopathy and bleeding problem. Does not bruise/bleed easily.   Skin:  Negative for color change, nail changes, poor wound healing and rash.   Musculoskeletal:  Negative for muscle cramps and muscle weakness.   Gastrointestinal:  Negative for abdominal pain, anorexia, change in bowel habit, hematochezia, nausea and vomiting.   Genitourinary:  Negative for dysuria, frequency and hematuria.   Neurological:  Negative for brief paralysis, difficulty with concentration, excessive daytime sleepiness, dizziness, focal weakness, headaches, light-headedness, seizures, vertigo and weakness.   Psychiatric/Behavioral:  Negative for altered mental status and depression.    Allergic/Immunologic: Negative for persistent infections.        Objective:/60   Pulse 84   Ht 6' 1" (1.854 m)   Wt 96.9 kg (213 lb 10 oz)   SpO2 98%   BMI 28.18 kg/m²             Physical Exam  Constitutional:       Appearance: He is well-developed.   HENT:      Head: Normocephalic. "      Right Ear: External ear normal.      Left Ear: External ear normal.      Nose: Nose normal.   Eyes:      General: No scleral icterus.     Pupils: Pupils are equal, round, and reactive to light.   Neck:      Thyroid: No thyromegaly.      Vascular: No JVD.      Trachea: No tracheal deviation.   Cardiovascular:      Rate and Rhythm: Normal rate and regular rhythm.      Pulses: Intact distal pulses.           Carotid pulses are 2+ on the right side and 2+ on the left side.       Dorsalis pedis pulses are 2+ on the right side and 2+ on the left side.        Posterior tibial pulses are 2+ on the right side and 2+ on the left side.      Heart sounds: S1 normal and S2 normal. No murmur heard.     No friction rub. No gallop.   Pulmonary:      Effort: Pulmonary effort is normal.      Breath sounds: Normal breath sounds.   Abdominal:      General: Bowel sounds are normal. There is no distension.      Tenderness: There is no abdominal tenderness. There is no guarding.   Musculoskeletal:         General: No tenderness. Normal range of motion.      Cervical back: Normal range of motion and neck supple.   Lymphadenopathy:      Comments: Palpation of neck and groin lymph nodes normal   Skin:     General: Skin is dry.      Comments: No ankle nor pretibial edema   Neurological:      Mental Status: He is alert and oriented to person, place, and time.      Cranial Nerves: No cranial nerve deficit.      Motor: No abnormal muscle tone.      Coordination: Coordination normal.   Psychiatric:         Behavior: Behavior normal.         Thought Content: Thought content normal.         Judgment: Judgment normal.           Assessment:       No diagnosis found.     Plan:       There are no diagnoses linked to this encounter.

## 2024-05-02 ENCOUNTER — CLINICAL SUPPORT (OUTPATIENT)
Dept: CARDIOLOGY | Facility: HOSPITAL | Age: 69
End: 2024-05-02
Attending: INTERNAL MEDICINE
Payer: MEDICARE

## 2024-05-02 DIAGNOSIS — R00.1 BRADYCARDIA: ICD-10-CM

## 2024-05-02 DIAGNOSIS — R00.0 TACHYCARDIA: ICD-10-CM

## 2024-05-02 PROCEDURE — 93227 XTRNL ECG REC<48 HR R&I: CPT | Mod: ,,, | Performed by: INTERNAL MEDICINE

## 2024-05-02 PROCEDURE — 93226 XTRNL ECG REC<48 HR SCAN A/R: CPT

## 2024-05-08 LAB
OHS CV EVENT MONITOR DAY: 0
OHS CV HOLTER LENGTH DECIMAL HOURS: 48
OHS CV HOLTER LENGTH HOURS: 48
OHS CV HOLTER LENGTH MINUTES: 0
OHS CV HOLTER SINUS AVERAGE HR: 79
OHS CV HOLTER SINUS MAX HR: 132
OHS CV HOLTER SINUS MIN HR: 50

## 2024-05-15 ENCOUNTER — PATIENT MESSAGE (OUTPATIENT)
Dept: CARDIOLOGY | Facility: CLINIC | Age: 69
End: 2024-05-15
Payer: MEDICARE

## 2024-06-10 ENCOUNTER — PATIENT MESSAGE (OUTPATIENT)
Dept: INTERNAL MEDICINE | Facility: CLINIC | Age: 69
End: 2024-06-10
Payer: MEDICARE

## 2024-07-05 DIAGNOSIS — E11.9 TYPE 2 DIABETES MELLITUS WITHOUT COMPLICATION: ICD-10-CM

## 2024-08-08 ENCOUNTER — LAB VISIT (OUTPATIENT)
Dept: LAB | Facility: HOSPITAL | Age: 69
End: 2024-08-08
Attending: INTERNAL MEDICINE
Payer: MEDICARE

## 2024-08-08 DIAGNOSIS — E11.9 TYPE 2 DIABETES MELLITUS WITHOUT COMPLICATION, WITHOUT LONG-TERM CURRENT USE OF INSULIN: ICD-10-CM

## 2024-08-08 DIAGNOSIS — I10 ESSENTIAL HYPERTENSION: ICD-10-CM

## 2024-08-08 DIAGNOSIS — Z12.5 SPECIAL SCREENING FOR MALIGNANT NEOPLASM OF PROSTATE: ICD-10-CM

## 2024-08-08 LAB
ALBUMIN SERPL BCP-MCNC: 3.9 G/DL (ref 3.5–5.2)
ALP SERPL-CCNC: 137 U/L (ref 55–135)
ALT SERPL W/O P-5'-P-CCNC: 39 U/L (ref 10–44)
ANION GAP SERPL CALC-SCNC: 7 MMOL/L (ref 8–16)
AST SERPL-CCNC: 29 U/L (ref 10–40)
BASOPHILS # BLD AUTO: 0.03 K/UL (ref 0–0.2)
BASOPHILS NFR BLD: 0.6 % (ref 0–1.9)
BILIRUB SERPL-MCNC: 0.5 MG/DL (ref 0.1–1)
BUN SERPL-MCNC: 19 MG/DL (ref 8–23)
CALCIUM SERPL-MCNC: 9.9 MG/DL (ref 8.7–10.5)
CHLORIDE SERPL-SCNC: 107 MMOL/L (ref 95–110)
CHOLEST SERPL-MCNC: 109 MG/DL (ref 120–199)
CHOLEST/HDLC SERPL: 3.2 {RATIO} (ref 2–5)
CO2 SERPL-SCNC: 27 MMOL/L (ref 23–29)
COMPLEXED PSA SERPL-MCNC: 1.7 NG/ML (ref 0–4)
CREAT SERPL-MCNC: 0.9 MG/DL (ref 0.5–1.4)
DIFFERENTIAL METHOD BLD: ABNORMAL
EOSINOPHIL # BLD AUTO: 0.1 K/UL (ref 0–0.5)
EOSINOPHIL NFR BLD: 2 % (ref 0–8)
ERYTHROCYTE [DISTWIDTH] IN BLOOD BY AUTOMATED COUNT: 15.2 % (ref 11.5–14.5)
EST. GFR  (NO RACE VARIABLE): >60 ML/MIN/1.73 M^2
ESTIMATED AVG GLUCOSE: 134 MG/DL (ref 68–131)
GLUCOSE SERPL-MCNC: 108 MG/DL (ref 70–110)
HBA1C MFR BLD: 6.3 % (ref 4–5.6)
HCT VFR BLD AUTO: 43.9 % (ref 40–54)
HDLC SERPL-MCNC: 34 MG/DL (ref 40–75)
HDLC SERPL: 31.2 % (ref 20–50)
HGB BLD-MCNC: 12.6 G/DL (ref 14–18)
IMM GRANULOCYTES # BLD AUTO: 0.01 K/UL (ref 0–0.04)
IMM GRANULOCYTES NFR BLD AUTO: 0.2 % (ref 0–0.5)
LDLC SERPL CALC-MCNC: 63 MG/DL (ref 63–159)
LYMPHOCYTES # BLD AUTO: 2.1 K/UL (ref 1–4.8)
LYMPHOCYTES NFR BLD: 38.9 % (ref 18–48)
MCH RBC QN AUTO: 22.2 PG (ref 27–31)
MCHC RBC AUTO-ENTMCNC: 28.7 G/DL (ref 32–36)
MCV RBC AUTO: 77 FL (ref 82–98)
MONOCYTES # BLD AUTO: 0.3 K/UL (ref 0.3–1)
MONOCYTES NFR BLD: 5.7 % (ref 4–15)
NEUTROPHILS # BLD AUTO: 2.9 K/UL (ref 1.8–7.7)
NEUTROPHILS NFR BLD: 52.6 % (ref 38–73)
NONHDLC SERPL-MCNC: 75 MG/DL
NRBC BLD-RTO: 0 /100 WBC
PLATELET # BLD AUTO: 213 K/UL (ref 150–450)
PMV BLD AUTO: 11.9 FL (ref 9.2–12.9)
POTASSIUM SERPL-SCNC: 3.8 MMOL/L (ref 3.5–5.1)
PROT SERPL-MCNC: 7.8 G/DL (ref 6–8.4)
RBC # BLD AUTO: 5.68 M/UL (ref 4.6–6.2)
SODIUM SERPL-SCNC: 141 MMOL/L (ref 136–145)
TRIGL SERPL-MCNC: 60 MG/DL (ref 30–150)
TSH SERPL DL<=0.005 MIU/L-ACNC: 0.79 UIU/ML (ref 0.4–4)
WBC # BLD AUTO: 5.43 K/UL (ref 3.9–12.7)

## 2024-08-08 PROCEDURE — 80053 COMPREHEN METABOLIC PANEL: CPT | Performed by: INTERNAL MEDICINE

## 2024-08-08 PROCEDURE — 80061 LIPID PANEL: CPT | Performed by: INTERNAL MEDICINE

## 2024-08-08 PROCEDURE — 85025 COMPLETE CBC W/AUTO DIFF WBC: CPT | Performed by: INTERNAL MEDICINE

## 2024-08-08 PROCEDURE — 36415 COLL VENOUS BLD VENIPUNCTURE: CPT | Performed by: INTERNAL MEDICINE

## 2024-08-08 PROCEDURE — 84443 ASSAY THYROID STIM HORMONE: CPT | Performed by: INTERNAL MEDICINE

## 2024-08-08 PROCEDURE — 84153 ASSAY OF PSA TOTAL: CPT | Performed by: INTERNAL MEDICINE

## 2024-08-08 PROCEDURE — 83036 HEMOGLOBIN GLYCOSYLATED A1C: CPT | Performed by: INTERNAL MEDICINE

## 2024-08-15 ENCOUNTER — LAB VISIT (OUTPATIENT)
Dept: LAB | Facility: HOSPITAL | Age: 69
End: 2024-08-15
Attending: INTERNAL MEDICINE
Payer: MEDICARE

## 2024-08-15 ENCOUNTER — PATIENT OUTREACH (OUTPATIENT)
Dept: ADMINISTRATIVE | Facility: HOSPITAL | Age: 69
End: 2024-08-15
Payer: MEDICARE

## 2024-08-15 ENCOUNTER — OFFICE VISIT (OUTPATIENT)
Dept: INTERNAL MEDICINE | Facility: CLINIC | Age: 69
End: 2024-08-15
Payer: MEDICARE

## 2024-08-15 VITALS
DIASTOLIC BLOOD PRESSURE: 74 MMHG | HEIGHT: 74 IN | SYSTOLIC BLOOD PRESSURE: 100 MMHG | WEIGHT: 204.81 LBS | BODY MASS INDEX: 26.28 KG/M2 | HEART RATE: 88 BPM | OXYGEN SATURATION: 99 %

## 2024-08-15 DIAGNOSIS — I10 HYPERTENSION, ESSENTIAL: ICD-10-CM

## 2024-08-15 DIAGNOSIS — E11.9 TYPE 2 DIABETES MELLITUS WITHOUT COMPLICATION, WITHOUT LONG-TERM CURRENT USE OF INSULIN: ICD-10-CM

## 2024-08-15 DIAGNOSIS — I10 ESSENTIAL HYPERTENSION: ICD-10-CM

## 2024-08-15 DIAGNOSIS — Z12.83 SKIN EXAM, SCREENING FOR CANCER: ICD-10-CM

## 2024-08-15 DIAGNOSIS — E11.9 TYPE 2 DIABETES MELLITUS WITHOUT COMPLICATION, WITHOUT LONG-TERM CURRENT USE OF INSULIN: Primary | ICD-10-CM

## 2024-08-15 LAB
ALBUMIN/CREAT UR: 7.2 UG/MG (ref 0–30)
CREAT UR-MCNC: 236 MG/DL (ref 23–375)
MICROALBUMIN UR DL<=1MG/L-MCNC: 17 UG/ML

## 2024-08-15 PROCEDURE — 3074F SYST BP LT 130 MM HG: CPT | Mod: CPTII,S$GLB,, | Performed by: INTERNAL MEDICINE

## 2024-08-15 PROCEDURE — 4010F ACE/ARB THERAPY RXD/TAKEN: CPT | Mod: CPTII,S$GLB,, | Performed by: INTERNAL MEDICINE

## 2024-08-15 PROCEDURE — 3044F HG A1C LEVEL LT 7.0%: CPT | Mod: CPTII,S$GLB,, | Performed by: INTERNAL MEDICINE

## 2024-08-15 PROCEDURE — 82570 ASSAY OF URINE CREATININE: CPT | Performed by: INTERNAL MEDICINE

## 2024-08-15 PROCEDURE — 82043 UR ALBUMIN QUANTITATIVE: CPT | Performed by: INTERNAL MEDICINE

## 2024-08-15 PROCEDURE — 3008F BODY MASS INDEX DOCD: CPT | Mod: CPTII,S$GLB,, | Performed by: INTERNAL MEDICINE

## 2024-08-15 PROCEDURE — 3288F FALL RISK ASSESSMENT DOCD: CPT | Mod: CPTII,S$GLB,, | Performed by: INTERNAL MEDICINE

## 2024-08-15 PROCEDURE — 99999 PR PBB SHADOW E&M-EST. PATIENT-LVL III: CPT | Mod: PBBFAC,,, | Performed by: INTERNAL MEDICINE

## 2024-08-15 PROCEDURE — 1160F RVW MEDS BY RX/DR IN RCRD: CPT | Mod: CPTII,S$GLB,, | Performed by: INTERNAL MEDICINE

## 2024-08-15 PROCEDURE — 1159F MED LIST DOCD IN RCRD: CPT | Mod: CPTII,S$GLB,, | Performed by: INTERNAL MEDICINE

## 2024-08-15 PROCEDURE — 1101F PT FALLS ASSESS-DOCD LE1/YR: CPT | Mod: CPTII,S$GLB,, | Performed by: INTERNAL MEDICINE

## 2024-08-15 PROCEDURE — 3078F DIAST BP <80 MM HG: CPT | Mod: CPTII,S$GLB,, | Performed by: INTERNAL MEDICINE

## 2024-08-15 PROCEDURE — 1126F AMNT PAIN NOTED NONE PRSNT: CPT | Mod: CPTII,S$GLB,, | Performed by: INTERNAL MEDICINE

## 2024-08-15 PROCEDURE — 99214 OFFICE O/P EST MOD 30 MIN: CPT | Mod: S$GLB,,, | Performed by: INTERNAL MEDICINE

## 2024-08-15 RX ORDER — AMLODIPINE BESYLATE 5 MG/1
5 TABLET ORAL DAILY
Qty: 90 TABLET | Refills: 3 | Status: SHIPPED | OUTPATIENT
Start: 2024-08-15

## 2024-08-15 RX ORDER — ATORVASTATIN CALCIUM 40 MG/1
40 TABLET, FILM COATED ORAL NIGHTLY
Qty: 90 TABLET | Refills: 3 | Status: SHIPPED | OUTPATIENT
Start: 2024-08-15

## 2024-08-15 NOTE — LETTER
AUTHORIZATION FOR RELEASE OF   CONFIDENTIAL INFORMATION    Dear Dr.Jarrett Jaime,    We are seeing Francis Weinberg, date of birth 1955, in the clinic at [facility]. Latoya Olson MD is the patient's PCP. Francis Weinberg has an outstanding lab/procedure at the time we reviewed his chart. In order to help keep his health information updated, he has authorized us to request the following medical record(s):        (  )  MAMMOGRAM                                      (  )  COLONOSCOPY      (  )  PAP SMEAR                                          (  )  OUTSIDE LAB RESULTS     (  )  DEXA SCAN                                          ( x )  EYE EXAM            (  )  FOOT EXAM                                          (  )  ENTIRE RECORD     (  )  OUTSIDE IMMUNIZATIONS                 (  )  _______________         Please fax records to [facility], Braaten, Jennifer N., MD, [phone]     If you have any questions, please contact Alma Rosa SMITH LPN at [phone].           Patient Name: Francis Weinberg  : 1955  Patient Phone #: [phone]

## 2024-08-15 NOTE — PROGRESS NOTES
Health Maintenance Due   Topic Date Due    RSV Vaccine (Age 60+ and Pregnant patients) (1 - 1-dose 60+ series) Never done    Eye Exam  10/27/2023    COVID-19 Vaccine (7 - 2023-24 season) 05/02/2024    Diabetes Urine Screening  06/28/2024       Chart reviewed and updated. Reconciled immunizations. Requested eye exam records from Dr.Jarrett Addison Yee LPN   Clinical Care Coordinator  [facility]

## 2024-08-15 NOTE — PROGRESS NOTES
"Subjective:       Patient ID: Francis Weinberg is a 68 y.o. male.    Chief Complaint: Follow-up    HPI  68 y.o. male here for follow up of       HTN  Amlodipine 10mg  Valsartan 320mg -> 160mg at last visit     At home: 100-120 systolics.  He skips his valsartan about twice a week when running low.     HLD  Atorva 40mg     DM2 - diet controlled  Lab Results   Component Value Date    HGBA1C 6.3 (H) 08/08/2024    HGBA1C 6.4 (H) 02/08/2024    HGBA1C 6.2 (H) 06/28/2023     ED  Cialis 20mg prn    Drinking more water and being more active. Has helped w weight.     Wt Readings from Last 4 Encounters:   08/15/24 92.9 kg (204 lb 12.9 oz)   04/15/24 96.9 kg (213 lb 10 oz)   02/23/24 99.3 kg (219 lb)   02/23/24 99.3 kg (219 lb)       Review of Systems   Constitutional:  Negative for fever.   Respiratory:  Negative for shortness of breath.    Cardiovascular:  Negative for chest pain.   Musculoskeletal: Negative.    Skin: Negative.        Objective:   /74 (BP Location: Left arm, Patient Position: Sitting, BP Method: Medium (Automatic))   Pulse 88   Ht 6' 2" (1.88 m)   Wt 92.9 kg (204 lb 12.9 oz)   SpO2 99%   BMI 26.30 kg/m²      Physical Exam  Constitutional:       General: He is not in acute distress.     Appearance: He is well-developed. He is not diaphoretic.   HENT:      Head: Normocephalic and atraumatic.   Cardiovascular:      Rate and Rhythm: Normal rate and regular rhythm.      Heart sounds: No murmur heard.  Pulmonary:      Effort: Pulmonary effort is normal. No respiratory distress.      Breath sounds: No wheezing or rales.   Skin:     General: Skin is warm and dry.   Neurological:      Mental Status: He is alert and oriented to person, place, and time.   Psychiatric:         Behavior: Behavior normal.         Protective Sensation (w/ 10 gram monofilament):  Right: Intact  Left: Intact    Visual Inspection:  Normal -  Bilateral    Pedal Pulses:   Right: Present  Left: Present    Posterior tibialis: "   Right:Present  Left: Present    Assessment:       1. Type 2 diabetes mellitus without complication, without long-term current use of insulin    2. Hypertension, essential    3. Essential hypertension    4. Skin exam, screening for cancer        Plan:       Type 2 diabetes mellitus without complication, without long-term current use of insulin  -     atorvastatin (LIPITOR) 40 MG tablet; Take 1 tablet (40 mg total) by mouth every evening.  Dispense: 90 tablet; Refill: 3  -     Microalbumin/Creatinine Ratio, Urine; Future; Expected date: 08/15/2024    Hypertension, essential  -     DECREASE amLODIPine (NORVASC) 5 MG tablet; Take 1 tablet (5 mg total) by mouth once daily.  Dispense: 90 tablet; Refill: 3  Continue valsartan 160mg    Essential hypertension  -     atorvastatin (LIPITOR) 40 MG tablet; Take 1 tablet (40 mg total) by mouth every evening.  Dispense: 90 tablet; Refill: 3    Skin exam, screening for cancer  -     Ambulatory referral/consult to Dermatology; Future; Expected date: 08/22/2024          Health Maintenance Due   Topic    Eye Exam     Diabetes Urine Screening       Foot  Eye-msg to Dr. Kuldip St outside of [facility]  urine

## 2024-10-31 LAB
LEFT EYE DM RETINOPATHY: NEGATIVE
RIGHT EYE DM RETINOPATHY: NEGATIVE

## 2024-12-09 ENCOUNTER — PATIENT OUTREACH (OUTPATIENT)
Dept: ADMINISTRATIVE | Facility: HOSPITAL | Age: 69
End: 2024-12-09
Payer: MEDICARE

## 2024-12-09 ENCOUNTER — IMMUNIZATION (OUTPATIENT)
Dept: INTERNAL MEDICINE | Facility: CLINIC | Age: 69
End: 2024-12-09
Payer: MEDICARE

## 2024-12-09 ENCOUNTER — OFFICE VISIT (OUTPATIENT)
Dept: DERMATOLOGY | Facility: CLINIC | Age: 69
End: 2024-12-09
Payer: MEDICARE

## 2024-12-09 ENCOUNTER — OFFICE VISIT (OUTPATIENT)
Dept: INTERNAL MEDICINE | Facility: CLINIC | Age: 69
End: 2024-12-09
Payer: MEDICARE

## 2024-12-09 VITALS
HEIGHT: 74 IN | WEIGHT: 210.31 LBS | HEART RATE: 70 BPM | OXYGEN SATURATION: 99 % | SYSTOLIC BLOOD PRESSURE: 124 MMHG | DIASTOLIC BLOOD PRESSURE: 80 MMHG | BODY MASS INDEX: 26.99 KG/M2

## 2024-12-09 DIAGNOSIS — L72.3 INFLAMED EPIDERMOID CYST OF SKIN: Primary | ICD-10-CM

## 2024-12-09 DIAGNOSIS — Z23 NEED FOR VACCINATION: Primary | ICD-10-CM

## 2024-12-09 DIAGNOSIS — Z23 NEED FOR VACCINATION: ICD-10-CM

## 2024-12-09 DIAGNOSIS — I10 HYPERTENSION, ESSENTIAL: Primary | ICD-10-CM

## 2024-12-09 DIAGNOSIS — E11.9 TYPE 2 DIABETES MELLITUS WITHOUT COMPLICATION, WITHOUT LONG-TERM CURRENT USE OF INSULIN: ICD-10-CM

## 2024-12-09 DIAGNOSIS — L72.0 EPIDERMAL INCLUSION CYST: ICD-10-CM

## 2024-12-09 PROCEDURE — 3008F BODY MASS INDEX DOCD: CPT | Mod: CPTII,S$GLB,, | Performed by: INTERNAL MEDICINE

## 2024-12-09 PROCEDURE — 3066F NEPHROPATHY DOC TX: CPT | Mod: CPTII,S$GLB,, | Performed by: STUDENT IN AN ORGANIZED HEALTH CARE EDUCATION/TRAINING PROGRAM

## 2024-12-09 PROCEDURE — 1160F RVW MEDS BY RX/DR IN RCRD: CPT | Mod: CPTII,S$GLB,, | Performed by: INTERNAL MEDICINE

## 2024-12-09 PROCEDURE — 1126F AMNT PAIN NOTED NONE PRSNT: CPT | Mod: CPTII,S$GLB,, | Performed by: INTERNAL MEDICINE

## 2024-12-09 PROCEDURE — 3288F FALL RISK ASSESSMENT DOCD: CPT | Mod: CPTII,S$GLB,, | Performed by: INTERNAL MEDICINE

## 2024-12-09 PROCEDURE — 3074F SYST BP LT 130 MM HG: CPT | Mod: CPTII,S$GLB,, | Performed by: INTERNAL MEDICINE

## 2024-12-09 PROCEDURE — 3044F HG A1C LEVEL LT 7.0%: CPT | Mod: CPTII,S$GLB,, | Performed by: INTERNAL MEDICINE

## 2024-12-09 PROCEDURE — G2211 COMPLEX E/M VISIT ADD ON: HCPCS | Mod: S$GLB,,, | Performed by: INTERNAL MEDICINE

## 2024-12-09 PROCEDURE — 3066F NEPHROPATHY DOC TX: CPT | Mod: CPTII,S$GLB,, | Performed by: INTERNAL MEDICINE

## 2024-12-09 PROCEDURE — 3044F HG A1C LEVEL LT 7.0%: CPT | Mod: CPTII,S$GLB,, | Performed by: STUDENT IN AN ORGANIZED HEALTH CARE EDUCATION/TRAINING PROGRAM

## 2024-12-09 PROCEDURE — 4010F ACE/ARB THERAPY RXD/TAKEN: CPT | Mod: CPTII,S$GLB,, | Performed by: INTERNAL MEDICINE

## 2024-12-09 PROCEDURE — 99999 PR PBB SHADOW E&M-EST. PATIENT-LVL III: CPT | Mod: PBBFAC,,, | Performed by: INTERNAL MEDICINE

## 2024-12-09 PROCEDURE — 1101F PT FALLS ASSESS-DOCD LE1/YR: CPT | Mod: CPTII,S$GLB,, | Performed by: STUDENT IN AN ORGANIZED HEALTH CARE EDUCATION/TRAINING PROGRAM

## 2024-12-09 PROCEDURE — 3061F NEG MICROALBUMINURIA REV: CPT | Mod: CPTII,S$GLB,, | Performed by: INTERNAL MEDICINE

## 2024-12-09 PROCEDURE — 90480 ADMN SARSCOV2 VAC 1/ONLY CMP: CPT | Mod: S$GLB,,, | Performed by: INTERNAL MEDICINE

## 2024-12-09 PROCEDURE — 1159F MED LIST DOCD IN RCRD: CPT | Mod: CPTII,S$GLB,, | Performed by: INTERNAL MEDICINE

## 2024-12-09 PROCEDURE — 99203 OFFICE O/P NEW LOW 30 MIN: CPT | Mod: S$GLB,,, | Performed by: STUDENT IN AN ORGANIZED HEALTH CARE EDUCATION/TRAINING PROGRAM

## 2024-12-09 PROCEDURE — 99999 PR PBB SHADOW E&M-EST. PATIENT-LVL III: CPT | Mod: PBBFAC,,, | Performed by: STUDENT IN AN ORGANIZED HEALTH CARE EDUCATION/TRAINING PROGRAM

## 2024-12-09 PROCEDURE — 1159F MED LIST DOCD IN RCRD: CPT | Mod: CPTII,S$GLB,, | Performed by: STUDENT IN AN ORGANIZED HEALTH CARE EDUCATION/TRAINING PROGRAM

## 2024-12-09 PROCEDURE — 90653 IIV ADJUVANT VACCINE IM: CPT | Mod: S$GLB,,, | Performed by: INTERNAL MEDICINE

## 2024-12-09 PROCEDURE — 2023F DILAT RTA XM W/O RTNOPTHY: CPT | Mod: CPTII,S$GLB,, | Performed by: INTERNAL MEDICINE

## 2024-12-09 PROCEDURE — 3061F NEG MICROALBUMINURIA REV: CPT | Mod: CPTII,S$GLB,, | Performed by: STUDENT IN AN ORGANIZED HEALTH CARE EDUCATION/TRAINING PROGRAM

## 2024-12-09 PROCEDURE — 1101F PT FALLS ASSESS-DOCD LE1/YR: CPT | Mod: CPTII,S$GLB,, | Performed by: INTERNAL MEDICINE

## 2024-12-09 PROCEDURE — 3288F FALL RISK ASSESSMENT DOCD: CPT | Mod: CPTII,S$GLB,, | Performed by: STUDENT IN AN ORGANIZED HEALTH CARE EDUCATION/TRAINING PROGRAM

## 2024-12-09 PROCEDURE — 4010F ACE/ARB THERAPY RXD/TAKEN: CPT | Mod: CPTII,S$GLB,, | Performed by: STUDENT IN AN ORGANIZED HEALTH CARE EDUCATION/TRAINING PROGRAM

## 2024-12-09 PROCEDURE — 3079F DIAST BP 80-89 MM HG: CPT | Mod: CPTII,S$GLB,, | Performed by: INTERNAL MEDICINE

## 2024-12-09 PROCEDURE — 91320 SARSCV2 VAC 30MCG TRS-SUC IM: CPT | Mod: S$GLB,,, | Performed by: INTERNAL MEDICINE

## 2024-12-09 PROCEDURE — 99214 OFFICE O/P EST MOD 30 MIN: CPT | Mod: S$GLB,,, | Performed by: INTERNAL MEDICINE

## 2024-12-09 PROCEDURE — G0008 ADMIN INFLUENZA VIRUS VAC: HCPCS | Mod: S$GLB,,, | Performed by: INTERNAL MEDICINE

## 2024-12-09 PROCEDURE — 1160F RVW MEDS BY RX/DR IN RCRD: CPT | Mod: CPTII,S$GLB,, | Performed by: STUDENT IN AN ORGANIZED HEALTH CARE EDUCATION/TRAINING PROGRAM

## 2024-12-09 PROCEDURE — 1125F AMNT PAIN NOTED PAIN PRSNT: CPT | Mod: CPTII,S$GLB,, | Performed by: STUDENT IN AN ORGANIZED HEALTH CARE EDUCATION/TRAINING PROGRAM

## 2024-12-09 RX ORDER — MUPIROCIN 20 MG/G
OINTMENT TOPICAL 3 TIMES DAILY
Qty: 22 G | Refills: 2 | Status: SHIPPED | OUTPATIENT
Start: 2024-12-09

## 2024-12-09 NOTE — PROGRESS NOTES
"Subjective:       Patient ID: Francis Weinberg is a 69 y.o. male.    Chief Complaint: Follow-up    HPI  Francis Weinberg is a 69 y.o. male here for a yearly preventative healthcare visit.     HTN  Amlodipine 5mg (decreased last visit)  Valsartan 160mg  Home 120S-130S/70s-80s  Did have recent vacation with dietary indiscretion    HLD  Atorva 40mg    DM2 - diet controlled  Lab Results   Component Value Date    HGBA1C 6.3 (H) 08/08/2024    HGBA1C 6.4 (H) 02/08/2024    HGBA1C 6.2 (H) 06/28/2023     ED  Cialis 20mg prn  Review of Systems   Constitutional:  Negative for fever.   Respiratory:  Negative for shortness of breath.    Cardiovascular:  Negative for chest pain.   Musculoskeletal: Negative.    Skin: Negative.        Objective:   /80 (BP Location: Left arm, Patient Position: Sitting)   Pulse 70   Ht 6' 2" (1.88 m)   Wt 95.4 kg (210 lb 5.1 oz)   SpO2 99%   BMI 27.00 kg/m²      Physical Exam  Constitutional:       General: He is not in acute distress.     Appearance: He is well-developed. He is not diaphoretic.   HENT:      Head: Normocephalic and atraumatic.   Cardiovascular:      Rate and Rhythm: Normal rate and regular rhythm.      Heart sounds: No murmur heard.  Pulmonary:      Effort: Pulmonary effort is normal. No respiratory distress.      Breath sounds: No wheezing or rales.   Skin:     General: Skin is warm and dry.   Neurological:      Mental Status: He is alert and oriented to person, place, and time.   Psychiatric:         Behavior: Behavior normal.         Assessment:       1. Hypertension, essential    2. Need for vaccination    3. Type 2 diabetes mellitus without complication, without long-term current use of insulin        Plan:       Hypertension, essential  - stable and controlled  - continue current regimen    Need for vaccination  -     Influenza - Trivalent (Adjuvanted)    Type 2 diabetes mellitus without complication, without long-term current use of insulin  - stable and controlled  - continue " current regimen          You are up to date for your primary preventive health care, and there are no reminders at this time.     Flu, covid vaccines today  Eye exams -  Dr. Zarina Jaime outside of [facility]- [phone] - went in November  Lab 6 mo, visit after

## 2024-12-09 NOTE — LETTER
AUTHORIZATION FOR RELEASE OF   CONFIDENTIAL INFORMATION    [facility] ,    We are seeing Francis Weinberg, date of birth 1955, in the clinic at [facility]. Latoya Olson MD is the patient's PCP. Francis Weinberg has an outstanding lab/procedure at the time we reviewed his chart. In order to help keep his health information updated, he has authorized us to request the following medical record(s):        (  )  MAMMOGRAM                                      (  )  COLONOSCOPY      (  )  PAP SMEAR                                          (  )  OUTSIDE LAB RESULTS     (  )  DEXA SCAN                                          ( xx )  EYE EXAM            (  )  FOOT EXAM                                          (  )  ENTIRE RECORD     (  )  OUTSIDE IMMUNIZATIONS                 (  )  _______________         Please fax records to [facility], Braaten, Jennifer N., MD, [phone]     If you have any questions, please contact Alma Rosa at [phone].           Patient Name: Francis Weinberg  : 1955  Patient Phone #: [phone]

## 2024-12-09 NOTE — PROGRESS NOTES
Subjective:      Patient ID:  Francis Weinberg is a 69 y.o. male who presents for   Chief Complaint   Patient presents with    Cyst     Pt here for cyst on lower back     Pt denies h/o skin cancer     Pt has had cyst on lower back for 1 year but has become inflamed in last week. Pt reports having cyst lanced before but came back.     Cyst      Review of Systems    Objective:   Physical Exam   Constitutional: He appears well-developed and well-nourished.   Neurological: He is alert and oriented to person, place, and time.   Psychiatric: He has a normal mood and affect.   Skin:   Areas Examined (abnormalities noted in diagram):   Back Inspection Performed            Diagram Legend     Erythematous scaling macule/papule c/w actinic keratosis       Vascular papule c/w angioma      Pigmented verrucoid papule/plaque c/w seborrheic keratosis      Yellow umbilicated papule c/w sebaceous hyperplasia      Irregularly shaped tan macule c/w lentigo     1-2 mm smooth white papules consistent with Milia      Movable subcutaneous cyst with punctum c/w epidermal inclusion cyst      Subcutaneous movable cyst c/w pilar cyst      Firm pink to brown papule c/w dermatofibroma      Pedunculated fleshy papule(s) c/w skin tag(s)      Evenly pigmented macule c/w junctional nevus     Mildly variegated pigmented, slightly irregular-bordered macule c/w mildly atypical nevus      Flesh colored to evenly pigmented papule c/w intradermal nevus       Pink pearly papule/plaque c/w basal cell carcinoma      Erythematous hyperkeratotic cursted plaque c/w SCC      Surgical scar with no sign of skin cancer recurrence      Open and closed comedones      Inflammatory papules and pustules      Verrucoid papule consistent consistent with wart     Erythematous eczematous patches and plaques     Dystrophic onycholytic nail with subungual debris c/w onychomycosis     Umbilicated papule    Erythematous-base heme-crusted tan verrucoid plaque consistent with  inflamed seborrheic keratosis     Erythematous Silvery Scaling Plaque c/w Psoriasis     See annotation      Assessment / Plan:        Inflamed epidermoid cyst of skin  -     Ambulatory referral/consult to Dermatology  -     mupirocin (BACTROBAN) 2 % ointment; Apply topically 3 (three) times daily. Apply to cyst on back until healed  Dispense: 22 g; Refill: 2    Epidermal inclusion cyst    Reassurance given to patient on benign nature.  Discussed treatment options - excision vs observation. If lesion is not treated, it may grow and become intermittently inflamed. Discussed RBSE of surgery including scar, infection, bleeding and recurrence. Patient voiced understanding and would like to defer treatment for now.    Offered I&D and ILK for inflamed cyst. It is naturally draining and not that bothersome so no treatment needed at this time  Can use mupirocin until healed           Follow up if symptoms worsen or fail to improve.

## 2024-12-09 NOTE — PROGRESS NOTES
Chart reviewed and updated. Reconciled immunizations.requested eye exam record from [facility].  Alma Rosa Yee LPN   Clinical Care Coordinator  [facility]

## 2024-12-10 ENCOUNTER — PATIENT OUTREACH (OUTPATIENT)
Dept: ADMINISTRATIVE | Facility: HOSPITAL | Age: 69
End: 2024-12-10
Payer: MEDICARE

## 2024-12-10 NOTE — PROGRESS NOTES
Chart reviewed and updated. Reconciled immunizations. Uploaded eye exam record.  Alma Rosa Yee LPN   Clinical Care Coordinator  [facility]

## 2025-02-28 DIAGNOSIS — I10 ESSENTIAL HYPERTENSION: ICD-10-CM

## 2025-02-28 RX ORDER — VALSARTAN 160 MG/1
160 TABLET ORAL DAILY
Qty: 90 TABLET | Refills: 1 | Status: SHIPPED | OUTPATIENT
Start: 2025-02-28

## 2025-02-28 NOTE — TELEPHONE ENCOUNTER
No care due was identified.  Health [facility] Embedded Care Due Messages. Reference number: [id number].   2/28/2025 12:08:40 AM CST

## 2025-02-28 NOTE — TELEPHONE ENCOUNTER
Refill Decision Note   Francis Weinberg  is requesting a refill authorization.    Brief Assessment and Rationale for Refill:   Approve       Medication Therapy Plan:         Comments:     Note composed:7:25 AM 02/28/2025

## 2025-06-02 ENCOUNTER — LAB VISIT (OUTPATIENT)
Dept: LAB | Facility: HOSPITAL | Age: 70
End: 2025-06-02
Attending: INTERNAL MEDICINE
Payer: MEDICARE

## 2025-06-02 DIAGNOSIS — E11.9 TYPE 2 DIABETES MELLITUS WITHOUT COMPLICATION, WITHOUT LONG-TERM CURRENT USE OF INSULIN: ICD-10-CM

## 2025-06-02 LAB
ALBUMIN SERPL BCP-MCNC: 3.9 G/DL (ref 3.5–5.2)
ALP SERPL-CCNC: 125 UNIT/L (ref 40–150)
ALT SERPL W/O P-5'-P-CCNC: 41 UNIT/L (ref 10–44)
ANION GAP (OHS): 7 MMOL/L (ref 8–16)
AST SERPL-CCNC: 25 UNIT/L (ref 11–45)
BILIRUB SERPL-MCNC: 0.4 MG/DL (ref 0.1–1)
BUN SERPL-MCNC: 17 MG/DL (ref 8–23)
CALCIUM SERPL-MCNC: 9.2 MG/DL (ref 8.7–10.5)
CHLORIDE SERPL-SCNC: 105 MMOL/L (ref 95–110)
CO2 SERPL-SCNC: 26 MMOL/L (ref 23–29)
CREAT SERPL-MCNC: 0.9 MG/DL (ref 0.5–1.4)
EAG (OHS): 140 MG/DL (ref 68–131)
GFR SERPLBLD CREATININE-BSD FMLA CKD-EPI: >60 ML/MIN/1.73/M2
GLUCOSE SERPL-MCNC: 99 MG/DL (ref 70–110)
HBA1C MFR BLD: 6.5 % (ref 4–5.6)
POTASSIUM SERPL-SCNC: 3.7 MMOL/L (ref 3.5–5.1)
PROT SERPL-MCNC: 7.6 GM/DL (ref 6–8.4)
SODIUM SERPL-SCNC: 138 MMOL/L (ref 136–145)

## 2025-06-02 PROCEDURE — 83036 HEMOGLOBIN GLYCOSYLATED A1C: CPT

## 2025-06-02 PROCEDURE — 84075 ASSAY ALKALINE PHOSPHATASE: CPT

## 2025-06-02 PROCEDURE — 36415 COLL VENOUS BLD VENIPUNCTURE: CPT

## 2025-06-06 ENCOUNTER — TELEPHONE (OUTPATIENT)
Dept: INTERNAL MEDICINE | Facility: CLINIC | Age: 70
End: 2025-06-06
Payer: MEDICARE

## 2025-06-09 ENCOUNTER — OFFICE VISIT (OUTPATIENT)
Dept: INTERNAL MEDICINE | Facility: CLINIC | Age: 70
End: 2025-06-09
Payer: MEDICARE

## 2025-06-09 VITALS
WEIGHT: 216.5 LBS | DIASTOLIC BLOOD PRESSURE: 80 MMHG | OXYGEN SATURATION: 96 % | BODY MASS INDEX: 27.78 KG/M2 | HEIGHT: 74 IN | HEART RATE: 89 BPM | SYSTOLIC BLOOD PRESSURE: 122 MMHG

## 2025-06-09 DIAGNOSIS — N52.01 ERECTILE DYSFUNCTION DUE TO ARTERIAL INSUFFICIENCY: ICD-10-CM

## 2025-06-09 DIAGNOSIS — D56.9 THALASSEMIA, UNSPECIFIED TYPE: ICD-10-CM

## 2025-06-09 DIAGNOSIS — N40.1 BPH WITH URINARY OBSTRUCTION: ICD-10-CM

## 2025-06-09 DIAGNOSIS — I10 ESSENTIAL HYPERTENSION: Primary | ICD-10-CM

## 2025-06-09 DIAGNOSIS — E11.9 TYPE 2 DIABETES MELLITUS WITHOUT COMPLICATION, WITHOUT LONG-TERM CURRENT USE OF INSULIN: ICD-10-CM

## 2025-06-09 DIAGNOSIS — Z12.5 SPECIAL SCREENING FOR MALIGNANT NEOPLASM OF PROSTATE: ICD-10-CM

## 2025-06-09 DIAGNOSIS — Z23 NEED FOR TDAP VACCINATION: ICD-10-CM

## 2025-06-09 DIAGNOSIS — N13.8 BPH WITH URINARY OBSTRUCTION: ICD-10-CM

## 2025-06-09 PROCEDURE — G2211 COMPLEX E/M VISIT ADD ON: HCPCS | Mod: S$GLB,,, | Performed by: INTERNAL MEDICINE

## 2025-06-09 PROCEDURE — 1159F MED LIST DOCD IN RCRD: CPT | Mod: CPTII,S$GLB,, | Performed by: INTERNAL MEDICINE

## 2025-06-09 PROCEDURE — 3044F HG A1C LEVEL LT 7.0%: CPT | Mod: CPTII,S$GLB,, | Performed by: INTERNAL MEDICINE

## 2025-06-09 PROCEDURE — 3074F SYST BP LT 130 MM HG: CPT | Mod: CPTII,S$GLB,, | Performed by: INTERNAL MEDICINE

## 2025-06-09 PROCEDURE — 3066F NEPHROPATHY DOC TX: CPT | Mod: CPTII,S$GLB,, | Performed by: INTERNAL MEDICINE

## 2025-06-09 PROCEDURE — 99214 OFFICE O/P EST MOD 30 MIN: CPT | Mod: S$GLB,,, | Performed by: INTERNAL MEDICINE

## 2025-06-09 PROCEDURE — 1160F RVW MEDS BY RX/DR IN RCRD: CPT | Mod: CPTII,S$GLB,, | Performed by: INTERNAL MEDICINE

## 2025-06-09 PROCEDURE — 4010F ACE/ARB THERAPY RXD/TAKEN: CPT | Mod: CPTII,S$GLB,, | Performed by: INTERNAL MEDICINE

## 2025-06-09 PROCEDURE — 3061F NEG MICROALBUMINURIA REV: CPT | Mod: CPTII,S$GLB,, | Performed by: INTERNAL MEDICINE

## 2025-06-09 PROCEDURE — 3079F DIAST BP 80-89 MM HG: CPT | Mod: CPTII,S$GLB,, | Performed by: INTERNAL MEDICINE

## 2025-06-09 PROCEDURE — 1101F PT FALLS ASSESS-DOCD LE1/YR: CPT | Mod: CPTII,S$GLB,, | Performed by: INTERNAL MEDICINE

## 2025-06-09 PROCEDURE — 3288F FALL RISK ASSESSMENT DOCD: CPT | Mod: CPTII,S$GLB,, | Performed by: INTERNAL MEDICINE

## 2025-06-09 PROCEDURE — 1126F AMNT PAIN NOTED NONE PRSNT: CPT | Mod: CPTII,S$GLB,, | Performed by: INTERNAL MEDICINE

## 2025-06-09 PROCEDURE — 99999 PR PBB SHADOW E&M-EST. PATIENT-LVL III: CPT | Mod: PBBFAC,,, | Performed by: INTERNAL MEDICINE

## 2025-06-09 PROCEDURE — 3008F BODY MASS INDEX DOCD: CPT | Mod: CPTII,S$GLB,, | Performed by: INTERNAL MEDICINE

## 2025-06-09 RX ORDER — VALSARTAN 160 MG/1
160 TABLET ORAL DAILY
Qty: 90 TABLET | Refills: 1 | Status: SHIPPED | OUTPATIENT
Start: 2025-06-09

## 2025-06-09 RX ORDER — TADALAFIL 20 MG/1
20 TABLET ORAL DAILY PRN
Qty: 6 TABLET | Refills: 11 | Status: SHIPPED | OUTPATIENT
Start: 2025-06-09 | End: 2026-06-09

## 2025-06-09 RX ORDER — AMLODIPINE BESYLATE 5 MG/1
5 TABLET ORAL DAILY
Qty: 90 TABLET | Refills: 3 | Status: SHIPPED | OUTPATIENT
Start: 2025-06-09

## 2025-06-09 RX ORDER — ATORVASTATIN CALCIUM 40 MG/1
40 TABLET, FILM COATED ORAL NIGHTLY
Qty: 90 TABLET | Refills: 3 | Status: SHIPPED | OUTPATIENT
Start: 2025-06-09

## 2025-06-09 NOTE — PROGRESS NOTES
"+Subjective:       Patient ID: Francis Weinberg is a 69 y.o. male.    Chief Complaint: 6 month follow up    HPI  69 y.o. male here for follow up of    HTN - previously well controlled on current regimen  Amlodipine 5mg (decreased last visit)  Valsartan 160mg  About twice a week his BP is under 120 systolics and he holds the medicine.      HLD  Atorva 40mg     ED   Cialis 20mg prn    DM2 - diet controlled  Lab Results   Component Value Date    HGBA1C 6.5 (H) 06/02/2025    HGBA1C 6.3 (H) 08/08/2024    HGBA1C 6.4 (H) 02/08/2024     Mild chronic anemia dating back over 20 years with previously normal ferritin.   Hgb 12-13; most recently 12.6.     Review of Systems   Constitutional:  Negative for fever.   Respiratory:  Negative for shortness of breath.    Cardiovascular:  Negative for chest pain.   Musculoskeletal: Negative.    Skin: Negative.        Objective:   /80   Pulse 89   Ht 6' 2" (1.88 m)   Wt 98.2 kg (216 lb 7.9 oz)   SpO2 96%   BMI 27.80 kg/m²      Physical Exam  Constitutional:       General: He is not in acute distress.     Appearance: He is well-developed. He is not diaphoretic.   HENT:      Head: Normocephalic and atraumatic.   Cardiovascular:      Rate and Rhythm: Normal rate and regular rhythm.      Heart sounds: No murmur heard.  Pulmonary:      Effort: Pulmonary effort is normal. No respiratory distress.      Breath sounds: No wheezing or rales.   Skin:     General: Skin is warm and dry.   Neurological:      Mental Status: He is alert and oriented to person, place, and time.   Psychiatric:         Behavior: Behavior normal.         Assessment:       1. Essential hypertension    2. Type 2 diabetes mellitus without complication, without long-term current use of insulin    3. Thalassemia, unspecified type    4. BPH with urinary obstruction    5. Erectile dysfunction due to arterial insufficiency    6. Special screening for malignant neoplasm of prostate    7. Need for Tdap vaccination        Plan: "       Essential hypertension  -     Comprehensive Metabolic Panel; Future; Expected date: 12/10/2025  -     Hemoglobin A1C; Future; Expected date: 12/06/2025  -     Lipid Panel; Future; Expected date: 12/10/2025  -     TSH; Future; Expected date: 12/10/2025  -     CBC Auto Differential; Future; Expected date: 12/10/2025  -     Cancel: Ferritin; Future; Expected date: 06/09/2025  -     amLODIPine (NORVASC) 5 MG tablet; Take 1 tablet (5 mg total) by mouth once daily.  Dispense: 90 tablet; Refill: 3  -     atorvastatin (LIPITOR) 40 MG tablet; Take 1 tablet (40 mg total) by mouth every evening.  Dispense: 90 tablet; Refill: 3  -     valsartan (DIOVAN) 160 MG tablet; Take 1 tablet (160 mg total) by mouth once daily.  Dispense: 90 tablet; Refill: 1    Type 2 diabetes mellitus without complication, without long-term current use of insulin  -     Comprehensive Metabolic Panel; Future; Expected date: 12/10/2025  -     Hemoglobin A1C; Future; Expected date: 12/06/2025  -     Lipid Panel; Future; Expected date: 12/10/2025  -     TSH; Future; Expected date: 12/10/2025  -     CBC Auto Differential; Future; Expected date: 12/10/2025  -     Cancel: Ferritin; Future; Expected date: 06/09/2025  -     atorvastatin (LIPITOR) 40 MG tablet; Take 1 tablet (40 mg total) by mouth every evening.  Dispense: 90 tablet; Refill: 3    Thalassemia, unspecified type  -     Comprehensive Metabolic Panel; Future; Expected date: 12/10/2025  -     Hemoglobin A1C; Future; Expected date: 12/06/2025  -     Lipid Panel; Future; Expected date: 12/10/2025  -     TSH; Future; Expected date: 12/10/2025  -     CBC Auto Differential; Future; Expected date: 12/10/2025  -     Cancel: Ferritin; Future; Expected date: 06/09/2025  -     Ferritin; Future; Expected date: 12/09/2025    BPH with urinary obstruction  -     Comprehensive Metabolic Panel; Future; Expected date: 12/10/2025  -     Hemoglobin A1C; Future; Expected date: 12/06/2025  -     Lipid Panel; Future;  Expected date: 12/10/2025  -     TSH; Future; Expected date: 12/10/2025  -     CBC Auto Differential; Future; Expected date: 12/10/2025  -     Cancel: Ferritin; Future; Expected date: 06/09/2025    Erectile dysfunction due to arterial insufficiency  -     tadalafiL (CIALIS) 20 MG Tab; Take 1 tablet (20 mg total) by mouth daily as needed (erectile dysfunction).  Dispense: 6 tablet; Refill: 11    Special screening for malignant neoplasm of prostate  -     PSA, Screening; Future; Expected date: 12/10/2025    Need for Tdap vaccination  Tdap at pharmacy today          You are up to date for your primary preventive health care, and there are no reminders at this time.     Do not hold meds unless < 100 systolic. If holding more than 2x/mo alert clinic so regimen can be adjusted.   Drink plenty of water  BP   6 mo labs  tdap

## (undated) DEVICE — NDL FLTR 5MCRN BLNT TIP 18GX1

## (undated) DEVICE — SEE MEDLINE ITEM 157131

## (undated) DEVICE — SHEILD PLASTIC EYE

## (undated) DEVICE — SHIELD COLLAGEN 12HR CORNEAL

## (undated) DEVICE — SOL WATER STRL IRR 1000ML

## (undated) DEVICE — DRAPE STERI INCISE MED 130X130

## (undated) DEVICE — DRAPE OPHTHALMIC 48X62 FEN

## (undated) DEVICE — KIT GREY EYE

## (undated) DEVICE — GOWN SURGICAL X-LARGE

## (undated) DEVICE — GARTER EYE ADULT

## (undated) DEVICE — SOL BETADINE 5%